# Patient Record
Sex: FEMALE | Race: BLACK OR AFRICAN AMERICAN | NOT HISPANIC OR LATINO
[De-identification: names, ages, dates, MRNs, and addresses within clinical notes are randomized per-mention and may not be internally consistent; named-entity substitution may affect disease eponyms.]

---

## 2018-04-27 ENCOUNTER — APPOINTMENT (OUTPATIENT)
Dept: OBGYN | Facility: CLINIC | Age: 48
End: 2018-04-27

## 2018-06-15 ENCOUNTER — APPOINTMENT (OUTPATIENT)
Dept: OBGYN | Facility: CLINIC | Age: 48
End: 2018-06-15

## 2018-06-28 ENCOUNTER — APPOINTMENT (OUTPATIENT)
Dept: OBGYN | Facility: CLINIC | Age: 48
End: 2018-06-28
Payer: COMMERCIAL

## 2018-06-28 DIAGNOSIS — E11.29 TYPE 2 DIABETES MELLITUS WITH OTHER DIABETIC KIDNEY COMPLICATION: ICD-10-CM

## 2018-06-28 DIAGNOSIS — Z80.9 FAMILY HISTORY OF MALIGNANT NEOPLASM, UNSPECIFIED: ICD-10-CM

## 2018-06-28 DIAGNOSIS — Z87.19 PERSONAL HISTORY OF OTHER DISEASES OF THE DIGESTIVE SYSTEM: ICD-10-CM

## 2018-06-28 DIAGNOSIS — Z78.9 OTHER SPECIFIED HEALTH STATUS: ICD-10-CM

## 2018-06-28 DIAGNOSIS — I10 ESSENTIAL (PRIMARY) HYPERTENSION: ICD-10-CM

## 2018-06-28 DIAGNOSIS — Z82.5 FAMILY HISTORY OF ASTHMA AND OTHER CHRONIC LOWER RESPIRATORY DISEASES: ICD-10-CM

## 2018-06-28 DIAGNOSIS — Z87.09 PERSONAL HISTORY OF OTHER DISEASES OF THE RESPIRATORY SYSTEM: ICD-10-CM

## 2018-06-28 DIAGNOSIS — Z83.3 FAMILY HISTORY OF DIABETES MELLITUS: ICD-10-CM

## 2018-06-28 DIAGNOSIS — Z86.39 PERSONAL HISTORY OF OTHER ENDOCRINE, NUTRITIONAL AND METABOLIC DISEASE: ICD-10-CM

## 2018-06-28 PROCEDURE — 99214 OFFICE O/P EST MOD 30 MIN: CPT

## 2018-07-02 LAB
C TRACH RRNA SPEC QL NAA+PROBE: NOT DETECTED
HPV HIGH+LOW RISK DNA PNL CVX: NOT DETECTED
N GONORRHOEA RRNA SPEC QL NAA+PROBE: NOT DETECTED
SOURCE AMPLIFICATION: NORMAL

## 2018-07-05 LAB — CYTOLOGY CVX/VAG DOC THIN PREP: NORMAL

## 2018-07-12 ENCOUNTER — APPOINTMENT (OUTPATIENT)
Dept: OBGYN | Facility: CLINIC | Age: 48
End: 2018-07-12
Payer: COMMERCIAL

## 2018-07-12 PROCEDURE — 76830 TRANSVAGINAL US NON-OB: CPT

## 2018-07-12 PROCEDURE — 76857 US EXAM PELVIC LIMITED: CPT

## 2018-07-23 ENCOUNTER — APPOINTMENT (OUTPATIENT)
Dept: OBGYN | Facility: CLINIC | Age: 48
End: 2018-07-23
Payer: COMMERCIAL

## 2018-07-23 VITALS
BODY MASS INDEX: 51.91 KG/M2 | SYSTOLIC BLOOD PRESSURE: 194 MMHG | WEIGHT: 293 LBS | HEART RATE: 89 BPM | DIASTOLIC BLOOD PRESSURE: 95 MMHG | HEIGHT: 63 IN

## 2018-07-23 PROCEDURE — 99213 OFFICE O/P EST LOW 20 MIN: CPT

## 2018-08-22 ENCOUNTER — APPOINTMENT (OUTPATIENT)
Dept: OBGYN | Facility: CLINIC | Age: 48
End: 2018-08-22
Payer: COMMERCIAL

## 2018-08-22 VITALS
DIASTOLIC BLOOD PRESSURE: 85 MMHG | BODY MASS INDEX: 52.5 KG/M2 | SYSTOLIC BLOOD PRESSURE: 130 MMHG | WEIGHT: 293 LBS | HEART RATE: 77 BPM

## 2018-08-22 PROCEDURE — 99213 OFFICE O/P EST LOW 20 MIN: CPT

## 2018-10-05 ENCOUNTER — OUTPATIENT (OUTPATIENT)
Dept: OUTPATIENT SERVICES | Facility: HOSPITAL | Age: 48
LOS: 1 days | End: 2018-10-05
Payer: COMMERCIAL

## 2018-10-05 VITALS
SYSTOLIC BLOOD PRESSURE: 136 MMHG | RESPIRATION RATE: 20 BRPM | HEIGHT: 64.5 IN | HEART RATE: 74 BPM | TEMPERATURE: 98 F | DIASTOLIC BLOOD PRESSURE: 87 MMHG | WEIGHT: 293 LBS

## 2018-10-05 DIAGNOSIS — Z98.890 OTHER SPECIFIED POSTPROCEDURAL STATES: Chronic | ICD-10-CM

## 2018-10-05 DIAGNOSIS — Z01.818 ENCOUNTER FOR OTHER PREPROCEDURAL EXAMINATION: ICD-10-CM

## 2018-10-05 DIAGNOSIS — D25.9 LEIOMYOMA OF UTERUS, UNSPECIFIED: ICD-10-CM

## 2018-10-05 DIAGNOSIS — I10 ESSENTIAL (PRIMARY) HYPERTENSION: ICD-10-CM

## 2018-10-05 DIAGNOSIS — E11.9 TYPE 2 DIABETES MELLITUS WITHOUT COMPLICATIONS: ICD-10-CM

## 2018-10-05 DIAGNOSIS — Z29.9 ENCOUNTER FOR PROPHYLACTIC MEASURES, UNSPECIFIED: ICD-10-CM

## 2018-10-05 LAB
ANION GAP SERPL CALC-SCNC: 13 MMOL/L — SIGNIFICANT CHANGE UP (ref 5–17)
BUN SERPL-MCNC: 10 MG/DL — SIGNIFICANT CHANGE UP (ref 8–20)
CALCIUM SERPL-MCNC: 9.1 MG/DL — SIGNIFICANT CHANGE UP (ref 8.6–10.2)
CHLORIDE SERPL-SCNC: 100 MMOL/L — SIGNIFICANT CHANGE UP (ref 98–107)
CO2 SERPL-SCNC: 27 MMOL/L — SIGNIFICANT CHANGE UP (ref 22–29)
CREAT SERPL-MCNC: 0.7 MG/DL — SIGNIFICANT CHANGE UP (ref 0.5–1.3)
GLUCOSE SERPL-MCNC: 103 MG/DL — SIGNIFICANT CHANGE UP (ref 70–115)
HBA1C BLD-MCNC: 5.9 % — HIGH (ref 4–5.6)
HCT VFR BLD CALC: 39.5 % — SIGNIFICANT CHANGE UP (ref 37–47)
HGB BLD-MCNC: 12.2 G/DL — SIGNIFICANT CHANGE UP (ref 12–16)
MCHC RBC-ENTMCNC: 27.2 PG — SIGNIFICANT CHANGE UP (ref 27–31)
MCHC RBC-ENTMCNC: 30.9 G/DL — LOW (ref 32–36)
MCV RBC AUTO: 88.2 FL — SIGNIFICANT CHANGE UP (ref 81–99)
PLATELET # BLD AUTO: 387 K/UL — SIGNIFICANT CHANGE UP (ref 150–400)
POTASSIUM SERPL-MCNC: 3.5 MMOL/L — SIGNIFICANT CHANGE UP (ref 3.5–5.3)
POTASSIUM SERPL-SCNC: 3.5 MMOL/L — SIGNIFICANT CHANGE UP (ref 3.5–5.3)
RBC # BLD: 4.48 M/UL — SIGNIFICANT CHANGE UP (ref 4.4–5.2)
RBC # FLD: 13.9 % — SIGNIFICANT CHANGE UP (ref 11–15.6)
SODIUM SERPL-SCNC: 140 MMOL/L — SIGNIFICANT CHANGE UP (ref 135–145)
WBC # BLD: 4.5 K/UL — LOW (ref 4.8–10.8)
WBC # FLD AUTO: 4.5 K/UL — LOW (ref 4.8–10.8)

## 2018-10-05 PROCEDURE — G0463: CPT

## 2018-10-05 PROCEDURE — 93005 ELECTROCARDIOGRAM TRACING: CPT

## 2018-10-05 PROCEDURE — 83036 HEMOGLOBIN GLYCOSYLATED A1C: CPT

## 2018-10-05 PROCEDURE — 80048 BASIC METABOLIC PNL TOTAL CA: CPT

## 2018-10-05 PROCEDURE — 36415 COLL VENOUS BLD VENIPUNCTURE: CPT

## 2018-10-05 PROCEDURE — 93010 ELECTROCARDIOGRAM REPORT: CPT

## 2018-10-05 PROCEDURE — 85027 COMPLETE CBC AUTOMATED: CPT

## 2018-10-05 NOTE — H&P PST ADULT - ASSESSMENT
CAPRINI SCORE [CLOT]    AGE RELATED RISK FACTORS                                                       MOBILITY RELATED FACTORS  [x ] Age 41-60 years                                            (1 Point)                  [ ] Bed rest                                                        (1 Point)  [] Age: 61-74 years                                           (2 Points)                 [ ] Plaster cast                                                   (2 Points)  [ ] Age= 75 years                                              (3 Points)                 [ ] Bed bound for more than 72 hours                 (2 Points)    DISEASE RELATED RISK FACTORS                                               GENDER SPECIFIC FACTORS  [ x] Edema in the lower extremities                       (1 Point)                  [ ] Pregnancy                                                     (1 Point)  [ ] Varicose veins                                               (1 Point)                  [ ] Post-partum < 6 weeks                                   (1 Point)             [ x] BMI > 25 Kg/m2                                            (1 Point)                  [ ] Hormonal therapy  or oral contraception          (1 Point)                 [ ] Sepsis (in the previous month)                        (1 Point)                  [ ] History of pregnancy complications                 (1 point)  [ ] Pneumonia or serious lung disease                                               [ ] Unexplained or recurrent                     (1 Point)           (in the previous month)                               (1 Point)  [ ] Abnormal pulmonary function test                     (1 Point)                 SURGERY RELATED RISK FACTORS  [ ] Acute myocardial infarction                              (1 Point)                 [ ]  Section                                             (1 Point)  [ ] Congestive heart failure (in the previous month)  (1 Point)               [ ] Minor surgery                                                  (1 Point)   [ ] Inflammatory bowel disease                             (1 Point)                 [ ] Arthroscopic surgery                                        (2 Points)  [ ] Central venous access                                      (2 Points)                [ x] General surgery lasting more than 45 minutes   (2 Points)       [ ] Stroke (in the previous month)                          (5 Points)               [ ] Elective arthroplasty                                         (5 Points)                                                                                                                                               HEMATOLOGY RELATED FACTORS                                                 TRAUMA RELATED RISK FACTORS  [ ] Prior episodes of VTE                                     (3 Points)                 [ ] Fracture of the hip, pelvis, or leg                       (5 Points)  [ ] Positive family history for VTE                         (3 Points)                 [ ] Acute spinal cord injury (in the previous month)  (5 Points)  [ ] Prothrombin 64967 A                                     (3 Points)                 [ ] Paralysis  (less than 1 month)                             (5 Points)  [ ] Factor V Leiden                                             (3 Points)                  [ ] Multiple Trauma within 1 month                        (5 Points)  [ ] Lupus anticoagulants                                     (3 Points)                                                           [ ] Anticardiolipin antibodies                               (3 Points)                                                       [ ] High homocysteine in the blood                      (3 Points)                                             [ ] Other congenital or acquired thrombophilia      (3 Points)                                                [ ] Heparin induced thrombocytopenia                  (3 Points)                                          Total Score [    5  ]

## 2018-10-05 NOTE — H&P PST ADULT - HISTORY OF PRESENT ILLNESS
This is a 48  y.o female who presents to PST today.  The pt reports she has a history of fibroids and has had abnormal menstrual cycles. She also has an enlarged uterus and is now scheduled to have a D & C in the near future.

## 2018-10-05 NOTE — ASU PATIENT PROFILE, ADULT - LEARNING ASSESSMENT (PATIENT) ADDITIONAL COMMENTS
Instructed pt on pre-op instructions, tips for safer surgery, pain management scale, pre-surgical infection prevention instructions, take Valsartan-HCTZ with a sip of water and Symbicort the morning of surgery, medical clearance - Dr Rodriges, verbalized understanding of all instructions given.

## 2018-10-23 PROBLEM — G47.30 SLEEP APNEA, UNSPECIFIED: Chronic | Status: ACTIVE | Noted: 2018-10-05

## 2018-10-24 ENCOUNTER — APPOINTMENT (OUTPATIENT)
Dept: OBGYN | Facility: CLINIC | Age: 48
End: 2018-10-24

## 2018-11-05 ENCOUNTER — OUTPATIENT (OUTPATIENT)
Dept: OUTPATIENT SERVICES | Facility: HOSPITAL | Age: 48
LOS: 1 days | End: 2018-11-05
Payer: COMMERCIAL

## 2018-11-05 VITALS
TEMPERATURE: 98 F | HEART RATE: 68 BPM | WEIGHT: 293 LBS | HEIGHT: 63.5 IN | SYSTOLIC BLOOD PRESSURE: 146 MMHG | RESPIRATION RATE: 16 BRPM | DIASTOLIC BLOOD PRESSURE: 88 MMHG

## 2018-11-05 DIAGNOSIS — Z29.9 ENCOUNTER FOR PROPHYLACTIC MEASURES, UNSPECIFIED: ICD-10-CM

## 2018-11-05 DIAGNOSIS — D25.9 LEIOMYOMA OF UTERUS, UNSPECIFIED: ICD-10-CM

## 2018-11-05 DIAGNOSIS — Z98.890 OTHER SPECIFIED POSTPROCEDURAL STATES: Chronic | ICD-10-CM

## 2018-11-05 DIAGNOSIS — I10 ESSENTIAL (PRIMARY) HYPERTENSION: ICD-10-CM

## 2018-11-05 DIAGNOSIS — Z01.818 ENCOUNTER FOR OTHER PREPROCEDURAL EXAMINATION: ICD-10-CM

## 2018-11-05 DIAGNOSIS — E11.9 TYPE 2 DIABETES MELLITUS WITHOUT COMPLICATIONS: ICD-10-CM

## 2018-11-05 LAB
ANION GAP SERPL CALC-SCNC: 13 MMOL/L — SIGNIFICANT CHANGE UP (ref 5–17)
BUN SERPL-MCNC: 12 MG/DL — SIGNIFICANT CHANGE UP (ref 8–20)
CALCIUM SERPL-MCNC: 9.3 MG/DL — SIGNIFICANT CHANGE UP (ref 8.6–10.2)
CHLORIDE SERPL-SCNC: 101 MMOL/L — SIGNIFICANT CHANGE UP (ref 98–107)
CO2 SERPL-SCNC: 27 MMOL/L — SIGNIFICANT CHANGE UP (ref 22–29)
CREAT SERPL-MCNC: 0.73 MG/DL — SIGNIFICANT CHANGE UP (ref 0.5–1.3)
GLUCOSE SERPL-MCNC: 90 MG/DL — SIGNIFICANT CHANGE UP (ref 70–115)
HBA1C BLD-MCNC: 6.2 % — HIGH (ref 4–5.6)
HCT VFR BLD CALC: 32.7 % — LOW (ref 37–47)
HGB BLD-MCNC: 10.4 G/DL — LOW (ref 12–16)
MCHC RBC-ENTMCNC: 26.7 PG — LOW (ref 27–31)
MCHC RBC-ENTMCNC: 31.8 G/DL — LOW (ref 32–36)
MCV RBC AUTO: 84.1 FL — SIGNIFICANT CHANGE UP (ref 81–99)
PLATELET # BLD AUTO: 351 K/UL — SIGNIFICANT CHANGE UP (ref 150–400)
POTASSIUM SERPL-MCNC: 3.4 MMOL/L — LOW (ref 3.5–5.3)
POTASSIUM SERPL-SCNC: 3.4 MMOL/L — LOW (ref 3.5–5.3)
RBC # BLD: 3.89 M/UL — LOW (ref 4.4–5.2)
RBC # FLD: 14.3 % — SIGNIFICANT CHANGE UP (ref 11–15.6)
SODIUM SERPL-SCNC: 141 MMOL/L — SIGNIFICANT CHANGE UP (ref 135–145)
WBC # BLD: 5.8 K/UL — SIGNIFICANT CHANGE UP (ref 4.8–10.8)
WBC # FLD AUTO: 5.8 K/UL — SIGNIFICANT CHANGE UP (ref 4.8–10.8)

## 2018-11-05 PROCEDURE — G0463: CPT

## 2018-11-05 PROCEDURE — 85027 COMPLETE CBC AUTOMATED: CPT

## 2018-11-05 PROCEDURE — 36415 COLL VENOUS BLD VENIPUNCTURE: CPT

## 2018-11-05 PROCEDURE — 83036 HEMOGLOBIN GLYCOSYLATED A1C: CPT

## 2018-11-05 PROCEDURE — 80048 BASIC METABOLIC PNL TOTAL CA: CPT

## 2018-11-05 NOTE — H&P PST ADULT - ASSESSMENT
49 yo female with menometrorrhagia, fibroid uterus.    CAPRINI SCORE [CLOT]    AGE RELATED RISK FACTORS                                                       MOBILITY RELATED FACTORS  [x ] Age 41-60 years                                            (1 Point)                  [ ] Bed rest                                                        (1 Point)  [ ] Age: 61-74 years                                           (2 Points)                 [ ] Plaster cast                                                   (2 Points)  [ ] Age= 75 years                                              (3 Points)                 [ ] Bed bound for more than 72 hours                 (2 Points)    DISEASE RELATED RISK FACTORS                                               GENDER SPECIFIC FACTORS  [ ] Edema in the lower extremities                       (1 Point)                  [ ] Pregnancy                                                     (1 Point)  [ ] Varicose veins                                               (1 Point)                  [ ] Post-partum < 6 weeks                                   (1 Point)             [ x] BMI > 25 Kg/m2                                            (1 Point)                  [ ] Hormonal therapy  or oral contraception          (1 Point)                 [ ] Sepsis (in the previous month)                        (1 Point)                  [ ] History of pregnancy complications                 (1 point)  [ ] Pneumonia or serious lung disease                                               [ ] Unexplained or recurrent                     (1 Point)           (in the previous month)                               (1 Point)  [ ] Abnormal pulmonary function test                     (1 Point)                 SURGERY RELATED RISK FACTORS  [ ] Acute myocardial infarction                              (1 Point)                 [ ]  Section                                             (1 Point)  [ ] Congestive heart failure (in the previous month)  (1 Point)               [x ] Minor surgery                                                  (1 Point)   [ ] Inflammatory bowel disease                             (1 Point)                 [ ] Arthroscopic surgery                                        (2 Points)  [ ] Central venous access                                      (2 Points)                [ ] General surgery lasting more than 45 minutes   (2 Points)       [ ] Stroke (in the previous month)                          (5 Points)               [ ] Elective arthroplasty                                         (5 Points)                                                                                                                                               HEMATOLOGY RELATED FACTORS                                                 TRAUMA RELATED RISK FACTORS  [ ] Prior episodes of VTE                                     (3 Points)                 [ ] Fracture of the hip, pelvis, or leg                       (5 Points)  [ ] Positive family history for VTE                         (3 Points)                 [ ] Acute spinal cord injury (in the previous month)  (5 Points)  [ ] Prothrombin 74314 A                                     (3 Points)                 [ ] Paralysis  (less than 1 month)                             (5 Points)  [ ] Factor V Leiden                                             (3 Points)                  [ ] Multiple Trauma within 1 month                        (5 Points)  [ ] Lupus anticoagulants                                     (3 Points)                                                           [ ] Anticardiolipin antibodies                               (3 Points)                                                       [ ] High homocysteine in the blood                      (3 Points)                                             [ ] Other congenital or acquired thrombophilia      (3 Points)                                                [ ] Heparin induced thrombocytopenia                  (3 Points)                                          Total Score [      3    ]

## 2018-11-12 ENCOUNTER — TRANSCRIPTION ENCOUNTER (OUTPATIENT)
Age: 48
End: 2018-11-12

## 2018-11-13 ENCOUNTER — RESULT REVIEW (OUTPATIENT)
Age: 48
End: 2018-11-13

## 2018-11-13 ENCOUNTER — OUTPATIENT (OUTPATIENT)
Dept: OUTPATIENT SERVICES | Facility: HOSPITAL | Age: 48
LOS: 1 days | End: 2018-11-13
Payer: COMMERCIAL

## 2018-11-13 ENCOUNTER — APPOINTMENT (OUTPATIENT)
Dept: OBGYN | Facility: HOSPITAL | Age: 48
End: 2018-11-13

## 2018-11-13 VITALS
TEMPERATURE: 98 F | SYSTOLIC BLOOD PRESSURE: 120 MMHG | RESPIRATION RATE: 17 BRPM | DIASTOLIC BLOOD PRESSURE: 72 MMHG | HEART RATE: 97 BPM | OXYGEN SATURATION: 99 %

## 2018-11-13 VITALS
HEART RATE: 97 BPM | HEIGHT: 63 IN | RESPIRATION RATE: 16 BRPM | WEIGHT: 293 LBS | DIASTOLIC BLOOD PRESSURE: 70 MMHG | TEMPERATURE: 98 F | SYSTOLIC BLOOD PRESSURE: 138 MMHG | OXYGEN SATURATION: 100 %

## 2018-11-13 DIAGNOSIS — R10.2 PELVIC AND PERINEAL PAIN: ICD-10-CM

## 2018-11-13 DIAGNOSIS — Z98.890 OTHER SPECIFIED POSTPROCEDURAL STATES: Chronic | ICD-10-CM

## 2018-11-13 DIAGNOSIS — D25.9 LEIOMYOMA OF UTERUS, UNSPECIFIED: ICD-10-CM

## 2018-11-13 DIAGNOSIS — N92.1 EXCESSIVE AND FREQUENT MENSTRUATION WITH IRREGULAR CYCLE: ICD-10-CM

## 2018-11-13 LAB — GLUCOSE BLDC GLUCOMTR-MCNC: 111 MG/DL — HIGH (ref 70–99)

## 2018-11-13 PROCEDURE — 58558 HYSTEROSCOPY BIOPSY: CPT

## 2018-11-13 PROCEDURE — 88305 TISSUE EXAM BY PATHOLOGIST: CPT

## 2018-11-13 PROCEDURE — 88305 TISSUE EXAM BY PATHOLOGIST: CPT | Mod: 26

## 2018-11-13 PROCEDURE — 82962 GLUCOSE BLOOD TEST: CPT

## 2018-11-13 RX ORDER — SODIUM CHLORIDE 9 MG/ML
3 INJECTION INTRAMUSCULAR; INTRAVENOUS; SUBCUTANEOUS ONCE
Qty: 0 | Refills: 0 | Status: DISCONTINUED | OUTPATIENT
Start: 2018-11-13 | End: 2018-11-13

## 2018-11-13 RX ORDER — IBUPROFEN 200 MG
1 TABLET ORAL
Qty: 28 | Refills: 0
Start: 2018-11-13 | End: 2018-11-19

## 2018-11-13 RX ORDER — ONDANSETRON 8 MG/1
4 TABLET, FILM COATED ORAL ONCE
Qty: 0 | Refills: 0 | Status: DISCONTINUED | OUTPATIENT
Start: 2018-11-13 | End: 2018-11-13

## 2018-11-13 RX ORDER — SODIUM CHLORIDE 9 MG/ML
1000 INJECTION, SOLUTION INTRAVENOUS
Qty: 0 | Refills: 0 | Status: DISCONTINUED | OUTPATIENT
Start: 2018-11-13 | End: 2018-11-13

## 2018-11-13 RX ORDER — METFORMIN HYDROCHLORIDE 850 MG/1
1 TABLET ORAL
Qty: 0 | Refills: 0 | COMMUNITY

## 2018-11-13 RX ORDER — CHOLECALCIFEROL (VITAMIN D3) 125 MCG
1 CAPSULE ORAL
Qty: 0 | Refills: 0 | COMMUNITY

## 2018-11-13 RX ORDER — NEBIVOLOL HYDROCHLORIDE 5 MG/1
1 TABLET ORAL
Qty: 0 | Refills: 0 | COMMUNITY

## 2018-11-13 RX ORDER — MESALAMINE 400 MG
0 TABLET, DELAYED RELEASE (ENTERIC COATED) ORAL
Qty: 0 | Refills: 0 | COMMUNITY

## 2018-11-13 RX ORDER — POTASSIUM CHLORIDE 20 MEQ
0 PACKET (EA) ORAL
Qty: 0 | Refills: 0 | COMMUNITY

## 2018-11-13 RX ORDER — FENTANYL CITRATE 50 UG/ML
25 INJECTION INTRAVENOUS
Qty: 0 | Refills: 0 | Status: DISCONTINUED | OUTPATIENT
Start: 2018-11-13 | End: 2018-11-13

## 2018-11-13 RX ORDER — TIOTROPIUM BROMIDE 18 UG/1
1 CAPSULE ORAL; RESPIRATORY (INHALATION)
Qty: 0 | Refills: 0 | COMMUNITY

## 2018-11-13 RX ORDER — NAPROXEN AND ESOMEPRAZOLE MAGNESIUM 375; 20 MG/1; MG/1
1 TABLET, DELAYED RELEASE ORAL
Qty: 0 | Refills: 0 | COMMUNITY

## 2018-11-13 NOTE — ASU DISCHARGE PLAN (ADULT/PEDIATRIC). - NOTIFY
Persistent Nausea and Vomiting/Inability to Tolerate Liquids or Foods/Bleeding that does not stop/GYN Fever>100.4/Unable to Urinate/Pain not relieved by Medications

## 2018-11-13 NOTE — ASU DISCHARGE PLAN (ADULT/PEDIATRIC). - MEDICATION SUMMARY - MEDICATIONS TO TAKE
I will START or STAY ON the medications listed below when I get home from the hospital:    ibuprofen 600 mg oral tablet  -- 1 tab(s) by mouth every 6 hours, As Needed -for mild pain   -- Do not take this drug if you are pregnant.  It is very important that you take or use this exactly as directed.  Do not skip doses or discontinue unless directed by your doctor.  May cause drowsiness or dizziness.  Obtain medical advice before taking any non-prescription drugs as some may affect the action of this medication.  Take with food or milk.    -- Indication: For Pain

## 2018-11-13 NOTE — BRIEF OPERATIVE NOTE - POST-OP DX
Intramural, submucous, and subserous leiomyoma of uterus  11/13/2018    Active  Yosi Tillman  Menorrhagia with irregular cycle  11/13/2018    Active  Yosi Tillman  Pelvic pain in female  11/13/2018    Active  Yosi Tillman

## 2018-11-13 NOTE — BRIEF OPERATIVE NOTE - PROCEDURE
<<-----Click on this checkbox to enter Procedure Diagnostic hysteroscopy with dilation and curettage of uterus  11/13/2018    Active  AMBROSIO

## 2018-11-13 NOTE — ASU DISCHARGE PLAN (ADULT/PEDIATRIC). - ACTIVITY LEVEL
weight bearing as tolerated/no douching/no intercourse/nothing per rectum/no tampons/no tub baths/nothing per vagina/no heavy lifting

## 2018-11-16 LAB — SURGICAL PATHOLOGY FINAL REPORT - CH: SIGNIFICANT CHANGE UP

## 2018-11-23 ENCOUNTER — APPOINTMENT (OUTPATIENT)
Dept: OBGYN | Facility: CLINIC | Age: 48
End: 2018-11-23
Payer: COMMERCIAL

## 2018-11-23 VITALS
WEIGHT: 293 LBS | BODY MASS INDEX: 51.91 KG/M2 | SYSTOLIC BLOOD PRESSURE: 165 MMHG | DIASTOLIC BLOOD PRESSURE: 85 MMHG | HEART RATE: 83 BPM | HEIGHT: 63 IN

## 2018-11-23 DIAGNOSIS — Z12.31 ENCOUNTER FOR SCREENING MAMMOGRAM FOR MALIGNANT NEOPLASM OF BREAST: ICD-10-CM

## 2018-11-23 PROCEDURE — 99213 OFFICE O/P EST LOW 20 MIN: CPT

## 2018-12-18 NOTE — ASU PATIENT PROFILE, ADULT - TEACHING/LEARNING LEARNING PREFERENCES
New to discuss, uncontrolled.   Lab Results   Component Value Date/Time    CHOLSTRLTOT 222 (H) 08/10/2018 10:03 AM    CHOLSTRLTOT 169 09/01/2017 07:22 AM     (H) 08/10/2018 10:03 AM     (H) 09/01/2017 07:22 AM    HDL 45 08/10/2018 10:03 AM    HDL 40 09/01/2017 07:22 AM    TRIGLYCERIDE 77 08/10/2018 10:03 AM    TRIGLYCERIDE 92 09/01/2017 07:22 AM     She has not been doing well with her lifestyle and general because of her recent psychosocial stressors.  She has been going through divorce and moving recently.  She is not eating healthy choices and she has not been exercising on a regular basis.     verbal instruction/written material/individual instruction

## 2019-04-22 PROBLEM — D64.9 ANEMIA, UNSPECIFIED: Chronic | Status: ACTIVE | Noted: 2018-11-05

## 2019-04-30 ENCOUNTER — APPOINTMENT (OUTPATIENT)
Dept: OBGYN | Facility: CLINIC | Age: 49
End: 2019-04-30
Payer: COMMERCIAL

## 2019-04-30 VITALS
HEIGHT: 63 IN | DIASTOLIC BLOOD PRESSURE: 83 MMHG | WEIGHT: 293 LBS | HEART RATE: 84 BPM | SYSTOLIC BLOOD PRESSURE: 133 MMHG | BODY MASS INDEX: 51.91 KG/M2

## 2019-04-30 PROCEDURE — 99214 OFFICE O/P EST MOD 30 MIN: CPT

## 2019-04-30 NOTE — HISTORY OF PRESENT ILLNESS
[Lower-Rt-Q] : lower right quadrant [Suprapubic] : suprapubic area [Lower-Lt-Q] : left lower quadrant [Continuous] : continuous [Dull] : dull [5/10] : is 5/10 in severity [Fever] : no fever [Nausea] : no nausea [Vomiting] : no vomiting [Diarrhea] : no diarrhea [Vaginal Discharge] : no vaginal discharge [Vaginal Bleeding] : no vaginal bleeding [Pelvic Pressure] : pelvic pressure [Dysuria] : no dysuria [Pain with BMs] : no pain with bowel movements [Dysmenorrhea] : dysmenorrhea [Heat] : improved by heat [Non-opiod Analgesic] : improved by non-opiod analgesic [Rest] : improved by rest [Emotional Stress] : worsened by emotional stress [Lifting] : worsened by lifting [Menses] : worsened by menses [Movement] : worsened by movement [Early Pregnancy] : not pregnant [Excessive Physical Activity] : no excessive physical activity [New Sexual Partner] : no new sexual partners [Pelvic Trauma] : no pelvic trauma [STDs] : no sexually transmitted disease [Current IUD] : not currently using an IUD [Previous IUD] : no history of IUD use [Appendicitis] : no history of appendicitis [Cholelithiasis] : no history of cholelithiasis [Crohn's Disease] : no history of Crohn's disease [Diverticular Disease] : no history of Diverticular disease [Intrauterine Pregnancy] : no history of intrauterine pregnancy [Nephrolithiasis] : no history of nephrolithiasis [Ovarian Mass] : no history of ovarian mass [Ovarian Torsion] : no history of ovarian torsion

## 2021-01-17 ENCOUNTER — EMERGENCY (EMERGENCY)
Facility: HOSPITAL | Age: 51
LOS: 1 days | Discharge: HOME | End: 2021-01-17
Attending: STUDENT IN AN ORGANIZED HEALTH CARE EDUCATION/TRAINING PROGRAM
Payer: COMMERCIAL

## 2021-01-17 VITALS
HEIGHT: 63 IN | HEART RATE: 77 BPM | DIASTOLIC BLOOD PRESSURE: 70 MMHG | RESPIRATION RATE: 18 BRPM | OXYGEN SATURATION: 100 % | TEMPERATURE: 98 F | SYSTOLIC BLOOD PRESSURE: 129 MMHG

## 2021-01-17 DIAGNOSIS — Z98.890 OTHER SPECIFIED POSTPROCEDURAL STATES: Chronic | ICD-10-CM

## 2021-01-17 PROCEDURE — 99285 EMERGENCY DEPT VISIT HI MDM: CPT

## 2021-01-17 NOTE — ED ADULT TRIAGE NOTE - CHIEF COMPLAINT QUOTE
biba c/o difficulty breathing s/p eating an "edible cupcake." pt has no s/s of acute distress, denies any symptoms at present, pmhx asthma.

## 2021-01-18 VITALS
DIASTOLIC BLOOD PRESSURE: 74 MMHG | HEART RATE: 99 BPM | OXYGEN SATURATION: 96 % | SYSTOLIC BLOOD PRESSURE: 111 MMHG | RESPIRATION RATE: 18 BRPM | TEMPERATURE: 99 F

## 2021-01-18 LAB
ALBUMIN SERPL ELPH-MCNC: 3.9 G/DL — SIGNIFICANT CHANGE UP (ref 3.3–5.2)
ALP SERPL-CCNC: 74 U/L — SIGNIFICANT CHANGE UP (ref 40–120)
ALT FLD-CCNC: 11 U/L — SIGNIFICANT CHANGE UP
ANION GAP SERPL CALC-SCNC: 11 MMOL/L — SIGNIFICANT CHANGE UP (ref 5–17)
AST SERPL-CCNC: 21 U/L — SIGNIFICANT CHANGE UP
BASOPHILS # BLD AUTO: 0.03 K/UL — SIGNIFICANT CHANGE UP (ref 0–0.2)
BASOPHILS NFR BLD AUTO: 0.3 % — SIGNIFICANT CHANGE UP (ref 0–2)
BILIRUB SERPL-MCNC: 0.2 MG/DL — LOW (ref 0.4–2)
BUN SERPL-MCNC: 14 MG/DL — SIGNIFICANT CHANGE UP (ref 8–20)
CALCIUM SERPL-MCNC: 9.2 MG/DL — SIGNIFICANT CHANGE UP (ref 8.6–10.2)
CHLORIDE SERPL-SCNC: 102 MMOL/L — SIGNIFICANT CHANGE UP (ref 98–107)
CO2 SERPL-SCNC: 25 MMOL/L — SIGNIFICANT CHANGE UP (ref 22–29)
CREAT SERPL-MCNC: 0.75 MG/DL — SIGNIFICANT CHANGE UP (ref 0.5–1.3)
EOSINOPHIL # BLD AUTO: 0.03 K/UL — SIGNIFICANT CHANGE UP (ref 0–0.5)
EOSINOPHIL NFR BLD AUTO: 0.3 % — SIGNIFICANT CHANGE UP (ref 0–6)
GLUCOSE SERPL-MCNC: 158 MG/DL — HIGH (ref 70–99)
HCT VFR BLD CALC: 37.1 % — SIGNIFICANT CHANGE UP (ref 34.5–45)
HGB BLD-MCNC: 11.5 G/DL — SIGNIFICANT CHANGE UP (ref 11.5–15.5)
IMM GRANULOCYTES NFR BLD AUTO: 0.3 % — SIGNIFICANT CHANGE UP (ref 0–1.5)
LIDOCAIN IGE QN: 37 U/L — SIGNIFICANT CHANGE UP (ref 22–51)
LYMPHOCYTES # BLD AUTO: 0.94 K/UL — LOW (ref 1–3.3)
LYMPHOCYTES # BLD AUTO: 10.6 % — LOW (ref 13–44)
MCHC RBC-ENTMCNC: 25.9 PG — LOW (ref 27–34)
MCHC RBC-ENTMCNC: 31 GM/DL — LOW (ref 32–36)
MCV RBC AUTO: 83.6 FL — SIGNIFICANT CHANGE UP (ref 80–100)
MONOCYTES # BLD AUTO: 0.37 K/UL — SIGNIFICANT CHANGE UP (ref 0–0.9)
MONOCYTES NFR BLD AUTO: 4.2 % — SIGNIFICANT CHANGE UP (ref 2–14)
NEUTROPHILS # BLD AUTO: 7.48 K/UL — HIGH (ref 1.8–7.4)
NEUTROPHILS NFR BLD AUTO: 84.3 % — HIGH (ref 43–77)
PLATELET # BLD AUTO: 354 K/UL — SIGNIFICANT CHANGE UP (ref 150–400)
POTASSIUM SERPL-MCNC: 4.3 MMOL/L — SIGNIFICANT CHANGE UP (ref 3.5–5.3)
POTASSIUM SERPL-SCNC: 4.3 MMOL/L — SIGNIFICANT CHANGE UP (ref 3.5–5.3)
PROT SERPL-MCNC: 7.5 G/DL — SIGNIFICANT CHANGE UP (ref 6.6–8.7)
RBC # BLD: 4.44 M/UL — SIGNIFICANT CHANGE UP (ref 3.8–5.2)
RBC # FLD: 16 % — HIGH (ref 10.3–14.5)
SODIUM SERPL-SCNC: 138 MMOL/L — SIGNIFICANT CHANGE UP (ref 135–145)
WBC # BLD: 8.88 K/UL — SIGNIFICANT CHANGE UP (ref 3.8–10.5)
WBC # FLD AUTO: 8.88 K/UL — SIGNIFICANT CHANGE UP (ref 3.8–10.5)

## 2021-01-18 PROCEDURE — 36415 COLL VENOUS BLD VENIPUNCTURE: CPT

## 2021-01-18 PROCEDURE — 96374 THER/PROPH/DIAG INJ IV PUSH: CPT

## 2021-01-18 PROCEDURE — 82962 GLUCOSE BLOOD TEST: CPT

## 2021-01-18 PROCEDURE — 99284 EMERGENCY DEPT VISIT MOD MDM: CPT | Mod: 25

## 2021-01-18 PROCEDURE — 80053 COMPREHEN METABOLIC PANEL: CPT

## 2021-01-18 PROCEDURE — 85025 COMPLETE CBC W/AUTO DIFF WBC: CPT

## 2021-01-18 PROCEDURE — 83690 ASSAY OF LIPASE: CPT

## 2021-01-18 RX ORDER — SODIUM CHLORIDE 9 MG/ML
1000 INJECTION INTRAMUSCULAR; INTRAVENOUS; SUBCUTANEOUS ONCE
Refills: 0 | Status: COMPLETED | OUTPATIENT
Start: 2021-01-18 | End: 2021-01-18

## 2021-01-18 RX ORDER — ONDANSETRON 8 MG/1
4 TABLET, FILM COATED ORAL ONCE
Refills: 0 | Status: COMPLETED | OUTPATIENT
Start: 2021-01-18 | End: 2021-01-18

## 2021-01-18 RX ADMIN — ONDANSETRON 4 MILLIGRAM(S): 8 TABLET, FILM COATED ORAL at 01:51

## 2021-01-18 RX ADMIN — SODIUM CHLORIDE 1000 MILLILITER(S): 9 INJECTION INTRAMUSCULAR; INTRAVENOUS; SUBCUTANEOUS at 01:51

## 2021-01-18 NOTE — ED PROVIDER NOTE - NSFOLLOWUPINSTRUCTIONS_ED_ALL_ED_FT
Please follow-up with your primary care physician in a week. If your symptoms continue or worsen please come back to the emergency room.    What You Need to Know About Marijuana Use      Marijuana is a mixture of the dried leaves and flowers of the hemp plant Cannabis sativa. The plant's active ingredients (cannabinoids) change the chemistry of the brain. If you smoke or eat marijuana, you will experience changes in the way you think, feel, and behave.  Many people use marijuana because it helps them relax and puts them in a pleasurable mood (marijuana high). Some people use marijuana for medical effects, such as:  •Reduced nausea.      •Increased appetite.      •Reduced muscle spasm.      •Pain relief.      Researchers are studying other possible medical uses for marijuana.      How can marijuana use affect me?    Many people find a marijuana high to be pleasurable and relaxing. Other people find a marijuana high to be uncomfortable or anxiety-causing. This drug can cause short-term and long-term physical and mental effects. Taking high doses of marijuana or trying to quit marijuana can also affect you.  Short-term effects of marijuana use include:  •Temporary relief of symptoms from a medical condition.      •Changes in mood and perception (feeling high).      •Increased hunger.      •Increased heart rate.      •Slowed movement and reaction time.      •Poor memory, judgment, and problem solving ability.      •Altered sense of time.      •Changes to vision.      •Bloodshot eyes.      •Coughing.    Long-term effects of marijuana use include:  •Higher risk of lung and breathing problems.      •Higher risk of heart attack.      •Higher risk of testicular cancer.      •Mental and physical dependence (addiction).      •Slowed brain development in young people. Babies whose mothers used marijuana during pregnancy have an increased risk of problems with brain development and behavior.      •Temporary periods of false perceptions or beliefs (hallucinations or paranoia).      •Worsening of mental illness.      •Onset of new mental illness such as anxiety, depression, or suicidal thoughts.      •Withdrawal symptoms when stopping marijuana, such as sleeplessness, anxiety, cravings, and anger.      •Difficulty maintaining healthy relationships.      •Poor memory, and difficulty concentrating and learning. This can result in decreased intelligence and poor performance at school or work, and an increased risk of dropping out of school.      •Higher risk of using other substances like alcohol and nicotine.    High doses of marijuana can cause:  •Panic.      •Anxiety.      •Mental confusion.      •Hallucinations.    Quitting marijuana after using it for a long time can cause withdrawal symptoms, such as:  •Headache.      •Shakiness.      •Sweating.      •Stomach pain.      •Nausea.      •Restlessness.      •Irritability.      •Trouble sleeping.      •Decreased appetite.        What are the benefits of not using marijuana?    Not using marijuana can keep you from becoming dependent on it. You can avoid the negative effects of the drug that can reduce your quality of life. You can avoid accidents caused by the slowed reaction time that is common with marijuana use.      If I already use marijuana, what steps can I take to stop using it?    If you are not physically or mentally dependent on marijuana, you should be able to stop using it on your own. If you cannot stop on your own, ask your health care provider for help. Treatment for marijuana addiction is similar to treatment for other addictions. It may include:  •Cognitive-behavioral therapy (psychotherapy). This may include individual or group therapy.      •Joining a support group.      •Treating medical, behavioral, or mental health conditions that exist along with marijuana dependency.        Where can I get more information?  Learn more about:  •Marijuana from the [place]: https://www.drugabuse.gov/publications/drugfacts/marijuana      •Medical marijuana from the National Institutes of Health: https://nccih.nih.gov/health/marijuana      •Treatment options from the Substance Abuse and Mental Health Services Administration: https://findtreatment.samhsa.gov/      •Recovery from marijuana dependency from Recovery.org: http://www.recovery.org/topics/marijuana-recovery        When should I seek medical care?  Talk with your health care provider if:  •You want to stop using marijuana but you cannot.      •You have withdrawal symptoms when you try to stop using marijuana.      •You are using marijuana every day.      •You are using marijuana along with other drugs like cocaine or alcohol.      •You have anxiety or depression.      •You have hallucinations or paranoia.      •Marijuana use is interfering with your relationships or your ability to function normally at school or at work.        Summary    •You may become physically or mentally dependent on marijuana.      •Long-term use may interfere with your ability to function normally at home, school, or work.      •Marijuana addiction is treatable.      This information is not intended to replace advice given to you by your health care provider. Make sure you discuss any questions you have with your health care provider.

## 2021-01-18 NOTE — ED PROVIDER NOTE - CLINICAL SUMMARY MEDICAL DECISION MAKING FREE TEXT BOX
49 yo female presenting for SOB and vomiting. Patient ate a marijuana edible. Vitals stable. patient is tire but responsive. Will give her time to sober, will control nausea with zofran. In the mean time will check labs given medical hx. 49 yo female presenting for SOB and vomiting. Patient ate a marijuana edible. Vitals stable. Patient is tired but responsive. Will give her time to sober, will control nausea with Zofran. In the mean time will check labs given medical hx.

## 2021-01-18 NOTE — ED PROVIDER NOTE - OBJECTIVE STATEMENT
51 yo female with hx of htn and dm presents to the ED complaining of SOB. Patient states that she took a marijuana edible in the form of a cupcake. Patient ate the whole cupcake but is unsure what time. As per pt's sister she began saying she wasn't getting enough air. patietn also began having nausea and vomiting, Including an ep 49 yo female with hx of htn and dm presents to the ED complaining of SOB. Patient states that she took a marijuana edible in the form of a cupcake. Patient ate the whole cupcake but is unsure what time. As per pt's sister she began saying she wasn't getting enough air. Patient also began having nausea and vomiting, Including an episode in the ED, nonbloody nonbilious. Patient also had one pina colada that she made at home. Patient received edibles from a friend. Patient denies taking any other drugs.

## 2021-01-18 NOTE — ED ADULT NURSE NOTE - OBJECTIVE STATEMENT
Pt BIBA with reports of SOB after consuming an entire edible cupcake. Pt denies using recreational drugs regularly. Pt received lethargic, arouseable, becoming oriented x4. Pt reporting lightheadedness/dizziness. Pt with respirations appearing even, unlabored at this time, breath sounds clear bilaterally. Pt denies pain. Skin warm, dry, appropriate for race, peripheral pulses palpable with capillary refill <3 seconds. Abdomen soft, nontender. Pt with one episode of vomiting in the ED, denies N/V at this time. Pt medicated per orders. Safety maintained, will continue to monitor.

## 2021-01-18 NOTE — ED ADULT NURSE NOTE - CAS ELECT INFOMATION PROVIDED
DC instructions provided and reviewed by Dr. Damon Stephens. Pt with VS doucmented per flow prior to DC. Pt escorted to exit via wheelchair, no apparent acute distress observed at time of discharge, safety maintained in ED./DC instructions

## 2021-01-18 NOTE — ED PROVIDER NOTE - NS ED ROS FT
Constitutional: +tired  Eyes: No visual changes  Cardiac: No chest pain with exertion.  Respiratory: +SOB. No cough or respiratory distress.   GI: + nausea, vomiting, No abdominal pain.  MS: No myalgia, muscle weakness  Neuro: No headache or focal weakness  Except as documented in the HPI, all other systems are negative.

## 2021-01-18 NOTE — ED PROVIDER NOTE - ATTENDING CONTRIBUTION TO CARE
50 YOF pmh htn and dm here with vomiting after ingesting marijuana edible and having 1 drink. First time taking the edible. Sister corroborating story. Denies other drugs. Vomited once at home and then again here in ED.   AP - abd soft nontender. symptoms likely 2/2 marijuana ingestion. will monitor for clinical sobriety, supportive care. reassess 50 YOF pmh htn and dm here with vomiting after ingesting marijuana edible and having 1 drink. First time taking the edible. Sister corroborating story. Denies other drugs. Vomited once at home and then again here in ED.   AP - abd soft nontender. symptoms likely 2/2 marijuana ingestion. will monitor for clinical sobriety, supportive care. reassess.

## 2021-01-18 NOTE — ED PROVIDER NOTE - PATIENT PORTAL LINK FT
You can access the FollowMyHealth Patient Portal offered by [facility] by registering at the following website: [email or web address]. By joining [facility]’s FollowMyHealth portal, you will also be able to view your health information using other applications (apps) compatible with our system.

## 2021-01-18 NOTE — ED PROVIDER NOTE - PHYSICAL EXAMINATION
CONSTITUTIONAL: In no acute distress. Patient sleeping, but easily arousable and responding well  SKIN: skin exam is warm and dry, no acute rash.  HEAD: Normocephalic; atraumatic.  EYES: Pupils 3mm, reactive. EOM intact; conjunctiva and sclera clear.  ENT: No nasal discharge; airway clear.  CARD: S1, S2 normal; no murmurs, gallops, or rubs. Regular rate and rhythm. + Symmetric Strong Pulses  RESP: No wheezes, rales or rhonchi. Good air movement bilaterally  ABD: soft; non-distended; non-tender.   EXT: Normal ROM. No edema.   NEURO: Orientedx3, grossly unremarkable.

## 2021-01-18 NOTE — ED ADULT NURSE NOTE - NSIMPLEMENTINTERV_GEN_ALL_ED
Implemented All Fall Risk Interventions:  [place] to call system. Call bell, personal items and telephone within reach. Instruct patient to call for assistance. Room bathroom lighting operational. Non-slip footwear when patient is off stretcher. Physically safe environment: no spills, clutter or unnecessary equipment. Stretcher in lowest position, wheels locked, appropriate side rails in place. Provide visual cue, wrist band, yellow gown, etc. Monitor gait and stability. Monitor for mental status changes and reorient to person, place, and time. Review medications for side effects contributing to fall risk. Reinforce activity limits and safety measures with patient and family.

## 2021-05-18 ENCOUNTER — APPOINTMENT (OUTPATIENT)
Dept: OBGYN | Facility: CLINIC | Age: 51
End: 2021-05-18
Payer: COMMERCIAL

## 2021-05-18 VITALS
DIASTOLIC BLOOD PRESSURE: 79 MMHG | BODY MASS INDEX: 51.91 KG/M2 | SYSTOLIC BLOOD PRESSURE: 129 MMHG | HEART RATE: 104 BPM | WEIGHT: 293 LBS | HEIGHT: 63 IN

## 2021-05-18 PROCEDURE — 99213 OFFICE O/P EST LOW 20 MIN: CPT | Mod: 25

## 2021-05-18 PROCEDURE — 99072 ADDL SUPL MATRL&STAF TM PHE: CPT

## 2021-05-18 PROCEDURE — 99396 PREV VISIT EST AGE 40-64: CPT

## 2021-05-19 LAB
C TRACH RRNA SPEC QL NAA+PROBE: NOT DETECTED
HPV HIGH+LOW RISK DNA PNL CVX: NOT DETECTED
N GONORRHOEA RRNA SPEC QL NAA+PROBE: NOT DETECTED
SOURCE TP AMPLIFICATION: NORMAL

## 2021-05-22 LAB — CYTOLOGY CVX/VAG DOC THIN PREP: NORMAL

## 2021-05-25 ENCOUNTER — APPOINTMENT (OUTPATIENT)
Dept: ANTEPARTUM | Facility: CLINIC | Age: 51
End: 2021-05-25
Payer: COMMERCIAL

## 2021-05-25 ENCOUNTER — ASOB RESULT (OUTPATIENT)
Age: 51
End: 2021-05-25

## 2021-05-25 PROCEDURE — 76830 TRANSVAGINAL US NON-OB: CPT

## 2021-05-25 PROCEDURE — 99072 ADDL SUPL MATRL&STAF TM PHE: CPT

## 2021-05-25 PROCEDURE — 76856 US EXAM PELVIC COMPLETE: CPT | Mod: 59

## 2021-06-21 ENCOUNTER — APPOINTMENT (OUTPATIENT)
Dept: OBGYN | Facility: CLINIC | Age: 51
End: 2021-06-21
Payer: COMMERCIAL

## 2021-06-21 VITALS — DIASTOLIC BLOOD PRESSURE: 80 MMHG | BODY MASS INDEX: 56.69 KG/M2 | SYSTOLIC BLOOD PRESSURE: 124 MMHG | WEIGHT: 293 LBS

## 2021-06-21 PROCEDURE — 58558Z: CUSTOM

## 2021-06-21 PROCEDURE — 99072 ADDL SUPL MATRL&STAF TM PHE: CPT

## 2021-06-21 NOTE — PROCEDURE
[Hysteroscopy] : Hysteroscopy [Time out performed] : Pre-procedure time out performed.  Patient's name, date of birth and procedure confirmed. [Consent Obtained] : Consent obtained [Abnormal uterine bleeding] : abnormal uterine bleeding [Risks] : risks [Benefits] : benefits [Alternatives] : alternatives [Patient] : patient [Infection] : infection [Bleeding] : bleeding [Allergic Reaction] : allergic reaction [Lidocaine___ mL] : [unfilled] ~UmL of lidocaine [flexible] : Using aseptic technique a hysteroscopy was performed using a flexible hysteroscope [Sent to Pathology] : specimen was placed in buffered formalin and sent for pathology [Antibiotics given] : antibiotics not given [Hemostasis obtained] : hemostasis obtained [Tolerated Well] : Patient tolerated the procedure well [Aftercare instructions/regstrictions given and follow-up scheduled] : Aftercare instructions/restrictions given and follow-up scheduled [de-identified] : time out performed.\par Under sterile conditions and with paracervical block the cervix was dilated and endometrial sampling obtained and sent to pathology.\par hysteroscopic evaluation reveals large submucosal myoma which looks somewhat calcified. Irregular cavity noted. [de-identified] : fibroid uterus, large.

## 2021-06-23 LAB — CORE LAB BIOPSY: NORMAL

## 2021-07-19 ENCOUNTER — APPOINTMENT (OUTPATIENT)
Dept: OBGYN | Facility: CLINIC | Age: 51
End: 2021-07-19
Payer: COMMERCIAL

## 2021-07-19 VITALS
WEIGHT: 293 LBS | HEIGHT: 63 IN | DIASTOLIC BLOOD PRESSURE: 78 MMHG | SYSTOLIC BLOOD PRESSURE: 138 MMHG | BODY MASS INDEX: 51.91 KG/M2

## 2021-07-19 PROCEDURE — 99072 ADDL SUPL MATRL&STAF TM PHE: CPT

## 2021-07-19 PROCEDURE — 99213 OFFICE O/P EST LOW 20 MIN: CPT

## 2021-07-22 ENCOUNTER — EMERGENCY (EMERGENCY)
Facility: HOSPITAL | Age: 51
LOS: 1 days | Discharge: HOME | End: 2021-07-22
Attending: EMERGENCY MEDICINE
Payer: COMMERCIAL

## 2021-07-22 VITALS
SYSTOLIC BLOOD PRESSURE: 135 MMHG | HEIGHT: 63 IN | TEMPERATURE: 98 F | WEIGHT: 293 LBS | RESPIRATION RATE: 20 BRPM | OXYGEN SATURATION: 98 % | DIASTOLIC BLOOD PRESSURE: 79 MMHG | HEART RATE: 110 BPM

## 2021-07-22 DIAGNOSIS — Z98.890 OTHER SPECIFIED POSTPROCEDURAL STATES: Chronic | ICD-10-CM

## 2021-07-22 PROCEDURE — 12001 RPR S/N/AX/GEN/TRNK 2.5CM/<: CPT

## 2021-07-22 PROCEDURE — 90715 TDAP VACCINE 7 YRS/> IM: CPT

## 2021-07-22 PROCEDURE — 90471 IMMUNIZATION ADMIN: CPT

## 2021-07-22 PROCEDURE — 99283 EMERGENCY DEPT VISIT LOW MDM: CPT

## 2021-07-22 PROCEDURE — 99283 EMERGENCY DEPT VISIT LOW MDM: CPT | Mod: 25

## 2021-07-22 RX ORDER — TETANUS TOXOID, REDUCED DIPHTHERIA TOXOID AND ACELLULAR PERTUSSIS VACCINE, ADSORBED 5; 2.5; 8; 8; 2.5 [IU]/.5ML; [IU]/.5ML; UG/.5ML; UG/.5ML; UG/.5ML
0.5 SUSPENSION INTRAMUSCULAR ONCE
Refills: 0 | Status: COMPLETED | OUTPATIENT
Start: 2021-07-22 | End: 2021-07-22

## 2021-07-22 RX ADMIN — TETANUS TOXOID, REDUCED DIPHTHERIA TOXOID AND ACELLULAR PERTUSSIS VACCINE, ADSORBED 0.5 MILLILITER(S): 5; 2.5; 8; 8; 2.5 SUSPENSION INTRAMUSCULAR at 20:40
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
## 2021-07-22 NOTE — ED PROVIDER NOTE - PATIENT PORTAL LINK FT
You can access the FollowMyHealth Patient Portal offered by [facility] by registering at the following website: [email or web address]. By joining [employer]’s FollowMyHealth portal, you will also be able to view your health information using other applications (apps) compatible with our system.

## 2022-05-23 ENCOUNTER — APPOINTMENT (OUTPATIENT)
Dept: OBGYN | Facility: CLINIC | Age: 52
End: 2022-05-23
Payer: COMMERCIAL

## 2022-05-23 VITALS
HEIGHT: 63 IN | DIASTOLIC BLOOD PRESSURE: 70 MMHG | SYSTOLIC BLOOD PRESSURE: 130 MMHG | BODY MASS INDEX: 51.91 KG/M2 | WEIGHT: 293 LBS

## 2022-05-23 DIAGNOSIS — R10.2 PELVIC AND PERINEAL PAIN: ICD-10-CM

## 2022-05-23 DIAGNOSIS — N92.1 EXCESSIVE AND FREQUENT MENSTRUATION WITH IRREGULAR CYCLE: ICD-10-CM

## 2022-05-23 PROCEDURE — 99396 PREV VISIT EST AGE 40-64: CPT

## 2022-05-23 NOTE — COUNSELING
[Nutrition/ Exercise/ Weight Management] : nutrition, exercise, weight management [Body Image] : body image [Vitamins/Supplements] : vitamins/supplements [Sunscreen] : sunscreen [Smoking Cessation] : smoking cessation [Drugs/Alcohol] : drugs, alcohol [Breast Self Exam] : breast self exam

## 2022-05-25 LAB — CYTOLOGY CVX/VAG DOC THIN PREP: NORMAL

## 2022-11-29 ENCOUNTER — INPATIENT (INPATIENT)
Facility: HOSPITAL | Age: 52
LOS: 7 days | Discharge: HOME | DRG: 853 | End: 2022-12-07
Attending: OBSTETRICS & GYNECOLOGY | Admitting: OBSTETRICS & GYNECOLOGY
Payer: COMMERCIAL

## 2022-11-29 ENCOUNTER — FORM ENCOUNTER (OUTPATIENT)
Age: 52
End: 2022-11-29

## 2022-11-29 VITALS
HEIGHT: 67 IN | TEMPERATURE: 97 F | SYSTOLIC BLOOD PRESSURE: 107 MMHG | OXYGEN SATURATION: 98 % | DIASTOLIC BLOOD PRESSURE: 71 MMHG | HEART RATE: 88 BPM | RESPIRATION RATE: 18 BRPM | WEIGHT: 293 LBS

## 2022-11-29 DIAGNOSIS — Z98.890 OTHER SPECIFIED POSTPROCEDURAL STATES: Chronic | ICD-10-CM

## 2022-11-29 DIAGNOSIS — N85.8 OTHER SPECIFIED NONINFLAMMATORY DISORDERS OF UTERUS: ICD-10-CM

## 2022-11-29 LAB
ALBUMIN SERPL ELPH-MCNC: 3.2 G/DL — LOW (ref 3.3–5.2)
ALP SERPL-CCNC: 95 U/L — SIGNIFICANT CHANGE UP (ref 40–120)
ALT FLD-CCNC: 21 U/L — SIGNIFICANT CHANGE UP
ANION GAP SERPL CALC-SCNC: 12 MMOL/L — SIGNIFICANT CHANGE UP (ref 5–17)
ANISOCYTOSIS BLD QL: SLIGHT — SIGNIFICANT CHANGE UP
APPEARANCE UR: CLEAR — SIGNIFICANT CHANGE UP
AST SERPL-CCNC: 25 U/L — SIGNIFICANT CHANGE UP
BACTERIA # UR AUTO: ABNORMAL
BASE EXCESS BLDV CALC-SCNC: -1.1 MMOL/L — SIGNIFICANT CHANGE UP (ref -2–3)
BASOPHILS # BLD AUTO: 0.06 K/UL — SIGNIFICANT CHANGE UP (ref 0–0.2)
BASOPHILS NFR BLD AUTO: 0.9 % — SIGNIFICANT CHANGE UP (ref 0–2)
BILIRUB SERPL-MCNC: 0.7 MG/DL — SIGNIFICANT CHANGE UP (ref 0.4–2)
BILIRUB UR-MCNC: ABNORMAL
BUN SERPL-MCNC: 15.9 MG/DL — SIGNIFICANT CHANGE UP (ref 8–20)
BURR CELLS BLD QL SMEAR: PRESENT — SIGNIFICANT CHANGE UP
CA-I SERPL-SCNC: 1.19 MMOL/L — SIGNIFICANT CHANGE UP (ref 1.15–1.33)
CALCIUM SERPL-MCNC: 9.3 MG/DL — SIGNIFICANT CHANGE UP (ref 8.4–10.5)
CHLORIDE BLDV-SCNC: 104 MMOL/L — SIGNIFICANT CHANGE UP (ref 96–108)
CHLORIDE SERPL-SCNC: 103 MMOL/L — SIGNIFICANT CHANGE UP (ref 96–108)
CO2 SERPL-SCNC: 25 MMOL/L — SIGNIFICANT CHANGE UP (ref 22–29)
COLOR SPEC: YELLOW — SIGNIFICANT CHANGE UP
CREAT SERPL-MCNC: 0.92 MG/DL — SIGNIFICANT CHANGE UP (ref 0.5–1.3)
DIFF PNL FLD: ABNORMAL
EGFR: 74 ML/MIN/1.73M2 — SIGNIFICANT CHANGE UP
EOSINOPHIL # BLD AUTO: 0 K/UL — SIGNIFICANT CHANGE UP (ref 0–0.5)
EOSINOPHIL NFR BLD AUTO: 0 % — SIGNIFICANT CHANGE UP (ref 0–6)
EPI CELLS # UR: SIGNIFICANT CHANGE UP
GAS PNL BLDV: 136 MMOL/L — SIGNIFICANT CHANGE UP (ref 136–145)
GAS PNL BLDV: SIGNIFICANT CHANGE UP
GIANT PLATELETS BLD QL SMEAR: PRESENT — SIGNIFICANT CHANGE UP
GLUCOSE BLDV-MCNC: 185 MG/DL — HIGH (ref 70–99)
GLUCOSE SERPL-MCNC: 183 MG/DL — HIGH (ref 70–99)
GLUCOSE UR QL: NEGATIVE — SIGNIFICANT CHANGE UP
HCG SERPL-ACNC: <4 MIU/ML — SIGNIFICANT CHANGE UP
HCO3 BLDV-SCNC: 26 MMOL/L — SIGNIFICANT CHANGE UP (ref 22–29)
HCT VFR BLD CALC: 35.6 % — SIGNIFICANT CHANGE UP (ref 34.5–45)
HCT VFR BLDA CALC: 35 % — SIGNIFICANT CHANGE UP
HGB BLD CALC-MCNC: 11.7 G/DL — SIGNIFICANT CHANGE UP (ref 11.7–16.1)
HGB BLD-MCNC: 11.1 G/DL — LOW (ref 11.5–15.5)
HYPOCHROMIA BLD QL: SLIGHT — SIGNIFICANT CHANGE UP
KETONES UR-MCNC: ABNORMAL
LACTATE BLDV-MCNC: 2.6 MMOL/L — HIGH (ref 0.5–2)
LEUKOCYTE ESTERASE UR-ACNC: ABNORMAL
LIDOCAIN IGE QN: 18 U/L — LOW (ref 22–51)
LYMPHOCYTES # BLD AUTO: 0.4 K/UL — LOW (ref 1–3.3)
LYMPHOCYTES # BLD AUTO: 5.6 % — LOW (ref 13–44)
MANUAL SMEAR VERIFICATION: SIGNIFICANT CHANGE UP
MCHC RBC-ENTMCNC: 27 PG — SIGNIFICANT CHANGE UP (ref 27–34)
MCHC RBC-ENTMCNC: 31.2 GM/DL — LOW (ref 32–36)
MCV RBC AUTO: 86.6 FL — SIGNIFICANT CHANGE UP (ref 80–100)
MONOCYTES # BLD AUTO: 0.46 K/UL — SIGNIFICANT CHANGE UP (ref 0–0.9)
MONOCYTES NFR BLD AUTO: 6.5 % — SIGNIFICANT CHANGE UP (ref 2–14)
NEUTROPHILS # BLD AUTO: 5.82 K/UL — SIGNIFICANT CHANGE UP (ref 1.8–7.4)
NEUTROPHILS NFR BLD AUTO: 59.3 % — SIGNIFICANT CHANGE UP (ref 43–77)
NEUTS BAND # BLD: 23.1 % — HIGH (ref 0–8)
NITRITE UR-MCNC: POSITIVE
OVALOCYTES BLD QL SMEAR: SLIGHT — SIGNIFICANT CHANGE UP
PCO2 BLDV: 53 MMHG — HIGH (ref 39–42)
PH BLDV: 7.29 — LOW (ref 7.32–7.43)
PH UR: 5 — SIGNIFICANT CHANGE UP (ref 5–8)
PLAT MORPH BLD: NORMAL — SIGNIFICANT CHANGE UP
PLATELET # BLD AUTO: 557 K/UL — HIGH (ref 150–400)
PO2 BLDV: <42 MMHG — SIGNIFICANT CHANGE UP (ref 25–45)
POIKILOCYTOSIS BLD QL AUTO: SLIGHT — SIGNIFICANT CHANGE UP
POLYCHROMASIA BLD QL SMEAR: SLIGHT — SIGNIFICANT CHANGE UP
POTASSIUM BLDV-SCNC: 4.3 MMOL/L — SIGNIFICANT CHANGE UP (ref 3.5–5.1)
POTASSIUM SERPL-MCNC: 4.4 MMOL/L — SIGNIFICANT CHANGE UP (ref 3.5–5.3)
POTASSIUM SERPL-SCNC: 4.4 MMOL/L — SIGNIFICANT CHANGE UP (ref 3.5–5.3)
PROT SERPL-MCNC: 7.9 G/DL — SIGNIFICANT CHANGE UP (ref 6.6–8.7)
PROT UR-MCNC: 30 MG/DL
RBC # BLD: 4.11 M/UL — SIGNIFICANT CHANGE UP (ref 3.8–5.2)
RBC # FLD: 15.9 % — HIGH (ref 10.3–14.5)
RBC BLD AUTO: SIGNIFICANT CHANGE UP
RBC CASTS # UR COMP ASSIST: SIGNIFICANT CHANGE UP /HPF (ref 0–4)
SAO2 % BLDV: 57.2 % — SIGNIFICANT CHANGE UP
SCHISTOCYTES BLD QL AUTO: SLIGHT — SIGNIFICANT CHANGE UP
SMUDGE CELLS # BLD: PRESENT — SIGNIFICANT CHANGE UP
SODIUM SERPL-SCNC: 139 MMOL/L — SIGNIFICANT CHANGE UP (ref 135–145)
SP GR SPEC: 1.01 — SIGNIFICANT CHANGE UP (ref 1.01–1.02)
UROBILINOGEN FLD QL: 1
VARIANT LYMPHS # BLD: 4.6 % — SIGNIFICANT CHANGE UP (ref 0–6)
WBC # BLD: 7.06 K/UL — SIGNIFICANT CHANGE UP (ref 3.8–10.5)
WBC # FLD AUTO: 7.06 K/UL — SIGNIFICANT CHANGE UP (ref 3.8–10.5)
WBC UR QL: SIGNIFICANT CHANGE UP /HPF (ref 0–5)

## 2022-11-29 PROCEDURE — 99285 EMERGENCY DEPT VISIT HI MDM: CPT

## 2022-11-29 PROCEDURE — G1004: CPT

## 2022-11-29 PROCEDURE — 74177 CT ABD & PELVIS W/CONTRAST: CPT | Mod: 26,ME

## 2022-11-29 RX ORDER — MORPHINE SULFATE 50 MG/1
4 CAPSULE, EXTENDED RELEASE ORAL ONCE
Refills: 0 | Status: DISCONTINUED | OUTPATIENT
Start: 2022-11-29 | End: 2022-11-29

## 2022-11-29 RX ORDER — SODIUM CHLORIDE 9 MG/ML
1000 INJECTION, SOLUTION INTRAVENOUS ONCE
Refills: 0 | Status: COMPLETED | OUTPATIENT
Start: 2022-11-29 | End: 2022-11-29

## 2022-11-29 RX ORDER — PIPERACILLIN AND TAZOBACTAM 4; .5 G/20ML; G/20ML
3.38 INJECTION, POWDER, LYOPHILIZED, FOR SOLUTION INTRAVENOUS ONCE
Refills: 0 | Status: COMPLETED | OUTPATIENT
Start: 2022-11-29 | End: 2022-11-29

## 2022-11-29 RX ORDER — VANCOMYCIN HCL 1 G
1000 VIAL (EA) INTRAVENOUS ONCE
Refills: 0 | Status: COMPLETED | OUTPATIENT
Start: 2022-11-29 | End: 2022-11-29

## 2022-11-29 RX ORDER — ACETAMINOPHEN 500 MG
1000 TABLET ORAL ONCE
Refills: 0 | Status: COMPLETED | OUTPATIENT
Start: 2022-11-29 | End: 2022-11-29

## 2022-11-29 RX ORDER — DIPHENHYDRAMINE HCL 50 MG
50 CAPSULE ORAL ONCE
Refills: 0 | Status: COMPLETED | OUTPATIENT
Start: 2022-11-29 | End: 2022-11-29

## 2022-11-29 RX ORDER — ONDANSETRON 8 MG/1
4 TABLET, FILM COATED ORAL ONCE
Refills: 0 | Status: COMPLETED | OUTPATIENT
Start: 2022-11-29 | End: 2022-11-29

## 2022-11-29 RX ADMIN — SODIUM CHLORIDE 1000 MILLILITER(S): 9 INJECTION, SOLUTION INTRAVENOUS at 12:16

## 2022-11-29 RX ADMIN — ONDANSETRON 4 MILLIGRAM(S): 8 TABLET, FILM COATED ORAL at 13:56

## 2022-11-29 RX ADMIN — MORPHINE SULFATE 4 MILLIGRAM(S): 50 CAPSULE, EXTENDED RELEASE ORAL at 21:19

## 2022-11-29 RX ADMIN — Medication 250 MILLIGRAM(S): at 16:36

## 2022-11-29 RX ADMIN — PIPERACILLIN AND TAZOBACTAM 200 GRAM(S): 4; .5 INJECTION, POWDER, LYOPHILIZED, FOR SOLUTION INTRAVENOUS at 15:22

## 2022-11-29 RX ADMIN — MORPHINE SULFATE 4 MILLIGRAM(S): 50 CAPSULE, EXTENDED RELEASE ORAL at 22:50

## 2022-11-29 RX ADMIN — ONDANSETRON 4 MILLIGRAM(S): 8 TABLET, FILM COATED ORAL at 10:59

## 2022-11-29 RX ADMIN — MORPHINE SULFATE 4 MILLIGRAM(S): 50 CAPSULE, EXTENDED RELEASE ORAL at 23:32

## 2022-11-29 RX ADMIN — MORPHINE SULFATE 4 MILLIGRAM(S): 50 CAPSULE, EXTENDED RELEASE ORAL at 21:49

## 2022-11-29 RX ADMIN — Medication 125 MILLIGRAM(S): at 10:55

## 2022-11-29 RX ADMIN — SODIUM CHLORIDE 1000 MILLILITER(S): 9 INJECTION, SOLUTION INTRAVENOUS at 12:34

## 2022-11-29 RX ADMIN — MORPHINE SULFATE 4 MILLIGRAM(S): 50 CAPSULE, EXTENDED RELEASE ORAL at 13:56

## 2022-11-29 RX ADMIN — MORPHINE SULFATE 4 MILLIGRAM(S): 50 CAPSULE, EXTENDED RELEASE ORAL at 14:19

## 2022-11-29 RX ADMIN — Medication 400 MILLIGRAM(S): at 12:34

## 2022-11-29 RX ADMIN — Medication 50 MILLIGRAM(S): at 15:31

## 2022-11-29 NOTE — ED ADULT TRIAGE NOTE - CHIEF COMPLAINT QUOTE
Pt BIBA, c/o abdominal pain x 1 week, vomited in ambulance, states "Janet been trying to control the pain with tylenol and advil"

## 2022-11-29 NOTE — H&P ADULT - ATTENDING COMMENTS
51 yo with abdominal pain and temp on presentation.  T2DM and HTN.  Suspected degenerating fibroids    I agree with resident assessment and plan. Admit for pain management and IV antibiotics.  Pelvic MRI for further imaging of uterine mass  repeat blood work  follow up cultures  re evaluate patient in a.m.  notify primary Gyn   Patient previously seen by Dr Layne Olson

## 2022-11-29 NOTE — CONSULT NOTE ADULT - ATTENDING COMMENTS
51 yo female presents to ER secondary to abdominal pain x 1 week and emesis and gross hematuria.  PMHX - T2DM, HTN, UC and history of uterine fibroids.  Temp in .6.   Patient reports pain is stabbing in nature and initially right sided and now diffuse.      CT Abd/Pelvis   enlarged uterus with moderate necrosis of 16.3 cm right fundal uterine mass and foci of gas.                         small to moderate amount of abd/pelvic ascites noted.  Consider malignancy      I agree with resident assessment   Admit patient for continued IV antibiotics and further evaluation of uterine mass. Pelvic MRI.  Notify primary Gyn of patient status.  Monitor glucose levels and repeat lab work this a.m.    Dr Olson

## 2022-11-29 NOTE — ED ADULT NURSE NOTE - OBJECTIVE STATEMENT
Pt is a 52YOF who is here for abdominal pain, pt states that she has a hx of colitis and has had abdominal pain with nausea and vomiting for about a week, pt states she has been taking advil and tylenol with no success, pt states she has a GI MD but did not reach out to them, came to the Emergency Department for pain control. Pt states she has no fevers, no chills, denies any changes to her bowel or elimination patterns, on palpation pt states abdominal pain is near the umbilicus, no bloody vomitus, pt A&O4

## 2022-11-29 NOTE — ED PROVIDER NOTE - PHYSICAL EXAMINATION
Gen: Actively vomiting, standing over bed. Reporting she cannot get into the bed due to abd pain and vomiting and must remain standing.  Head: NC/AT  Neck: trachea midline  Card: regular rate and rhythm  Resp:  CTAB  Abd: soft, obese, diffusely tender  Ext: no deformities above reported baseline  Neuro:  A&O, no motor or sensory deficits above reported baseline  Skin:  Warm and dry as visualized  Psych:  Normal affect and mood

## 2022-11-29 NOTE — H&P ADULT - NSHPLABSRESULTS_GEN_ALL_CORE
Urinalysis Basic - ( 2022 19:02 )    Color: Yellow / Appearance: Clear / S.010 / pH: x  Gluc: x / Ketone: Trace  / Bili: Small / Urobili: 1   Blood: x / Protein: 30 mg/dL / Nitrite: Positive   Leuk Esterase: Trace / RBC: 0-2 /HPF / WBC 3-5 /HPF   Sq Epi: x / Non Sq Epi: Few / Bacteria: Few    Imaging:   Ct Abd/Pelvis  <start of copied text>  IMPRESSION:  Markedly abnormal and enlarged uterus with suspected necrosis of a 16.3 cm right fundal uterine mass. The mass contains small foci of gas, raising concern for superimposed infection.    Abnormal small to moderate amount of abdominal and pelvic ascites with anterior peritoneal soft tissue stranding. In the presence of the large uterine mass, differential considerations does include possible uterine malignancy such as leiomyosarcoma with peritoneal carcinomatosis.    Recommend GYN consultation. Further evaluation with MRI of the pelvis should also be considered.

## 2022-11-29 NOTE — ED PROVIDER NOTE - OBJECTIVE STATEMENT
52-year-old female patient with past medical history of hypertension, diabetes, hyperlipidemia, UC, D&C presents to ED with multiple episodes of vomiting this morning.  She also reports 1 week of worsening generalized abdominal pain.  She has never had similar symptoms before.  She denies fevers, chills, dysuria, hematuria, back pain, chest pain, shortness of breath, or any other complaints. Last used marijuana 2 months ago.

## 2022-11-29 NOTE — ED PROVIDER NOTE - NSICDXPASTMEDICALHX_GEN_ALL_CORE_FT
PAST MEDICAL HISTORY:  Anemia     Asthma     Diabetes     Hypertension     Sleep apnea     Ulcerative colitis

## 2022-11-29 NOTE — ED ADULT NURSE REASSESSMENT NOTE - NS ED NURSE REASSESS COMMENT FT1
assumed pt care at 1930 - awaiting for OB Gyne for assessment and pelvic exam. pt awake, alert and oriented. speaking in full sentences. pt unable to lay flat on bed due to pain.

## 2022-11-29 NOTE — H&P ADULT - ASSESSMENT
A: 52F presented with 1 week of abdominal pain accompanied by vomiting and fever in ED admitted for work up of a suspected degenerative fibroid with need for imaging.     Plan:  - Admit for IV antibiotics  - Admission labs  - MRI to further evaluate uterine mass   - pain control - morphine 4mg IV PRN with standing tylenol and motrin  - regular diet   - f/u urine, blood cx  - AM CBC and CMP    A: 52F presented with 1 week of abdominal pain accompanied by vomiting and fever in ED admitted for work up of a suspected degenerative fibroid with need for imaging.     Plan:  - Admit for IV antibiotics  - Admission labs  - MRI to further evaluate uterine mass   - pain control - morphine 4mg IV PRN with standing tylenol and motrin  - regular diet   - f/u urine, blood cx  - AM CBC and CMP       Discussed with Dr. Olson

## 2022-11-29 NOTE — H&P ADULT - NSHPPHYSICALEXAM_GEN_ALL_CORE
Vital Signs Last 24 Hrs  T(C): 36.8 (29 Nov 2022 15:32), Max: 38.1 (29 Nov 2022 11:11)  T(F): 98.3 (29 Nov 2022 15:32), Max: 100.5 (29 Nov 2022 11:11)  HR: 110 (29 Nov 2022 15:32) (88 - 139)  BP: 100/69 (29 Nov 2022 15:32) (100/69 - 124/80)  RR: 19 (29 Nov 2022 15:32) (18 - 20)  SpO2: 98% (29 Nov 2022 15:32) (95% - 98%)    Exam:  Gen: awake and alert, NAD   Abd: soft, non-tender to percussion. diffusely ttp.  Ext: no edema  SSE: cervix appeared closed on exam, no masses noted. No blood in vault, no abnormal discharge. Bilateral adnexal tenderness rated at 10/10.

## 2022-11-29 NOTE — ED PROVIDER NOTE - ATTENDING CONTRIBUTION TO CARE
52yoF; with PMH signif for DM, HTN, UC, BMI=50; now p/w abd pain--began RLQ, cramping/pressure, now radiating diffusely, x1 day, associated with nausea and vomiting--x5-6 episodes, nbnb.  denies diarrhea. denies f/c/s. denies hematuria, frequency, urgency.  denies cough. denies cp/sob.  General:     NAD  Head:     NC/AT, EOMI, oral mucosa moist  Neck:     trachea midline  Lungs:     CTA b/l, no w/r/r  CVS:     S1S2, RRR, no m/g/r  Abd:     +BS, s/TTP @ RLQ = RUQ, no rebound, +voluntary guarding, nd, no organomegaly  Ext:    2+ radial and pedal pulses, no c/c/e  Neuro: grossly intact  A/P:  52yoF p/w abd pain and vomiting.   -labs, ivf, zofran, ct a/p

## 2022-11-29 NOTE — H&P ADULT - HISTORY OF PRESENT ILLNESS
ET is a 52F with PMH of uterine fibroids presents to ED with 1 week of abdominal pain accompanied by vomiting and blood in the urine. Pain started 1 week prior and was initially located in the RUQ/RLQ, but has worsened today and is now diffuse. Pain is described as stabbing and episodic; episodes last approx. 1 hour and are partially relieved by Tylenol/ibuprofen. Pt also reports first-time episode of blood in the urine today, does not think it is vaginal bleeding. She reports no dysuria but reports increased urge. Pt unsure of LMP, states she has had irregular vaginal bleeding since initial fibroid diagnosis in 2015, sees Dr. Tillman outpatient and was referred to GYN ONC for a hysterectomy.  Pt endorses multiple episodes of nonblood, nonbilious emesis today and 1 episode on Sunday along with decreased PO intake. Denies fevers at home but 100.6F recorded in ED. Denies chills, headaches, constipation/diarrhea, hematochezia or melena.     ED Course: received 1L fluid bolus x2. S/p Zosyn, Vancomycin, Zofran, Tylenol, solumedrol. Received 4mg IV morphine with relief. Urine, blood cx collected.    OB Hx: G0  Gyn Hx: uterine fibroids, dx 2015. Denies other gyn hx.  PMH: uterine fibroids, T2DM, HTN, asthma, UC, anemia  PSH: D&Cx2   Medications: 500mg metformin, Valsartan, Amlodipine, Metoprolol, Flonase, Atorvastatin, iron, potassium  Allergies: codeine, iodine, environmental   Fam Hx: HTN, diabetes

## 2022-11-29 NOTE — CONSULT NOTE ADULT - ASSESSMENT
CONSULT NOTE INCOMPLETE    52F with PMH of uterine fibroids, T2DM, HTN, asthma, UC, anemia, hypokalemia, D&Cx2 p/w 1 week of abdominal pain accompanied by vomiting gross hematuria. Diffusely tender on exam with appreciable midline mass. Labs significant for 23.1% bands. CT abd/pelvis reveals abnormal enlarged uterus with 16.3 right fundal uterine mass with suspected necrosis and small foci of gas c/f superimposed infection, along with pelvic/abdominal ascites and peritoneal soft tissue stranding.     Plan:   CONSULT NOTE INCOMPLETE    52F with PMH of uterine fibroids, T2DM, HTN, asthma, UC, anemia, hypokalemia, D&Cx2 p/w 1 week of abdominal pain accompanied by vomiting and fever in ED. Diffusely tender on abdominal exam with bilateral adnexal tenderness. Labs significant for 23.1% bands. CT abd/pelvis reveals abnormal enlarged uterus with 16.3 right fundal uterine mass with suspected necrosis and small foci of gas, in addition to pelvic/abdominal ascites and peritoneal soft tissue stranding. Ddx includes degenerative fibroid with c/f superimposed infection vs. malignancy.    Plan:  - Admit for IV antibiotics  - mIVF  - MRI to further evaluate uterine mass   - c/w home metformin 500mg  - pain control - morphine 4mg IV PRN   - regular diet   - f/u urine, blood cx   CONSULT NOTE INCOMPLETE    52F with PMH of uterine fibroids, T2DM, HTN, asthma, UC, anemia, hypokalemia, D&Cx2 p/w 1 week of abdominal pain accompanied by vomiting and fever in ED. Diffusely tender on abdominal exam with bilateral adnexal tenderness. Labs significant for 23.1% bands. CT abd/pelvis reveals abnormal enlarged uterus with 16.3 right fundal uterine mass with suspected necrosis and small foci of gas, in addition to pelvic/abdominal ascites and peritoneal soft tissue stranding.   Plan:  - Admit to GYN

## 2022-11-29 NOTE — CONSULT NOTE ADULT - SUBJECTIVE AND OBJECTIVE BOX
HPI:  ET is a 52F with PMH of uterine fibroids, T2DM, HTN, asthma, UC, anemia, hypokalemia, D&Cx2 presenting to ED with 1 week of abdominal pain accompanied by vomiting and gross hematuria. Pain started 1 week prior and was initially located in the RUQ/RLQ, but has worsened today and is now diffuse. Pain is described as stabbing and episodic; episodes last approx. 1 hour and are partially relieved by Tylenol/ibuprofen. Pt also reports first-time episode of painless hematuria today. Pt unsure of LMP, states she has had irregular vaginal bleeding since initial fibroid diagnosis in 2015. No dysuria but reports increased urge. Pt endorses multiple episodes of NBNB emesis today and 1 episode on Sunday along with decreased PO intake. Denies fevers at home, chills, headaches, constipation/diarrhea, hematochezia or melena.     ED Course: received 1L fluid bolus x2. S/p Zosyn, Vancomycin, Zofran, Tylenol, solumedrol. Received 4mg IV morphine with relief.    OB Hx: denies  Gyn Hx: uterine fibroids, dx 2015. Denies other gyn hx.  PMH: uterine fibroids, T2DM, HTN, asthma, UC, anemia, hypokalemia  PSH: D&Cx2   Medications: 500mg metformin, Valsartan, Amlodipine, Metoprolol, Flonase, Atorvastatin, iron, potassium  Allergies: codeine, iodine, environmental   Social Hx: last marijuana use several months prior     Vital Signs Last 24 Hrs  T(C): 36.8 (29 Nov 2022 15:32), Max: 38.1 (29 Nov 2022 11:11)  T(F): 98.3 (29 Nov 2022 15:32), Max: 100.5 (29 Nov 2022 11:11)  HR: 110 (29 Nov 2022 15:32) (88 - 139)  BP: 100/69 (29 Nov 2022 15:32) (100/69 - 124/80)  RR: 19 (29 Nov 2022 15:32) (18 - 20)  SpO2: 98% (29 Nov 2022 15:32) (95% - 98%)    Exam:  Gen: awake and alert, NAD   Abd: soft, non-tender to percussion. diffusely ttp, point tenderness in periumbilical region with appreciable midline mass  Ext:   SVE:                                    11.1   7.06  )-----------( 557      ( 29 Nov 2022 10:25 )             35.6     11-29    139  |  103  |  15.9  ----------------------------<  183<H>  4.4   |  25.0  |  0.92    Ca    9.3      29 Nov 2022 10:25    TPro  7.9  /  Alb  3.2<L>  /  TBili  0.7  /  DBili  x   /  AST  25  /  ALT  21  /  AlkPhos  95  11-29    Imaging:   Ct Abd/Pelvis  <start of copied text>  IMPRESSION:  Markedly abnormal and enlarged uterus with suspected necrosis of a 16.3 cm right fundal uterine mass. The mass contains small foci of gas, raising concern for superimposed infection.    Abnormal small to moderate amount of abdominal and pelvic ascites with anterior peritoneal soft tissue stranding. In the presence of the large uterine mass, differential considerations does include possible uterine malignancy such as leiomyosarcoma with peritoneal carcinomatosis.    Recommend GYN consultation. Further evaluation with MRI of the pelvis should also be considered.  <end copied text>     HPI:  ET is a 52F with PMH of uterine fibroids, T2DM, HTN, asthma, UC, anemia, hypokalemia, D&Cx2 presenting to ED with 1 week of abdominal pain accompanied by vomiting and blood in the urine. Pain started 1 week prior and was initially located in the RUQ/RLQ, but has worsened today and is now diffuse. Pain is described as stabbing and episodic; episodes last approx. 1 hour and are partially relieved by Tylenol/ibuprofen. Pt also reports first-time episode of blood in the urine today, does not think it is vaginal bleeding. Pt unsure of LMP, states she has had irregular vaginal bleeding since initial fibroid diagnosis in 2015, sees Dr. Tillman outpatient. No dysuria but reports increased urge. Pt endorses multiple episodes of NBNB emesis today and 1 episode on Sunday along with decreased PO intake. Denies fevers at home but 100.6F recorded in ED. Denies chills, headaches, constipation/diarrhea, hematochezia or melena.     ED Course: received 1L fluid bolus x2. S/p Zosyn, Vancomycin, Zofran, Tylenol, solumedrol. Received 4mg IV morphine with relief. Urine, blood cx collected.    OB Hx: G0  Gyn Hx: uterine fibroids, dx 2015. Denies other gyn hx.  PMH: uterine fibroids, T2DM, HTN, asthma, UC, anemia, hypokalemia  PSH: D&Cx2   Medications: 500mg metformin, Valsartan, Amlodipine, Metoprolol, Flonase, Atorvastatin, iron, potassium  Allergies: codeine, iodine, environmental   Fam Hx: HTN, diabetes      Vital Signs Last 24 Hrs  T(C): 36.8 (29 Nov 2022 15:32), Max: 38.1 (29 Nov 2022 11:11)  T(F): 98.3 (29 Nov 2022 15:32), Max: 100.5 (29 Nov 2022 11:11)  HR: 110 (29 Nov 2022 15:32) (88 - 139)  BP: 100/69 (29 Nov 2022 15:32) (100/69 - 124/80)  RR: 19 (29 Nov 2022 15:32) (18 - 20)  SpO2: 98% (29 Nov 2022 15:32) (95% - 98%)    Exam:  Gen: awake and alert, NAD   Abd: soft, non-tender to percussion. diffusely ttp.  Ext: no edema  SSE: cervix appeared closed on exam, no masses noted. No blood in vault, no abnormal discharge. Bilateral adnexal tenderness rated at 10/10.                                    11.1   7.06  )-----------( 557      ( 29 Nov 2022 10:25 )             35.6     11-29    139  |  103  |  15.9  ----------------------------<  183<H>  4.4   |  25.0  |  0.92    Ca    9.3      29 Nov 2022 10:25    TPro  7.9  /  Alb  3.2<L>  /  TBili  0.7  /  DBili  x   /  AST  25  /  ALT  21  /  AlkPhos  95  11-29    Imaging:   Ct Abd/Pelvis  <start of copied text>  IMPRESSION:  Markedly abnormal and enlarged uterus with suspected necrosis of a 16.3 cm right fundal uterine mass. The mass contains small foci of gas, raising concern for superimposed infection.    Abnormal small to moderate amount of abdominal and pelvic ascites with anterior peritoneal soft tissue stranding. In the presence of the large uterine mass, differential considerations does include possible uterine malignancy such as leiomyosarcoma with peritoneal carcinomatosis.    Recommend GYN consultation. Further evaluation with MRI of the pelvis should also be considered.  <end copied text>

## 2022-11-29 NOTE — ED PROVIDER NOTE - CLINICAL SUMMARY MEDICAL DECISION MAKING FREE TEXT BOX
51 y/o F pt w/ abd pain and nausea/vomiting. Will get labs, zofran, toradol. Pt has allergy to contrast, will give steroids and benadryl prior to scanning.

## 2022-11-30 ENCOUNTER — TRANSCRIPTION ENCOUNTER (OUTPATIENT)
Age: 52
End: 2022-11-30

## 2022-11-30 ENCOUNTER — RESULT REVIEW (OUTPATIENT)
Age: 52
End: 2022-11-30

## 2022-11-30 LAB
A1C WITH ESTIMATED AVERAGE GLUCOSE RESULT: 6.6 % — HIGH (ref 4–5.6)
ABO RH CONFIRMATION: SIGNIFICANT CHANGE UP
ALBUMIN SERPL ELPH-MCNC: 2.6 G/DL — LOW (ref 3.3–5.2)
ALBUMIN SERPL ELPH-MCNC: 2.7 G/DL — LOW (ref 3.3–5.2)
ALP SERPL-CCNC: 60 U/L — SIGNIFICANT CHANGE UP (ref 40–120)
ALP SERPL-CCNC: 64 U/L — SIGNIFICANT CHANGE UP (ref 40–120)
ALT FLD-CCNC: 14 U/L — SIGNIFICANT CHANGE UP
ALT FLD-CCNC: 14 U/L — SIGNIFICANT CHANGE UP
ANION GAP SERPL CALC-SCNC: 13 MMOL/L — SIGNIFICANT CHANGE UP (ref 5–17)
ANION GAP SERPL CALC-SCNC: 16 MMOL/L — SIGNIFICANT CHANGE UP (ref 5–17)
APTT BLD: 25.8 SEC — LOW (ref 27.5–35.5)
AST SERPL-CCNC: 12 U/L — SIGNIFICANT CHANGE UP
AST SERPL-CCNC: 15 U/L — SIGNIFICANT CHANGE UP
BILIRUB SERPL-MCNC: 1.2 MG/DL — SIGNIFICANT CHANGE UP (ref 0.4–2)
BILIRUB SERPL-MCNC: 1.4 MG/DL — SIGNIFICANT CHANGE UP (ref 0.4–2)
BLD GP AB SCN SERPL QL: SIGNIFICANT CHANGE UP
BUN SERPL-MCNC: 26.9 MG/DL — HIGH (ref 8–20)
BUN SERPL-MCNC: 29.6 MG/DL — HIGH (ref 8–20)
CALCIUM SERPL-MCNC: 8.8 MG/DL — SIGNIFICANT CHANGE UP (ref 8.4–10.5)
CALCIUM SERPL-MCNC: 9 MG/DL — SIGNIFICANT CHANGE UP (ref 8.4–10.5)
CHLORIDE SERPL-SCNC: 102 MMOL/L — SIGNIFICANT CHANGE UP (ref 96–108)
CHLORIDE SERPL-SCNC: 97 MMOL/L — SIGNIFICANT CHANGE UP (ref 96–108)
CO2 SERPL-SCNC: 21 MMOL/L — LOW (ref 22–29)
CO2 SERPL-SCNC: 22 MMOL/L — SIGNIFICANT CHANGE UP (ref 22–29)
CREAT SERPL-MCNC: 1.88 MG/DL — HIGH (ref 0.5–1.3)
CREAT SERPL-MCNC: 1.95 MG/DL — HIGH (ref 0.5–1.3)
EGFR: 30 ML/MIN/1.73M2 — LOW
EGFR: 32 ML/MIN/1.73M2 — LOW
ESTIMATED AVERAGE GLUCOSE: 143 MG/DL — HIGH (ref 68–114)
FIBRINOGEN PPP-MCNC: >1400 MG/DL — CRITICAL HIGH (ref 330–520)
GLUCOSE BLDC GLUCOMTR-MCNC: 110 MG/DL — HIGH (ref 70–99)
GLUCOSE BLDC GLUCOMTR-MCNC: 144 MG/DL — HIGH (ref 70–99)
GLUCOSE SERPL-MCNC: 132 MG/DL — HIGH (ref 70–99)
GLUCOSE SERPL-MCNC: 167 MG/DL — HIGH (ref 70–99)
HCT VFR BLD CALC: 30 % — LOW (ref 34.5–45)
HCT VFR BLD CALC: 30.1 % — LOW (ref 34.5–45)
HGB BLD-MCNC: 9.3 G/DL — LOW (ref 11.5–15.5)
HGB BLD-MCNC: 9.4 G/DL — LOW (ref 11.5–15.5)
INR BLD: 1.5 RATIO — HIGH (ref 0.88–1.16)
LACTATE SERPL-SCNC: 3.1 MMOL/L — HIGH (ref 0.5–2)
MCHC RBC-ENTMCNC: 26.6 PG — LOW (ref 27–34)
MCHC RBC-ENTMCNC: 26.9 PG — LOW (ref 27–34)
MCHC RBC-ENTMCNC: 31 GM/DL — LOW (ref 32–36)
MCHC RBC-ENTMCNC: 31.2 GM/DL — LOW (ref 32–36)
MCV RBC AUTO: 85 FL — SIGNIFICANT CHANGE UP (ref 80–100)
MCV RBC AUTO: 86.7 FL — SIGNIFICANT CHANGE UP (ref 80–100)
PLATELET # BLD AUTO: 481 K/UL — HIGH (ref 150–400)
PLATELET # BLD AUTO: 502 K/UL — HIGH (ref 150–400)
POTASSIUM SERPL-MCNC: 4.4 MMOL/L — SIGNIFICANT CHANGE UP (ref 3.5–5.3)
POTASSIUM SERPL-MCNC: 4.6 MMOL/L — SIGNIFICANT CHANGE UP (ref 3.5–5.3)
POTASSIUM SERPL-SCNC: 4.4 MMOL/L — SIGNIFICANT CHANGE UP (ref 3.5–5.3)
POTASSIUM SERPL-SCNC: 4.6 MMOL/L — SIGNIFICANT CHANGE UP (ref 3.5–5.3)
PROT SERPL-MCNC: 7.1 G/DL — SIGNIFICANT CHANGE UP (ref 6.6–8.7)
PROT SERPL-MCNC: 7.6 G/DL — SIGNIFICANT CHANGE UP (ref 6.6–8.7)
PROTHROM AB SERPL-ACNC: 17.5 SEC — HIGH (ref 10.5–13.4)
RAPID RVP RESULT: SIGNIFICANT CHANGE UP
RBC # BLD: 3.46 M/UL — LOW (ref 3.8–5.2)
RBC # BLD: 3.54 M/UL — LOW (ref 3.8–5.2)
RBC # FLD: 16 % — HIGH (ref 10.3–14.5)
RBC # FLD: 16.3 % — HIGH (ref 10.3–14.5)
SARS-COV-2 RNA SPEC QL NAA+PROBE: SIGNIFICANT CHANGE UP
SODIUM SERPL-SCNC: 134 MMOL/L — LOW (ref 135–145)
SODIUM SERPL-SCNC: 137 MMOL/L — SIGNIFICANT CHANGE UP (ref 135–145)
VANCOMYCIN FLD-MCNC: <4 UG/ML — SIGNIFICANT CHANGE UP
WBC # BLD: 16.86 K/UL — HIGH (ref 3.8–10.5)
WBC # BLD: 18.12 K/UL — HIGH (ref 3.8–10.5)
WBC # FLD AUTO: 16.86 K/UL — HIGH (ref 3.8–10.5)
WBC # FLD AUTO: 18.12 K/UL — HIGH (ref 3.8–10.5)

## 2022-11-30 PROCEDURE — 88307 TISSUE EXAM BY PATHOLOGIST: CPT | Mod: 26

## 2022-11-30 PROCEDURE — 49203: CPT

## 2022-11-30 PROCEDURE — 88311 DECALCIFY TISSUE: CPT | Mod: 26

## 2022-11-30 PROCEDURE — 88305 TISSUE EXAM BY PATHOLOGIST: CPT | Mod: 26

## 2022-11-30 PROCEDURE — 93010 ELECTROCARDIOGRAM REPORT: CPT

## 2022-11-30 PROCEDURE — 99255 IP/OBS CONSLTJ NEW/EST HI 80: CPT

## 2022-11-30 PROCEDURE — 49203: CPT | Mod: 80

## 2022-11-30 PROCEDURE — 99223 1ST HOSP IP/OBS HIGH 75: CPT

## 2022-11-30 PROCEDURE — 58150 TOTAL HYSTERECTOMY: CPT

## 2022-11-30 PROCEDURE — 58150 TOTAL HYSTERECTOMY: CPT | Mod: 80

## 2022-11-30 PROCEDURE — 99283 EMERGENCY DEPT VISIT LOW MDM: CPT

## 2022-11-30 PROCEDURE — 88342 IMHCHEM/IMCYTCHM 1ST ANTB: CPT | Mod: 26

## 2022-11-30 RX ORDER — GLUCAGON INJECTION, SOLUTION 0.5 MG/.1ML
1 INJECTION, SOLUTION SUBCUTANEOUS ONCE
Refills: 0 | Status: DISCONTINUED | OUTPATIENT
Start: 2022-11-30 | End: 2022-12-07

## 2022-11-30 RX ORDER — DEXTROSE 50 % IN WATER 50 %
25 SYRINGE (ML) INTRAVENOUS ONCE
Refills: 0 | Status: DISCONTINUED | OUTPATIENT
Start: 2022-11-30 | End: 2022-11-30

## 2022-11-30 RX ORDER — METOPROLOL TARTRATE 50 MG
25 TABLET ORAL DAILY
Refills: 0 | Status: DISCONTINUED | OUTPATIENT
Start: 2022-11-30 | End: 2022-11-30

## 2022-11-30 RX ORDER — BUDESONIDE AND FORMOTEROL FUMARATE DIHYDRATE 160; 4.5 UG/1; UG/1
2 AEROSOL RESPIRATORY (INHALATION)
Refills: 0 | Status: DISCONTINUED | OUTPATIENT
Start: 2022-11-30 | End: 2022-12-03

## 2022-11-30 RX ORDER — ONDANSETRON 8 MG/1
4 TABLET, FILM COATED ORAL ONCE
Refills: 0 | Status: COMPLETED | OUTPATIENT
Start: 2022-11-30 | End: 2022-11-30

## 2022-11-30 RX ORDER — SODIUM CHLORIDE 9 MG/ML
1000 INJECTION, SOLUTION INTRAVENOUS ONCE
Refills: 0 | Status: DISCONTINUED | OUTPATIENT
Start: 2022-11-30 | End: 2022-12-07

## 2022-11-30 RX ORDER — DEXTROSE 50 % IN WATER 50 %
15 SYRINGE (ML) INTRAVENOUS ONCE
Refills: 0 | Status: DISCONTINUED | OUTPATIENT
Start: 2022-11-30 | End: 2022-12-07

## 2022-11-30 RX ORDER — ATORVASTATIN CALCIUM 80 MG/1
10 TABLET, FILM COATED ORAL AT BEDTIME
Refills: 0 | Status: DISCONTINUED | OUTPATIENT
Start: 2022-11-30 | End: 2022-12-07

## 2022-11-30 RX ORDER — HYDROMORPHONE HYDROCHLORIDE 2 MG/ML
0.5 INJECTION INTRAMUSCULAR; INTRAVENOUS; SUBCUTANEOUS
Refills: 0 | Status: DISCONTINUED | OUTPATIENT
Start: 2022-11-30 | End: 2022-12-01

## 2022-11-30 RX ORDER — FERROUS SULFATE 325(65) MG
325 TABLET ORAL DAILY
Refills: 0 | Status: DISCONTINUED | OUTPATIENT
Start: 2022-11-30 | End: 2022-11-30

## 2022-11-30 RX ORDER — VANCOMYCIN HCL 1 G
1250 VIAL (EA) INTRAVENOUS ONCE
Refills: 0 | Status: DISCONTINUED | OUTPATIENT
Start: 2022-11-30 | End: 2022-11-30

## 2022-11-30 RX ORDER — SODIUM CHLORIDE 9 MG/ML
1000 INJECTION, SOLUTION INTRAVENOUS
Refills: 0 | Status: DISCONTINUED | OUTPATIENT
Start: 2022-11-30 | End: 2022-11-30

## 2022-11-30 RX ORDER — POTASSIUM CHLORIDE 20 MEQ
10 PACKET (EA) ORAL DAILY
Refills: 0 | Status: DISCONTINUED | OUTPATIENT
Start: 2022-11-30 | End: 2022-12-07

## 2022-11-30 RX ORDER — DEXTROSE 50 % IN WATER 50 %
12.5 SYRINGE (ML) INTRAVENOUS ONCE
Refills: 0 | Status: DISCONTINUED | OUTPATIENT
Start: 2022-11-30 | End: 2022-12-07

## 2022-11-30 RX ORDER — INSULIN LISPRO 100/ML
VIAL (ML) SUBCUTANEOUS
Refills: 0 | Status: DISCONTINUED | OUTPATIENT
Start: 2022-11-30 | End: 2022-12-07

## 2022-11-30 RX ORDER — PIPERACILLIN AND TAZOBACTAM 4; .5 G/20ML; G/20ML
3.38 INJECTION, POWDER, LYOPHILIZED, FOR SOLUTION INTRAVENOUS EVERY 8 HOURS
Refills: 0 | Status: DISCONTINUED | OUTPATIENT
Start: 2022-11-30 | End: 2022-12-07

## 2022-11-30 RX ORDER — ACETAMINOPHEN 500 MG
975 TABLET ORAL EVERY 6 HOURS
Refills: 0 | Status: DISCONTINUED | OUTPATIENT
Start: 2022-11-30 | End: 2022-11-30

## 2022-11-30 RX ORDER — PANTOPRAZOLE SODIUM 20 MG/1
20 TABLET, DELAYED RELEASE ORAL DAILY
Refills: 0 | Status: DISCONTINUED | OUTPATIENT
Start: 2022-11-30 | End: 2022-12-07

## 2022-11-30 RX ORDER — MORPHINE SULFATE 50 MG/1
4 CAPSULE, EXTENDED RELEASE ORAL EVERY 6 HOURS
Refills: 0 | Status: DISCONTINUED | OUTPATIENT
Start: 2022-11-30 | End: 2022-11-30

## 2022-11-30 RX ORDER — BUDESONIDE AND FORMOTEROL FUMARATE DIHYDRATE 160; 4.5 UG/1; UG/1
2 AEROSOL RESPIRATORY (INHALATION)
Refills: 0 | Status: DISCONTINUED | OUTPATIENT
Start: 2022-11-30 | End: 2022-11-30

## 2022-11-30 RX ORDER — AMLODIPINE BESYLATE 2.5 MG/1
10 TABLET ORAL DAILY
Refills: 0 | Status: DISCONTINUED | OUTPATIENT
Start: 2022-11-30 | End: 2022-12-07

## 2022-11-30 RX ORDER — SIMETHICONE 80 MG/1
80 TABLET, CHEWABLE ORAL EVERY 6 HOURS
Refills: 0 | Status: DISCONTINUED | OUTPATIENT
Start: 2022-11-30 | End: 2022-12-07

## 2022-11-30 RX ORDER — MESALAMINE 400 MG
1000 TABLET, DELAYED RELEASE (ENTERIC COATED) ORAL AT BEDTIME
Refills: 0 | Status: DISCONTINUED | OUTPATIENT
Start: 2022-11-30 | End: 2022-12-07

## 2022-11-30 RX ORDER — HEPARIN SODIUM 5000 [USP'U]/ML
5000 INJECTION INTRAVENOUS; SUBCUTANEOUS ONCE
Refills: 0 | Status: COMPLETED | OUTPATIENT
Start: 2022-12-01 | End: 2022-12-01

## 2022-11-30 RX ORDER — ALBUTEROL 90 UG/1
2 AEROSOL, METERED ORAL EVERY 6 HOURS
Refills: 0 | Status: DISCONTINUED | OUTPATIENT
Start: 2022-11-30 | End: 2022-11-30

## 2022-11-30 RX ORDER — TIOTROPIUM BROMIDE 18 UG/1
2 CAPSULE ORAL; RESPIRATORY (INHALATION) DAILY
Refills: 0 | Status: DISCONTINUED | OUTPATIENT
Start: 2022-11-30 | End: 2022-12-03

## 2022-11-30 RX ORDER — INSULIN LISPRO 100/ML
VIAL (ML) SUBCUTANEOUS EVERY 6 HOURS
Refills: 0 | Status: DISCONTINUED | OUTPATIENT
Start: 2022-11-30 | End: 2022-11-30

## 2022-11-30 RX ORDER — ONDANSETRON 8 MG/1
8 TABLET, FILM COATED ORAL EVERY 8 HOURS
Refills: 0 | Status: DISCONTINUED | OUTPATIENT
Start: 2022-11-30 | End: 2022-12-07

## 2022-11-30 RX ORDER — PIPERACILLIN AND TAZOBACTAM 4; .5 G/20ML; G/20ML
3.38 INJECTION, POWDER, LYOPHILIZED, FOR SOLUTION INTRAVENOUS EVERY 8 HOURS
Refills: 0 | Status: DISCONTINUED | OUTPATIENT
Start: 2022-11-30 | End: 2022-11-30

## 2022-11-30 RX ORDER — SODIUM CHLORIDE 9 MG/ML
1000 INJECTION, SOLUTION INTRAVENOUS
Refills: 0 | Status: DISCONTINUED | OUTPATIENT
Start: 2022-11-30 | End: 2022-12-07

## 2022-11-30 RX ORDER — HYDROMORPHONE HYDROCHLORIDE 2 MG/ML
30 INJECTION INTRAMUSCULAR; INTRAVENOUS; SUBCUTANEOUS
Refills: 0 | Status: DISCONTINUED | OUTPATIENT
Start: 2022-11-30 | End: 2022-12-01

## 2022-11-30 RX ORDER — METFORMIN HYDROCHLORIDE 850 MG/1
500 TABLET ORAL
Refills: 0 | Status: DISCONTINUED | OUTPATIENT
Start: 2022-11-30 | End: 2022-11-30

## 2022-11-30 RX ORDER — AMLODIPINE BESYLATE 2.5 MG/1
10 TABLET ORAL DAILY
Refills: 0 | Status: DISCONTINUED | OUTPATIENT
Start: 2022-11-30 | End: 2022-11-30

## 2022-11-30 RX ORDER — ONDANSETRON 8 MG/1
4 TABLET, FILM COATED ORAL EVERY 6 HOURS
Refills: 0 | Status: DISCONTINUED | OUTPATIENT
Start: 2022-11-30 | End: 2022-12-07

## 2022-11-30 RX ORDER — GABAPENTIN 400 MG/1
300 CAPSULE ORAL EVERY 8 HOURS
Refills: 0 | Status: COMPLETED | OUTPATIENT
Start: 2022-11-30 | End: 2022-12-03

## 2022-11-30 RX ORDER — SODIUM CHLORIDE 9 MG/ML
1000 INJECTION, SOLUTION INTRAVENOUS
Refills: 0 | Status: DISCONTINUED | OUTPATIENT
Start: 2022-11-30 | End: 2022-12-01

## 2022-11-30 RX ORDER — GLUCAGON INJECTION, SOLUTION 0.5 MG/.1ML
1 INJECTION, SOLUTION SUBCUTANEOUS ONCE
Refills: 0 | Status: DISCONTINUED | OUTPATIENT
Start: 2022-11-30 | End: 2022-11-30

## 2022-11-30 RX ORDER — OXYCODONE HYDROCHLORIDE 5 MG/1
10 TABLET ORAL
Refills: 0 | Status: DISCONTINUED | OUTPATIENT
Start: 2022-11-30 | End: 2022-12-06

## 2022-11-30 RX ORDER — ENOXAPARIN SODIUM 100 MG/ML
40 INJECTION SUBCUTANEOUS EVERY 12 HOURS
Refills: 0 | Status: DISCONTINUED | OUTPATIENT
Start: 2022-12-01 | End: 2022-12-07

## 2022-11-30 RX ORDER — PANTOPRAZOLE SODIUM 20 MG/1
20 TABLET, DELAYED RELEASE ORAL DAILY
Refills: 0 | Status: DISCONTINUED | OUTPATIENT
Start: 2022-11-30 | End: 2022-11-30

## 2022-11-30 RX ORDER — ACETAMINOPHEN 500 MG
975 TABLET ORAL EVERY 6 HOURS
Refills: 0 | Status: DISCONTINUED | OUTPATIENT
Start: 2022-11-30 | End: 2022-12-07

## 2022-11-30 RX ORDER — METFORMIN HYDROCHLORIDE 850 MG/1
1 TABLET ORAL
Qty: 0 | Refills: 0 | DISCHARGE

## 2022-11-30 RX ORDER — NALOXONE HYDROCHLORIDE 4 MG/.1ML
0.1 SPRAY NASAL
Refills: 0 | Status: DISCONTINUED | OUTPATIENT
Start: 2022-11-30 | End: 2022-12-07

## 2022-11-30 RX ORDER — POTASSIUM CHLORIDE 20 MEQ
10 PACKET (EA) ORAL DAILY
Refills: 0 | Status: DISCONTINUED | OUTPATIENT
Start: 2022-11-30 | End: 2022-11-30

## 2022-11-30 RX ORDER — DEXTROSE 50 % IN WATER 50 %
25 SYRINGE (ML) INTRAVENOUS ONCE
Refills: 0 | Status: DISCONTINUED | OUTPATIENT
Start: 2022-11-30 | End: 2022-12-07

## 2022-11-30 RX ORDER — TIOTROPIUM BROMIDE 18 UG/1
2 CAPSULE ORAL; RESPIRATORY (INHALATION) DAILY
Refills: 0 | Status: DISCONTINUED | OUTPATIENT
Start: 2022-11-30 | End: 2022-11-30

## 2022-11-30 RX ORDER — KETOROLAC TROMETHAMINE 30 MG/ML
15 SYRINGE (ML) INJECTION EVERY 6 HOURS
Refills: 0 | Status: DISCONTINUED | OUTPATIENT
Start: 2022-11-30 | End: 2022-11-30

## 2022-11-30 RX ORDER — MESALAMINE 400 MG
1000 TABLET, DELAYED RELEASE (ENTERIC COATED) ORAL AT BEDTIME
Refills: 0 | Status: DISCONTINUED | OUTPATIENT
Start: 2022-11-30 | End: 2022-11-30

## 2022-11-30 RX ORDER — SODIUM CHLORIDE 9 MG/ML
1000 INJECTION, SOLUTION INTRAVENOUS ONCE
Refills: 0 | Status: COMPLETED | OUTPATIENT
Start: 2022-11-30 | End: 2022-11-30

## 2022-11-30 RX ORDER — METHOCARBAMOL 500 MG/1
500 TABLET, FILM COATED ORAL
Refills: 0 | Status: DISCONTINUED | OUTPATIENT
Start: 2022-11-30 | End: 2022-11-30

## 2022-11-30 RX ORDER — ATORVASTATIN CALCIUM 80 MG/1
10 TABLET, FILM COATED ORAL AT BEDTIME
Refills: 0 | Status: DISCONTINUED | OUTPATIENT
Start: 2022-11-30 | End: 2022-11-30

## 2022-11-30 RX ORDER — OXYCODONE HYDROCHLORIDE 5 MG/1
5 TABLET ORAL
Refills: 0 | Status: DISCONTINUED | OUTPATIENT
Start: 2022-11-30 | End: 2022-11-30

## 2022-11-30 RX ORDER — DEXTROSE 50 % IN WATER 50 %
12.5 SYRINGE (ML) INTRAVENOUS ONCE
Refills: 0 | Status: DISCONTINUED | OUTPATIENT
Start: 2022-11-30 | End: 2022-11-30

## 2022-11-30 RX ORDER — ALBUTEROL 90 UG/1
2 AEROSOL, METERED ORAL EVERY 6 HOURS
Refills: 0 | Status: DISCONTINUED | OUTPATIENT
Start: 2022-11-30 | End: 2022-12-07

## 2022-11-30 RX ORDER — METOPROLOL TARTRATE 50 MG
25 TABLET ORAL DAILY
Refills: 0 | Status: DISCONTINUED | OUTPATIENT
Start: 2022-11-30 | End: 2022-12-07

## 2022-11-30 RX ORDER — FENTANYL CITRATE 50 UG/ML
25 INJECTION INTRAVENOUS
Refills: 0 | Status: DISCONTINUED | OUTPATIENT
Start: 2022-11-30 | End: 2022-12-01

## 2022-11-30 RX ORDER — INSULIN LISPRO 100/ML
VIAL (ML) SUBCUTANEOUS AT BEDTIME
Refills: 0 | Status: DISCONTINUED | OUTPATIENT
Start: 2022-11-30 | End: 2022-12-07

## 2022-11-30 RX ORDER — HYDROMORPHONE HYDROCHLORIDE 2 MG/ML
1 INJECTION INTRAMUSCULAR; INTRAVENOUS; SUBCUTANEOUS
Refills: 0 | Status: DISCONTINUED | OUTPATIENT
Start: 2022-11-30 | End: 2022-12-01

## 2022-11-30 RX ORDER — VALSARTAN 80 MG/1
320 TABLET ORAL DAILY
Refills: 0 | Status: DISCONTINUED | OUTPATIENT
Start: 2022-11-30 | End: 2022-11-30

## 2022-11-30 RX ORDER — DEXTROSE 50 % IN WATER 50 %
15 SYRINGE (ML) INTRAVENOUS ONCE
Refills: 0 | Status: DISCONTINUED | OUTPATIENT
Start: 2022-11-30 | End: 2022-11-30

## 2022-11-30 RX ADMIN — Medication 975 MILLIGRAM(S): at 03:19

## 2022-11-30 RX ADMIN — SODIUM CHLORIDE 185 MILLILITER(S): 9 INJECTION, SOLUTION INTRAVENOUS at 05:53

## 2022-11-30 RX ADMIN — HYDROMORPHONE HYDROCHLORIDE 30 MILLILITER(S): 2 INJECTION INTRAMUSCULAR; INTRAVENOUS; SUBCUTANEOUS at 21:25

## 2022-11-30 RX ADMIN — Medication 325 MILLIGRAM(S): at 12:30

## 2022-11-30 RX ADMIN — PIPERACILLIN AND TAZOBACTAM 25 GRAM(S): 4; .5 INJECTION, POWDER, LYOPHILIZED, FOR SOLUTION INTRAVENOUS at 02:33

## 2022-11-30 RX ADMIN — METFORMIN HYDROCHLORIDE 500 MILLIGRAM(S): 850 TABLET ORAL at 05:53

## 2022-11-30 RX ADMIN — Medication 975 MILLIGRAM(S): at 04:40

## 2022-11-30 RX ADMIN — SODIUM CHLORIDE 185 MILLILITER(S): 9 INJECTION, SOLUTION INTRAVENOUS at 14:59

## 2022-11-30 RX ADMIN — TIOTROPIUM BROMIDE 2 PUFF(S): 18 CAPSULE ORAL; RESPIRATORY (INHALATION) at 14:59

## 2022-11-30 RX ADMIN — Medication 10 MILLIEQUIVALENT(S): at 12:29

## 2022-11-30 RX ADMIN — PIPERACILLIN AND TAZOBACTAM 25 GRAM(S): 4; .5 INJECTION, POWDER, LYOPHILIZED, FOR SOLUTION INTRAVENOUS at 08:54

## 2022-11-30 RX ADMIN — Medication 975 MILLIGRAM(S): at 12:46

## 2022-11-30 RX ADMIN — ONDANSETRON 4 MILLIGRAM(S): 8 TABLET, FILM COATED ORAL at 21:33

## 2022-11-30 RX ADMIN — HYDROMORPHONE HYDROCHLORIDE 30 MILLILITER(S): 2 INJECTION INTRAMUSCULAR; INTRAVENOUS; SUBCUTANEOUS at 23:20

## 2022-11-30 RX ADMIN — MORPHINE SULFATE 4 MILLIGRAM(S): 50 CAPSULE, EXTENDED RELEASE ORAL at 02:33

## 2022-11-30 RX ADMIN — SODIUM CHLORIDE 1000 MILLILITER(S): 9 INJECTION, SOLUTION INTRAVENOUS at 21:00

## 2022-11-30 RX ADMIN — Medication 15 MILLIGRAM(S): at 05:54

## 2022-11-30 RX ADMIN — PANTOPRAZOLE SODIUM 20 MILLIGRAM(S): 20 TABLET, DELAYED RELEASE ORAL at 12:41

## 2022-11-30 RX ADMIN — Medication 15 MILLIGRAM(S): at 12:29

## 2022-11-30 NOTE — BRIEF OPERATIVE NOTE - OPERATION/FINDINGS
Two enlarged camila-umbilical lymph nodes. 2 liters of purulent ascites. 25cm multifibroid uterus with ruptured fibroid. Extensive fibrinous exudate. Grossly normal bilateral fallopian tubes and ovaries. Two enlarged camila-umbilical lymph nodes. 2 liters of purulent ascites. 25cm multi-fibroid uterus with ruptured fibroid. Extensive fibrinous exudate. Grossly normal bilateral fallopian tubes and ovaries.

## 2022-11-30 NOTE — CONSULT NOTE ADULT - ASSESSMENT
51yo G0  LMP 11/15/22 with PMH of uterine fibroids and AUB admitted for sepsis due to fever, increased RR, and increased HR now with worsening leukocytosis.     A/P:  PENDING  53yo G0  LMP 11/15/22 with PMH of uterine fibroids and AUB admitted for sepsis due to fever, increased RR, and increased HR now with worsening leukocytosis.     A/P:  - No improvement in clinical status with persistent tachycardia and hypotension.   - Lactate increasing from 2.6 -> 3.1 despite abx with Zosyn and IV fluids.  - Plan to take proceed to OR for resection of pelvic mass with JOANNE, BS, possible frozen section, possible staging, possible oopherectomy  - 4U PRBC on hold for OR  - NPO  - Type and scree ordered, keep active  - Coag panel ordered   - Anesthesia team to be made aware    d/w Dr. Whaley and Dr. Navarro 51 yo G0  LMP 11/15/22 with PMH of uterine fibroids and AUB admitted for sepsis due to fever, increased RR, and increased HR now with worsening leukocytosis.     A/P:  - No improvement in clinical status with persistent tachycardia, leukocytosis, fever, and lactic acidosis.   - Lactate increasing from 2.6 -> 3.1 despite abx with Zosyn and IV fluids.  - Plan to take proceed to OR for resection of pelvic mass with JOANNE, BS, possible frozen section, possible staging, possible oopherectomy  - 4U PRBC on hold for OR  - NPO  - Type and scree ordered, keep active  - Coag panel ordered   - Anesthesia team to be made aware    d/w Dr. Whaley and Dr. Navarro

## 2022-11-30 NOTE — PROVIDER CONTACT NOTE (OTHER) - ASSESSMENT
pt HR is 137, temp 100.2, C/o lightheadedness and shaking
pt c/o worsening SOB, decreased pain in abdominal region, and inability to sit still

## 2022-11-30 NOTE — CONSULT NOTE ADULT - ASSESSMENT
52y  Female with h/o uterine fibroids presented to ED 11/29 with 1 week of diffuse abdominal pain. Patient reports pain was intermittent for 1 week prior to presentation with flare ups on 11/17, 11/20 and then 11/29. Pain initially located in the RUQ/RLQ, and worsened and is now diffuse. Pain is described as stabbing and episodic; episodes last approx. 1 hour and are partially relieved by Tylenol/ibuprofen. Associated with episode of blood in the urine on 11/30. She reports no dysuria but reports increased urge. Patient with hx of irregular vaginal bleeding since 2015. States menstrual cycle may last 7-10 day and not consistently every month. This has been since initial fibroid diagnosis in 2015. Pt sees Dr. Tillman outpatient and was referred to GYN ONC for a hysterectomy in May 2022 for finding of 18 wk size large fibroid uterus, but was unable to followup due to insurance issues.  In the ED, patient was found to be febrile 100.6 and worked up for Sepsis due to tachycardia and hypotension. She received 1L fluid bolus x2. S/p Zosyn q8, Vancomycin x1, Zofran; Urine, blood cx collected. CT abd/pelvis obtained showing: Markedly abnormal and enlarged uterus with suspected necrosis of a 16.3 cm right fundal uterine mass, uterus 24 x18 x18 on imaging. The mass contains small foci of gas, raising concern for superimposed infection. Abnormal small to moderate amount of abdominal and pelvic ascites with anterior peritoneal soft tissue stranding. Continued on Zosyn.       Infection of Necrotic uterine mass with gas  Fever  Leukocytosis   STEPHANIE      - Blood cultures pending  - RVP/COVID 19 PCR 11/30 negative   - CT abd/pelvis obtained showing: Markedly abnormal and enlarged uterus with suspected necrosis of a 16.3 cm right fundal uterine mass, uterus 24 x18 x18 on imaging. The mass contains small foci of gas, raising concern for superimposed infection. Abnormal small to moderate amount of abdominal and pelvic ascites with anterior peritoneal soft tissue stranding.  - UA negative for UTI   - Continue Zosyn  - Start Clindamycin 900mg IV [id number]  - Start Vancomycin  - Monitor trough  - Monitor for Vancomycin toxicity   - Vancomycin required at this time pending culture results  - Patient needs Hysterectomy and fibroid removal ASAP  - GYN following   - Follow up cultures  - Trend Fever  - Trend WBC      Will Follow    d/w GYN resident and Medical consulting team

## 2022-11-30 NOTE — BRIEF OPERATIVE NOTE - SPECIMENS
1. camila-umbilical lymph nodes 2. uterus, cervix, bilateral fallopian tubes and ovaries 3. uterine culture

## 2022-11-30 NOTE — CHART NOTE - NSCHARTNOTEFT_GEN_A_CORE
[place] [facility]ology was consulted for this patient.  Patients states she follows with Dr. Bailey at [facility].    Message for Dr. Tillman via teams and Office. [place] [facility] was consulted for this patient.  Patients states she follows with Dr. Bailey at [facility].    Message for Dr. Tillman via teams and Office received call back message was received.

## 2022-11-30 NOTE — PROVIDER CONTACT NOTE (OTHER) - SITUATION
pt upon transfer to [facility] was found to have HR of 137 with temp of 100.2, C/o lightheadedness and shaking

## 2022-11-30 NOTE — PRE-OP CHECKLIST - SURGICAL CONSENT
Called patient to schedule follow up appointment for 1-2 months from now. Left clinic call back number so they can get scheduled.      Sonya HOBBS   done

## 2022-11-30 NOTE — CONSULT NOTE ADULT - SUBJECTIVE AND OBJECTIVE BOX
[facility]  INFECTIOUS DISEASES at [place] and [place]  =======================================================                               Tien Moore MD#   Camille Bateman MD*                             Antonia Mixon MD*   Yola Redmond MD*            Diplomates American Board of Internal Medicine & Infectious Diseases                # [place] Office - Appt - Tel  [phone] Fax [phone]                * [place] Office - Appt - Tel [phone] Fax [phone]                                  Hospital Consult line:  [phone]  =======================================================      [id number]  LORENA DUNCAN    CC: Patient is a 52y old  Female who presents with a chief complaint of Diffuse abdominal pain (2022 13:20)      52y  Female with h/o uterine fibroids presented to ED  with 1 week of diffuse abdominal pain. Patient reports pain was intermittent for 1 week prior to presentation with flare ups on ,  and then . Pain initially located in the RUQ/RLQ, and worsened and is now diffuse. Pain is described as stabbing and episodic; episodes last approx. 1 hour and are partially relieved by Tylenol/ibuprofen. Associated with episode of blood in the urine on . She reports no dysuria but reports increased urge. Patient with hx of irregular vaginal bleeding since . States menstrual cycle may last 7-10 day and not consistently every month. This has been since initial fibroid diagnosis in . Pt sees Dr. Tillman outpatient and was referred to GYN ONC for a hysterectomy in May 2022 for finding of 18 wk size large fibroid uterus, but was unable to followup due to insurance issues.  In the ED, patient was found to be febrile 100.6 and worked up for Sepsis due to tachycardia and hypotension. She received 1L fluid bolus x2. S/p Zosyn q8, Vancomycin x1, Zofran; Urine, blood cx collected. CT abd/pelvis obtained showing: Markedly abnormal and enlarged uterus with suspected necrosis of a 16.3 cm right fundal uterine mass, uterus 24 x18 x18 on imaging. The mass contains small foci of gas, raising concern for superimposed infection. Abnormal small to moderate amount of abdominal and pelvic ascites with anterior peritoneal soft tissue stranding. Continued on Zosyn. ID input requested.       Past Medical & Surgical Hx:  Hypertension  Diabetes  Asthma  Ulcerative colitis  Sleep apnea  Anemia  S/P dilation and curettage      Social Hx:  3 alcohol drinks per week, occasional Marijuana, denies other drug or cigarette use      FAMILY HISTORY:  Family history of diabetes mellitus (Father, Mother, Siblings)  Maternal aunts - ovarian cancer and stomach cancer  Maternal Grandmother - Breast cancer      Allergies  codeine (Nausea)  IV Contrast (Other; Pruritus; Hives)       REVIEW OF SYSTEMS:  CONSTITUTIONAL:  + Fever + chills  HEENT:  No diplopia or blurred vision.  No earache, sore throat or runny nose.  CARDIOVASCULAR:  No chest pain  RESPIRATORY:  No cough, shortness of breath  GASTROINTESTINAL:  No nausea, vomiting or diarrhea. + Abd pain   GENITOURINARY:  No dysuria, frequency or urgency. + Blood in urine  MUSCULOSKELETAL:  no joint aches, no muscle pain  SKIN:  No change in skin, hair or nails.  NEUROLOGIC:  No Headaches, seizures  PSYCHIATRIC:  No disorder of thought or mood.  ENDOCRINE:  No heat or cold intolerance  HEMATOLOGICAL:  No easy bruising or bleeding.       Physical Exam:  GEN: NAD  HEENT: normocephalic and atraumatic. EOMI. PERRL.    NECK: Supple.   LUNGS: CTA B/L.  HEART: RRR  ABDOMEN: Soft, Obese.  +BS.  Diffuse tenderness, No rebound.   : No CVA tenderness  EXTREMITIES: Without  edema.  MSK: No joint swelling  NEUROLOGIC: No Focal Deficits   PSYCHIATRIC: Appropriate affect .  SKIN: No rash      Vitals:  T(F): 98.8 (2022 04:48), Max: 100.2 (2022 03:25)  HR: 104 (2022 12:49)  BP: 92/52 (2022 12:49)  RR: 20 (2022 12:49)  SpO2: 96% (2022 07:00) (93% - 98%)  temp max in last 48H T(F): , Max: 100.5 (22 @ 11:11)      Current Antibiotics:  clindamycin IVPB      piperacillin/tazobactam IVPB.. 3.375 Gram(s) IV Intermittent every 8 hours    Other medications:  amLODIPine   Tablet 10 milliGRAM(s) Oral daily  atorvastatin 10 milliGRAM(s) Oral at bedtime  budesonide  80 MICROgram(s)/formoterol 4.5 MICROgram(s) Inhaler 2 Puff(s) Inhalation two times a day  dextrose 5%. 1000 milliLiter(s) IV Continuous <Continuous>  dextrose 5%. 1000 milliLiter(s) IV Continuous <Continuous>  dextrose 50% Injectable 25 Gram(s) IV Push once  dextrose 50% Injectable 12.5 Gram(s) IV Push once  dextrose 50% Injectable 25 Gram(s) IV Push once  ferrous    sulfate 325 milliGRAM(s) Oral daily  glucagon  Injectable 1 milliGRAM(s) IntraMuscular once  insulin lispro (ADMELOG) corrective regimen sliding scale   SubCutaneous every 6 hours  ketorolac   Injectable 15 milliGRAM(s) IV Push every 6 hours  lactated ringers. 1000 milliLiter(s) IV Continuous <Continuous>  metoprolol succinate ER 25 milliGRAM(s) Oral daily  pantoprazole    Tablet 20 milliGRAM(s) Oral daily  potassium chloride    Tablet ER 10 milliEquivalent(s) Oral daily  tiotropium 2.5 MICROgram(s) Inhaler 2 Puff(s) Inhalation daily                            9.3    16.86 )-----------( 481      ( 2022 07:50 )             30.0         137  |  102  |  26.9<H>  ----------------------------<  167<H>  4.4   |  22.0  |  1.88<H>    Ca    8.8      2022 07:50    TPro  7.1  /  Alb  2.6<L>  /  TBili  1.2  /  DBili  x   /  AST  12  /  ALT  14  /  AlkPhos  60      RECENT CULTURES:   @ 02:59    RVP  NotDete      WBC Count: 16.86 K/uL (22 @ 07:50)  WBC Count: 7.06 K/uL (22 @ 10:25)    Creatinine, Serum: 1.88 mg/dL (22 @ 07:50)  Creatinine, Serum: 0.92 mg/dL (22 @ 10:25)    SARS-CoV-2: NotDetec (22 @ 02:59)    Urinalysis Basic - ( 2022 19:02 )    Color: Yellow / Appearance: Clear / S.010 / pH: x  Gluc: x / Ketone: Trace  / Bili: Small / Urobili: 1   Blood: x / Protein: 30 mg/dL / Nitrite: Positive   Leuk Esterase: Trace / RBC: 0-2 /HPF / WBC 3-5 /HPF   Sq Epi: x / Non Sq Epi: Few / Bacteria: Few            < from: CT Abdomen and Pelvis w/ IV Cont (22 @ 17:20) >  ACC: [id number] EXAM:  CT ABDOMEN AND PELVIS IC                          PROCEDURE DATE:  2022      INTERPRETATION:  CLINICAL INFORMATION: Nonlocalized acute abdominal pain.    COMPARISON: None.    CONTRAST/COMPLICATIONS:  IV Contrast: Omnipaque 350  94 cc administered   0 cc discarded  Oral Contrast: NONE  Complications: None reported at time of study completion    PROCEDURE:  CT of the Abdomen and Pelvis was performed.  Sagittal and coronal reformats were performed.    FINDINGS:  LOWER CHEST: Within normal limits.    LIVER: Within normal limits.  BILE DUCTS: Normal caliber.  GALLBLADDER: Within normal limits.  SPLEEN: Within normal limits.  PANCREAS: Within normal limits.  ADRENALS: Within normal limits.  KIDNEYS/URETERS: Within normal limits.    BLADDER: Within normal limits.  REPRODUCTIVE ORGANS: Markedly enlarged and abnormal appearance of the   uterus with multiple myometrial masses many of which are calcified. In   the right uterine fundus, there is a partially calcified fluid density   mass measuring 10.9 x 15.7 x 16.3 cm (series 3, image 95), with   intralesional foci of gas.    BOWEL: No bowel obstruction. Appendix is not visualized. No evidence of   inflammation in the pericecal region.  PERITONEUM: Small to moderate amount of upper abdominal and pelvic   ascites. Anterior peritoneal soft tissue stranding. There is mild   thickening of the peritoneal lining.  VESSELS: Within normal limits.  RETROPERITONEUM/LYMPH NODES: Mildly prominent para-aortic retroperitoneal   lymph nodes, which are not enlarged by size criteria.  ABDOMINAL WALL: Small umbilical hernia containing fat and fluid.  BONES: Degenerative changes.      IMPRESSION:  Markedly abnormal and enlarged uterus with suspected necrosis of a 16.3   cm right fundal uterine mass. The mass contains small foci of gas,   raising concern for superimposed infection.    Abnormal small to moderate amount of abdominal and pelvic ascites with   anterior peritoneal soft tissue stranding. In the presence of thelarge   uterine mass, differential considerations does include possible uterine   malignancy such as leiomyosarcoma with peritoneal carcinomatosis.    Recommend GYN consultation. Further evaluation with MRI of the pelvis   should also be considered.    VERTEBRAL BODY ANALYSIS: No Vertebral fracture or low bone density   identified.    < end of copied text >

## 2022-11-30 NOTE — CONSULT NOTE ADULT - ASSESSMENT
52F with PMH of HTN , DM - Type 2 , STEPHANIE , obesity uterine  fibroids presents to ED with 1 week of abdominal pain accompanied by vomiting and blood in the urine. Pain started 1 week prior and was initially located in the RUQ/RLQ, but has worsened today and is now diffuse. Pain is described as stabbing and episodic; episodes last approx. 1 hour and are partially relieved by Tylenol/ibuprofen. Pt also reports first-time episode of blood in the urine today, does not think it is vaginal bleeding. She reports no dysuria but reports increased urge. Pt unsure of LMP, states she has had irregular vaginal bleeding since initial fibroid diagnosis in 2015, sees Dr. Tillman outpatient and was referred to GYN ONC for a hysterectomy.  Pt endorses multiple episodes of nonblood, nonbilious emesis today and 1 episode on Sunday along with decreased PO intake.     1. Large necrotic uterine fibroid with small foci of gas   with concern of infection ,   patient is tachycardic / hypotensive / t- max - 100.5 -   septic , cultures sent , started on IV ABX ,   ID consulted - appreciated ,   surgery recommended ASAP   CONTINUE IVF   FOLLOW UP CULTURES   TREND WBC/ FEVER    2. STEPHANIE - due to sepsis - will d/c Metformin / Valsartan,   continue ivf , avoid nephrotoxic medications , strict I and O ,   follow BMP in am     3. NIDDM - a1c- 6.6 - well controlled -   hold Metformin for now , ISSC , accu check , ADA when PO resumed     4. Hx of HTN - now hypotensive - hold on Valsartan, may continue BB postop Metoprolol 5 mg ivp Q 6hrs with parameters     5. Hx of STEPHANIE / obesity - presently on 2 L NC - consider CPAP POST OP     Patient needs emergent procedure , so absolute contraindications to proceed with planned procedure   as benefits overweight risks .   Patient planned to be transferred to Gyn Onc .   Thank you for the courtesy of this consult .   Will follow along with you .

## 2022-11-30 NOTE — CONSULT NOTE ADULT - SUBJECTIVE AND OBJECTIVE BOX
[facility], [facility]                [address] [place] [address]                                     PHONE: [phone]                                       FAX: [phone]  [email or web address]      Reason for Consult: tachycardia     HPI: Patient is an obese 53 y/o woman with intermittent asthma, hypertension, T2DM, STEPHANIE, family history of premature CAD, chronic LBBB, sinus tachycardia, CCTA with low calcium score who presents with intractable abdominal pain noted to be febrile and tachycardic.  Additionally CT abdomen and pelvis with uterine mass with necrotic core concerning for infection as well as incidental finding of small to moderate ascites.       PAST MEDICAL & SURGICAL HISTORY:  Hypertension  Diabetes  Asthma  Ulcerative colitis  Sleep apnea  Anemia  S/P dilation and curettage    Telemetry: sinus tachy    PREVIOUS DIAGNOSTIC TESTING:      EKG: sinus tachycardia, LBBB    ECHO FINDINGS: structurally normal heart     MEDICATIONS  (STANDING):  ketorolac   Injectable 15 milliGRAM(s) IV Push every 6 hours  lactated ringers. 1000 milliLiter(s) (185 mL/Hr) IV Continuous <Continuous>  metFORMIN 500 milliGRAM(s) Oral two times a day  piperacillin/tazobactam IVPB.. 3.375 Gram(s) IV Intermittent every 8 hours    MEDICATIONS  (PRN):  acetaminophen     Tablet .. 975 milliGRAM(s) Oral every 6 hours PRN Moderate Pain (4 - 6)  morphine  - Injectable 4 milliGRAM(s) IV Push every 6 hours PRN Severe Pain (7 - 10)      FAMILY HISTORY:  Family history of diabetes mellitus (Father)    SOCIAL HISTORY:  CIGARETTES: denies   ALCOHOL: denies     ROS: Negative other than as mentioned in HPI.    Vital Signs Last 24 Hrs  T(C): 37.1 (2022 04:48), Max: 38.1 (2022 11:11)  T(F): 98.8 (2022 04:48), Max: 100.5 (2022 11:11)  HR: 102 (2022 07:00) (102 - 139)  BP: 90/58 (2022 07:00) (90/58 - 124/80)  BP(mean): --  RR: 18 (2022 07:00) (18 - 20)  SpO2: 96% (2022 07:00) (93% - 98%)    Parameters below as of 2022 07:00  Patient On (Oxygen Delivery Method): nasal cannula  O2 Flow (L/min): 2      PHYSICAL EXAM:  General: Appears well developed, well nourished alert and cooperative.  HEENT: Head; normocephalic, atraumatic. sclera anicteric, MMM, no JVD, neck is supple   CARDIOVASCULAR; 1/6 DOMINIC, no rub, gallop or lift. Normal S1 and S2.  LUNGS; No rales, rhonchi or wheeze. Normal breath sounds bilaterally.  ABDOMEN ; Soft, nontender without mass or organomegaly. bowel sounds normoactive.  EXTREMITIES; No clubbing, cyanosis or edema. Distal pulses wnl. ROM normal.  SKIN; warm and dry with normal turgor.  NEURO; Alert/oriented x 3/normal motor exam.     PSYCH; normal affect.        I&O's Detail      LABS:                        9.3    16.86 )-----------( 481      ( 2022 07:50 )             30.0     11-30    137  |  102  |  26.9<H>  ----------------------------<  167<H>  4.4   |  22.0  |  1.88<H>    Ca    8.8      2022 07:50    TPro  7.1  /  Alb  2.6<L>  /  TBili  1.2  /  DBili  x   /  AST  12  /  ALT  14  /  AlkPhos  60  11-30          Urinalysis Basic - ( 2022 19:02 )    Color: Yellow / Appearance: Clear / S.010 / pH: x  Gluc: x / Ketone: Trace  / Bili: Small / Urobili: 1   Blood: x / Protein: 30 mg/dL / Nitrite: Positive   Leuk Esterase: Trace / RBC: 0-2 /HPF / WBC 3-5 /HPF   Sq Epi: x / Non Sq Epi: Few / Bacteria: Few      I&O's Summary      RADIOLOGY & ADDITIONAL STUDIES:    Ct Abd/Pelvis  <start of copied text>  IMPRESSION:  Markedly abnormal and enlarged uterus with suspected necrosis of a 16.3 cm right fundal uterine mass. The mass contains small foci of gas, raising concern for superimposed infection.    Abnormal small to moderate amount of abdominal and pelvic ascites with anterior peritoneal soft tissue stranding. In the presence of the large uterine mass, differential considerations does include possible uterine malignancy such as leiomyosarcoma with peritoneal carcinomatosis.

## 2022-11-30 NOTE — PROGRESS NOTE ADULT - ASSESSMENT
53 yo G0 LMP 11/15/22 admitted for degenerating 16.3cm right fundal fibroid with foci of gas and pelvic/abdominal ascites and concern for sepsis.  -Concern for sepsis likely secondary to degenerating fibroid: hypotensive 90s/50s, tachycardic 100-120s, afebrile on Zosyn, lactate 2.6  -Continue NPO, IVF  -Ordered home medications including maintenance inhalers. Follow-up desaturations s/p administration of Symbicort and Spiriva.  -Follow-up repeat AM labs  -Follow-up cardiology consult  -Follow-up MRI with IV contrast  -Follow-up urine culture, blood culture  -Will place gyn onc consult  -Dispo: Pending gyn onc consult. Plan for add-on hysterectomy    *Addendum @1100*  WBC uptrending to 16.86 (previously 7.06)  Cr uptrending to 1.88 (previously 0.92)  Will place medicine consult per Dr. Tillman and place ID consult per  G  Will likely transfer care to gyn onc team     Discussed with Dr. Tilmlan    *Addendum @1300*  Per medicine, metformin discontinued for concern for lactic acidosis and started on sliding scale  Per ID, no further recs for antibiotics. Continue Zosyn and plan for OR  Follow-up repeat PM labs (trending CBC, CMP, lactate)    *Addendum @1430*  Lactate uptrending to 3.1. Follow-up CBC, CMP.  Continue to be hypotensive, tachycardic  Plan for OR urgently. Gyn onc discussing with Dr. Navarro   51 yo G0 LMP 11/15/22 admitted for degenerating 16.3cm right fundal fibroid with foci of gas and pelvic/abdominal ascites and concern for sepsis.  -Concern for sepsis likely secondary to degenerating fibroid: hypotensive 90s/50s, tachycardic 100-120s, afebrile on Zosyn, lactate 2.6  -Continue NPO, IVF  -Ordered home medications including maintenance inhalers. Follow-up desaturations s/p administration of Symbicort and Spiriva.  -Follow-up repeat AM labs  -Follow-up cardiology consult  -Follow-up MRI with IV contrast  -Follow-up urine culture, blood culture  -Will place gyn onc consult  -Dispo: Pending gyn onc consult. Plan for add-on hysterectomy    *Addendum @1100*  WBC uptrending to 16.86 (previously 7.06)  Cr uptrending to 1.88 (previously 0.92)  Will place medicine consult per Dr. Tillman and place ID consult per  G  Will likely transfer care to gyn onc team     Discussed with Dr. Tillman    *Addendum @1300*  Per medicine, metformin discontinued for concern for lactic acidosis and started on sliding scale  Per ID, no further recs for antibiotics. Continue Zosyn, added clindamycin, and plan for OR  Follow-up repeat PM labs (trending CBC, CMP, lactate)    *Addendum @1430*  Lactate uptrending to 3.1. Follow-up CBC, CMP.  Continue to be hypotensive, tachycardic  Plan for OR urgently. Gyn onc discussing with Dr. Navarro

## 2022-11-30 NOTE — BRIEF OPERATIVE NOTE - NSICDXBRIEFPROCEDURE_GEN_ALL_CORE_FT
PROCEDURES:  Exploratory laparotomy 30-Nov-2022 20:21:38  Jie Marquis and BSO (total abdominal hysterectomy and bilateral salpingo-oophorectomy) 30-Nov-2022 20:22:10  Jie Marquis   PROCEDURES:  Exploratory laparotomy 30-Nov-2022 20:21:38  Jie Marquis and BSO (total abdominal hysterectomy and bilateral salpingo-oophorectomy) 30-Nov-2022 20:22:10  Jie Marquis  [place]ash[place] of abdominal cavity 30-Nov-2022 20:33:47  Jie Marquis

## 2022-11-30 NOTE — CONSULT NOTE ADULT - ATTENDING COMMENTS
Pt has a large necrotic uterine tumor extending to the liver edge with gas concerning for superimposed infection. She was started on IV antibiotics and was fluid resuscitated over the last 24 hours. Her clinical picture is stable overall as patient reports clinically feeling better in the last 24 hours, however, given large necrotic tumor her sepsis will likely not resolve and urgent hysterectomy is recommended. Worsening of lactic acidosis and renal function. Given these findings recommendation to proceed with urgent surgery. We discussed risk of uterine malignancy with patient and staging biopsies may be indicated. Intra-operative risks were discussed including risk of bowel resection, bladder or ureteral injury, need for transfusion. Anesthesia will start an Arterial line for intra-operative management. Patient at high risk of perioperative complications including DVT, post-operative infection, wound separation and dehiscence, and need for transfusion. ICU was consulted for post-operative management. The patient was likely significantly dehydrated and with 4 L of IV fluids finally shows improvement of BP. Pt has a large necrotic uterine tumor extending to the liver edge with gas concerning for superimposed infection. She was started on IV antibiotics and was fluid resuscitated over the last 24 hours. Her clinical picture is stable overall as patient reports clinically feeling better in the last 24 hours, however, given large necrotic tumor her sepsis will likely not resolve and urgent hysterectomy is recommended. Worsening of lactic acidosis and renal function. Given these findings recommendation to proceed with urgent surgery. We discussed risk of uterine malignancy with patient and staging biopsies may be indicated. Intra-operative risks were discussed including risk of bowel resection, bladder or ureteral injury, need for transfusion. Anesthesia will start an Arterial line for intra-operative management. Patient at high risk of perioperative complications including DVT, post-operative infection, wound separation and dehiscence, and need for transfusion. ICU was consulted for post-operative management. The patient was likely significantly dehydrated and with 4 L of IV fluids finally shows improvement of BP.     Bharti Navarro MD

## 2022-11-30 NOTE — PROGRESS NOTE ADULT - SUBJECTIVE AND OBJECTIVE BOX
51 yo G0 with history of multifibroid uterus with largest fibroid 14cm outpatient (5/2021) admitted for degenerating 16.3cm right fundal fibroid with concern for possible malignancy based on ascites seen on CT with IV contrast and sepsis based on hypotension, tachycardia, mild fever now resolved currently on Zosyn, s/p 1 dose vancomycin now discontinued. She also has a history of T2DM, HTN, chronic LBBB, ulcerative colitis, asthma, anemia. She was seen at bedside during morning rounds and states that she continues to have diffuse abdominal pain, worse in the lower quadrants. She last ate at 11/29 2200. 53 yo G0 with history of multifibroid uterus with largest fibroid 14cm outpatient (5/2021) admitted for degenerating 16.3cm right fundal fibroid with concern for possible malignancy based on ascites seen on CT with IV contrast and sepsis based on hypotension, tachycardia, mild fever now resolved currently on Zosyn, s/p 1 dose vancomycin now discontinued. She also has a history of T2DM, HTN, chronic LBBB, ulcerative colitis, asthma, anemia. She was seen at bedside during morning rounds and states that she continues to have diffuse abdominal pain, worse in the lower quadrants. She last ate at 11/29 2200. She has also had periods of desaturations to mid-high 80s% at rest and with lying flat. She denies chest pain, shortness of breath, wheezing.    ICU Vital Signs Last 24 Hrs  T(C): 37.1 (30 Nov 2022 04:48), Max: 37.9 (30 Nov 2022 03:25)  T(F): 98.8 (30 Nov 2022 04:48), Max: 100.2 (30 Nov 2022 03:25)  HR: 104 (30 Nov 2022 12:49) (102 - 136)  BP: 92/52 (30 Nov 2022 12:49) (90/58 - 117/76)  RR: 20 (30 Nov 2022 12:49) (18 - 20)  SpO2: 96% (30 Nov 2022 07:00) (93% - 98%)    O2 Parameters below as of 30 Nov 2022 07:00  Patient On (Oxygen Delivery Method): nasal cannula  O2 Flow (L/min): 2    Gen: AAOx3  Abd: soft, distended, mildly tender to palpation in lower quadrants, no masses palpable to light palpation, normal tympany to percussion, no abdominal fluid shift appreciated with manipulation  Ext: no tenderness to calf palpation                          9.3    16.86 )-----------( 481      ( 30 Nov 2022 07:50 )             30.0     11-30    137  |  102  |  26.9<H>  ----------------------------<  167<H>  4.4   |  22.0  |  1.88<H>    Ca    8.8      30 Nov 2022 07:50    TPro  7.1  /  Alb  2.6<L>  /  TBili  1.2  /  DBili  x   /  AST  12  /  ALT  14  /  AlkPhos  60  11-30

## 2022-11-30 NOTE — PROVIDER CONTACT NOTE (OTHER) - BACKGROUND
Pt presents to the hospital for worsening pain in the lower abdominal area found to have uterine mass on ct scan.
pt presents to the hospital for uterine mass and pain x1 week.

## 2022-11-30 NOTE — CONSULT NOTE ADULT - SUBJECTIVE AND OBJECTIVE BOX
GYNECOLOGIC ONCOLOGY CONSULT NOTE    53yo G0  LMP 11/15/22 with PMH of uterine fibroids presented to ED yesterday for with 1 week of diffuse abdominal pain. Patient stated pain was intermittent for last week with flare ups on ,  and then . Pain initially located in the RUQ/RLQ, but has worsened and is now diffuse. Pain is described as stabbing and episodic; episodes last approx. 1 hour and are partially relieved by Tylenol/ibuprofen. Pt also reports first-time episode of blood in the urine today, does not think it is vaginal bleeding. She reports no dysuria but reports increased urge. Patient with hx of irregular vaginal bleeding as of . States menstrual cycle may last 7-10 day and not consistently every month. This has been since initial fibroid diagnosis in . Pt sees Dr. Tillman outpatient and was referred to GYN ONC for a hysterectomy in May 2022 for finding of 18 wk size large fibroid uterus, but was unable to followup due to insurance issues.    Pt also endorses multiple episodes of nonblood, nonbilious emesis yesterday and 1 episode on  but attributes it to taking pain medication with decreased PO intake.     In the ED, patient was found to be febrile 100.6 and worked up for Sepsis due to tachycardia and low BP. She received 1L fluid bolus x2. S/p Zosyn q8, Vancomycin x1, Zofran; Urine, blood cx collected. Lactated noted to be elevated at that time.   CT abd/pelvis obtained  Denies fevers at home but 100.6F recorded in ED. Denies chills, headaches, constipation/diarrhea, hematochezia or melena.       OB Hx: G0  Gyn Hx: uterine fibroids, dx . Denies other gyn hx.  PMH: uterine fibroids, T2DM, HTN, asthma, UC, anemia  PSH: D&Cx2   Medications: 500mg metformin, Valsartan, Amlodipine, Metoprolol, Flonase, Atorvastatin, iron, potassium  Allergies: codeine, iodine, environmental   Fam Hx: HTN, diabetes   (2022 23:56)      OB/GYN HISTORY:     Last Mammogram:  Last Pap Smear:  Last Colonoscopy:    Past Medical History:   Hypertension    Diabetes    Asthma    Ulcerative colitis    Sleep apnea    Anemia        Surgical History:    No significant past surgical history    S/P dilation and curettage        Home Meds:       Allergies:  codeine (Nausea)  IV Contrast (Other; Pruritus; Hives)      Family History  FAMILY HISTORY:  Family history of diabetes mellitus (Father)        Social History:     REVIEW OF SYSTEMS:    CONSTITUTIONAL: No fever, weight loss, or fatigue  EYES: No eye pain, visual disturbances, or discharge  ENMT:  No difficulty hearing, tinnitus, vertigo; No sinus or throat pain  NECK: No pain or stiffness  BREASTS: No pain, masses, or nipple discharge  RESPIRATORY: No cough, wheezing, chills or hemoptysis; No shortness of breath  CARDIOVASCULAR: No chest pain, palpitations, dizziness, or leg swelling  GASTROINTESTINAL: No abdominal or epigastric pain. No nausea, vomiting, or hematemesis; No diarrhea or constipation. No melena or hematochezia.  GENITOURINARY: No dysuria, frequency, hematuria, or incontinence  NEUROLOGICAL: No headaches, memory loss, loss of strength, numbness, or tremors  SKIN: No itching, burning, rashes, or lesions   LYMPH NODES: No enlarged glands  ENDOCRINE: No heat or cold intolerance; No hair loss  MUSCULOSKELETAL: No joint pain or swelling; No muscle, back, or extremity pain  PSYCHIATRIC: No depression, anxiety, mood swings, or difficulty sleeping  HEME/LYMPH: No easy bruising, or bleeding gums  ALLERY AND IMMUNOLOGIC: No hives or eczema    OBJECTIVE FINDINGS:  Vital Signs Last 24 Hrs  T(C): 37.1 (2022 04:48), Max: 37.9 (2022 03:25)  T(F): 98.8 (2022 04:48), Max: 100.2 (2022 03:25)  HR: 104 (2022 12:49) (102 - 136)  BP: 92/52 (2022 12:49) (90/58 - 117/76)  BP(mean): --  RR: 20 (2022 12:49) (18 - 20)  SpO2: 96% (2022 07:00) (93% - 98%)    Parameters below as of 2022 07:00  Patient On (Oxygen Delivery Method): nasal cannula  O2 Flow (L/min): 2      PHYSICAL EXAM:  GENERAL: NAD, well-developed  HEAD:  Atraumatic, Normocephalic  EYES: EOMI, PERRLA, conjunctiva and sclera clear  ENMT: No tonsillar erythema, exudates, or enlargement; Moist mucous membranes, Good dentition, No lesions  NECK: Supple, No JVD, Normal thyroid  NERVOUS SYSTEM:  Alert & Oriented X3, Good concentration; Motor Strength 5/5 B/L upper and lower extremities; DTRs 2+ intact and symmetric  CHEST/LUNG: Clear to percussion bilaterally; No rales, rhonchi, wheezing, or rubs  HEART: Regular rate and rhythm; No murmurs, rubs, or gallops  ABDOMEN: Soft, Nontender, Nondistended; Bowel sounds present, No rebound, No guarding  EXTREMITIES:  2+ Peripheral Pulses, No clubbing, cyanosis, or edema, Guillermo's sign negative  LYMPH: No lymphadenopathy noted  SKIN: No rashes or lesions  PELVIC:   RECTAL:    LABS:                        9.3    16.86 )-----------( 481      ( 2022 07:50 )             30.0     11-30    137  |  102  |  26.9<H>  ----------------------------<  167<H>  4.4   |  22.0  |  1.88<H>    Ca    8.8      2022 07:50    TPro  7.1  /  Alb  2.6<L>  /  TBili  1.2  /  DBili  x   /  AST  12  /  ALT  14  /  AlkPhos  60  11-      Urinalysis Basic - ( 2022 19:02 )    < from: CT Abdomen and Pelvis w/ IV Cont (22 @ 17:20) >  ACC: [id number] EXAM:  CT ABDOMEN AND PELVIS IC                          PROCEDURE DATE:  2022          INTERPRETATION:  CLINICAL INFORMATION: Nonlocalized acute abdominal pain.    COMPARISON: None.    CONTRAST/COMPLICATIONS:  IV Contrast: Omnipaque 350  94 cc administered   0 cc discarded  Oral Contrast: NONE  Complications: None reported at time of study completion    PROCEDURE:  CT of the Abdomen and Pelvis was performed.  Sagittal and coronal reformats were performed.    FINDINGS:  LOWER CHEST: Within normal limits.    LIVER: Within normal limits.  BILE DUCTS: Normal caliber.  GALLBLADDER: Within normal limits.  SPLEEN: Within normal limits.  PANCREAS: Within normal limits.  ADRENALS: Within normal limits.  KIDNEYS/URETERS: Within normal limits.    BLADDER: Within normal limits.  REPRODUCTIVE ORGANS: Markedly enlarged and abnormal appearance of the   uterus with multiple myometrial masses many of which are calcified. In   the right uterine fundus, there is a partially calcified fluid density   mass measuring 10.9 x 15.7 x 16.3 cm (series 3, image 95), with   intralesional foci of gas.    BOWEL: No bowel obstruction. Appendix is not visualized. No evidence of   inflammation in the pericecal region.  PERITONEUM: Small to moderate amount of upper abdominal and pelvic   ascites. Anterior peritoneal soft tissue stranding. There is mild   thickening of the peritoneal lining.  VESSELS: Within normal limits.  RETROPERITONEUM/LYMPH NODES: Mildly prominent para-aortic retroperitoneal   lymph nodes, which are not enlarged by size criteria.  ABDOMINAL WALL: Small umbilical hernia containing fat and fluid.  BONES: Degenerative changes.      IMPRESSION:  Markedly abnormal and enlarged uterus with suspected necrosis of a 16.3   cm right fundal uterine mass. The mass contains small foci of gas,   raising concern for superimposed infection.    Abnormal small to moderate amount of abdominal and pelvic ascites with   anterior peritoneal soft tissue stranding. In the presence of thelarge   uterine mass, differential considerations does include possible uterine   malignancy such as leiomyosarcoma with peritoneal carcinomatosis.    Recommend GYN consultation. Further evaluation with MRI of the pelvis   should also be considered.    VERTEBRAL BODY ANALYSIS: No Vertebral fracture or low bone density   identified.        --- End of Report ---            TOÑO MELCHOR MD; Attending Radiologist  This document has been electronically signed. 2022  5:46PM    < end of copied text >  Color: Yellow / Appearance: Clear / S.010 / pH: x  Gluc: x / Ketone: Trace  / Bili: Small / Urobili: 1   Blood: x / Protein: 30 mg/dL / Nitrite: Positive   Leuk Esterase: Trace / RBC: 0-2 /HPF / WBC 3-5 /HPF   Sq Epi: x / Non Sq Epi: Few / Bacteria: Few        Hospital Medications:   MEDICATIONS  (STANDING):  amLODIPine   Tablet 10 milliGRAM(s) Oral daily  atorvastatin 10 milliGRAM(s) Oral at bedtime  budesonide  80 MICROgram(s)/formoterol 4.5 MICROgram(s) Inhaler 2 Puff(s) Inhalation two times a day  ferrous    sulfate 325 milliGRAM(s) Oral daily  ketorolac   Injectable 15 milliGRAM(s) IV Push every 6 hours  lactated ringers. 1000 milliLiter(s) (185 mL/Hr) IV Continuous <Continuous>  metFORMIN 500 milliGRAM(s) Oral two times a day  metoprolol succinate ER 25 milliGRAM(s) Oral daily  pantoprazole    Tablet 20 milliGRAM(s) Oral daily  piperacillin/tazobactam IVPB.. 3.375 Gram(s) IV Intermittent every 8 hours  potassium chloride    Tablet ER 10 milliEquivalent(s) Oral daily  tiotropium 2.5 MICROgram(s) Inhaler 2 Puff(s) Inhalation daily  valsartan 320 milliGRAM(s) Oral daily    MEDICATIONS  (PRN):  acetaminophen     Tablet .. 975 milliGRAM(s) Oral every 6 hours PRN Moderate Pain (4 - 6)  albuterol    90 MICROgram(s) HFA Inhaler 2 Puff(s) Inhalation every 6 hours PRN Shortness of Breath and/or Wheezing  mesalamine Suppository 1000 milliGRAM(s) Rectal at bedtime PRN ulcerative colitis  methocarbamol 500 milliGRAM(s) Oral two times a day PRN Muscle Spasm  morphine  - Injectable 4 milliGRAM(s) IV Push every 6 hours PRN Severe Pain (7 - 10)   GYNECOLOGIC ONCOLOGY CONSULT NOTE    51yo G0  LMP 11/15/22 with PMH of uterine fibroids presented to ED yesterday for with 1 week of diffuse abdominal pain. Patient stated pain was intermittent for last week with flare ups on ,  and then . Pain initially located in the RUQ/RLQ, but has worsened and is now diffuse. Pain is described as stabbing and episodic; episodes last approx. 1 hour and are partially relieved by Tylenol/ibuprofen. Pt also reports first-time episode of blood in the urine today, does not think it is vaginal bleeding. She reports no dysuria but reports increased urge. Patient with hx of irregular vaginal bleeding as of . States menstrual cycle may last 7-10 day and not consistently every month. This has been since initial fibroid diagnosis in . Pt sees Dr. Tillman outpatient and was referred to GYN ONC for a hysterectomy in May 2022 for finding of 18 wk size large fibroid uterus, but was unable to followup due to insurance issues.    Pt also endorses multiple episodes of nonblood, nonbilious emesis yesterday and 1 episode on  but attributes it to taking pain medication with decreased PO intake.     In the ED, patient was found to be febrile 100.6 and worked up for Sepsis due to tachycardia and low BP. She received 1L fluid bolus x2. S/p Zosyn q8, Vancomycin x1, Zofran; Urine, blood cx collected. Lactated noted to be elevated at that time.     CT abd/pelvis obtained showing: Markedly abnormal and enlarged uterus with suspected necrosis of a 16.3 cm right fundal uterine mass, uterus 24 x18 x18 on imaging. The mass contains small foci of gas, raising concern for superimposed infection. Abnormal small to moderate amount of abdominal and pelvic ascites with anterior peritoneal soft tissue stranding. GYN ONC consulted due to the presence of enlarging large uterine mass, differential considerations include possible uterine malignancy such as leiomyosarcoma with peritoneal carcinomatosis.    Patient denies fevers, chills, CP, N/V. Endorses orthopnea. Patient denies constipation, dysuria, hematochezia. Endorses unintentional 15 lb weight loss in 2022.      Last PAP:  negative as per patient  EMB in  - showing secretory endometrium  Last mammogram -  normal as per patient   Colonoscopy scheduled for 22, last one normal per patient few years ago   No DEXA scan.     OB Hx: G0  Gyn Hx: uterine fibroids, dx 2015. Denies cysts, abnormal PAP, STI. Patient has never been on birth control; Patient with history of menorrhagia and dysmenorrhea since 2015 with irregular menstrual cycles  PMH: uterine fibroids, T2DM, HTN, asthma, UC, anemia  PSH: D&Cx2 for AUB  Medications: 500mg metformin, Valsartan, Amlodipine, Metoprolol, Trilogy QD, Albuterol inhaler prn, Asacol prn for UC, Atorvastatin, iron, potassium, Magnesium, Vitamin  Allergies: codeine, iodine, environmental   Fam Hx: Maternal aunts - ovarian cancer and stomach cancer, Maternal Grandmother - Breast cancer   Social Hx: 3 alcohol drinks per week, occasional Marijuana, denies other drug or cigarette use, Patient uses walker at home for ambulation assistance         OBJECTIVE FINDINGS:  Vital Signs Last 24 Hrs  T(C): 37.1 (2022 04:48), Max: 37.9 (2022 03:25)  T(F): 98.8 (2022 04:48), Max: 100.2 (2022 03:25)  HR: 104 (2022 12:49) (102 - 136)  BP: 92/52 (2022 12:49) (90/58 - 117/76)  BP(mean): --  RR: 20 (2022 12:49) (18 - 20)  SpO2: 96% (2022 07:00) (93% - 98%)    Parameters below as of 2022 07:00  Patient On (Oxygen Delivery Method): nasal cannula  O2 Flow (L/min): 2      PHYSICAL EXAM:  GENERAL: NAD, well-developed  CHEST/LUNG: Clear to percussion bilaterally  HEART: Increased rate and rhythm; No murmurs, rubs, or gallops  ABDOMEN: Soft, Tender to palpation diffusely RUQ/Epigastric quadrant > B/L lower quadrants, Nondistended; No rebound, No guarding  EXTREMITIES:  2+ Peripheral Pulses, No clubbing, cyanosis, or edema, Guillermo's sign negative  SKIN: No rashes or lesions  PELVIC: Patient declined as unable to lie flat on bed. External genitalia exam showed no vaginal bleeding. Fundus palpated at umbilicus.       LABS:                        9.3    16.86 )-----------( 481      ( 2022 07:50 )             30.0         137  |  102  |  26.9<H>  ----------------------------<  167<H>  4.4   |  22.0  |  1.88<H>    Ca    8.8      2022 07:50    TPro  7.1  /  Alb  2.6<L>  /  TBili  1.2  /  DBili  x   /  AST  12  /  ALT  14  /  AlkPhos  60        Urinalysis Basic - ( 2022 19:02 )    < from: CT Abdomen and Pelvis w/ IV Cont (22 @ 17:20) >  ACC: [id number] EXAM:  CT ABDOMEN AND PELVIS IC                          PROCEDURE DATE:  2022          INTERPRETATION:  CLINICAL INFORMATION: Nonlocalized acute abdominal pain.    COMPARISON: None.    CONTRAST/COMPLICATIONS:  IV Contrast: Omnipaque 350  94 cc administered   0 cc discarded  Oral Contrast: NONE  Complications: None reported at time of study completion    PROCEDURE:  CT of the Abdomen and Pelvis was performed.  Sagittal and coronal reformats were performed.    FINDINGS:  LOWER CHEST: Within normal limits.    LIVER: Within normal limits.  BILE DUCTS: Normal caliber.  GALLBLADDER: Within normal limits.  SPLEEN: Within normal limits.  PANCREAS: Within normal limits.  ADRENALS: Within normal limits.  KIDNEYS/URETERS: Within normal limits.    BLADDER: Within normal limits.  REPRODUCTIVE ORGANS: Markedly enlarged and abnormal appearance of the   uterus with multiple myometrial masses many of which are calcified. In   the right uterine fundus, there is a partially calcified fluid density   mass measuring 10.9 x 15.7 x 16.3 cm (series 3, image 95), with   intralesional foci of gas.    BOWEL: No bowel obstruction. Appendix is not visualized. No evidence of   inflammation in the pericecal region.  PERITONEUM: Small to moderate amount of upper abdominal and pelvic   ascites. Anterior peritoneal soft tissue stranding. There is mild   thickening of the peritoneal lining.  VESSELS: Within normal limits.  RETROPERITONEUM/LYMPH NODES: Mildly prominent para-aortic retroperitoneal   lymph nodes, which are not enlarged by size criteria.  ABDOMINAL WALL: Small umbilical hernia containing fat and fluid.  BONES: Degenerative changes.      IMPRESSION:  Markedly abnormal and enlarged uterus with suspected necrosis of a 16.3   cm right fundal uterine mass. The mass contains small foci of gas,   raising concern for superimposed infection.    Abnormal small to moderate amount of abdominal and pelvic ascites with   anterior peritoneal soft tissue stranding. In the presence of thelarge   uterine mass, differential considerations does include possible uterine   malignancy such as leiomyosarcoma with peritoneal carcinomatosis.    Recommend GYN consultation. Further evaluation with MRI of the pelvis   should also be considered.    VERTEBRAL BODY ANALYSIS: No Vertebral fracture or low bone density   identified.        --- End of Report ---            TOÑO MELCHOR MD; Attending Radiologist  This document has been electronically signed. 2022  5:46PM    < end of copied text >  Color: Yellow / Appearance: Clear / S.010 / pH: x  Gluc: x / Ketone: Trace  / Bili: Small / Urobili: 1   Blood: x / Protein: 30 mg/dL / Nitrite: Positive   Leuk Esterase: Trace / RBC: 0-2 /HPF / WBC 3-5 /HPF   Sq Epi: x / Non Sq Epi: Few / Bacteria: Few        Hospital Medications:   MEDICATIONS  (STANDING):  amLODIPine   Tablet 10 milliGRAM(s) Oral daily  atorvastatin 10 milliGRAM(s) Oral at bedtime  budesonide  80 MICROgram(s)/formoterol 4.5 MICROgram(s) Inhaler 2 Puff(s) Inhalation two times a day  ferrous    sulfate 325 milliGRAM(s) Oral daily  ketorolac   Injectable 15 milliGRAM(s) IV Push every 6 hours  lactated ringers. 1000 milliLiter(s) (185 mL/Hr) IV Continuous <Continuous>  metFORMIN 500 milliGRAM(s) Oral two times a day  metoprolol succinate ER 25 milliGRAM(s) Oral daily  pantoprazole    Tablet 20 milliGRAM(s) Oral daily  piperacillin/tazobactam IVPB.. 3.375 Gram(s) IV Intermittent every 8 hours  potassium chloride    Tablet ER 10 milliEquivalent(s) Oral daily  tiotropium 2.5 MICROgram(s) Inhaler 2 Puff(s) Inhalation daily  valsartan 320 milliGRAM(s) Oral daily    MEDICATIONS  (PRN):  acetaminophen     Tablet .. 975 milliGRAM(s) Oral every 6 hours PRN Moderate Pain (4 - 6)  albuterol    90 MICROgram(s) HFA Inhaler 2 Puff(s) Inhalation every 6 hours PRN Shortness of Breath and/or Wheezing  mesalamine Suppository 1000 milliGRAM(s) Rectal at bedtime PRN ulcerative colitis  methocarbamol 500 milliGRAM(s) Oral two times a day PRN Muscle Spasm  morphine  - Injectable 4 milliGRAM(s) IV Push every 6 hours PRN Severe Pain (7 - 10)   GYNECOLOGIC ONCOLOGY CONSULT NOTE    53yo G0  LMP 11/15/22 with PMH of uterine fibroids presented to ED yesterday for with 1 week of diffuse abdominal pain. Patient stated pain was intermittent for last week with flare ups on ,  and then . Pain initially located in the RUQ/RLQ, but has worsened and is now diffuse. Pain is described as stabbing and episodic; episodes last approx. 1 hour and are partially relieved by Tylenol/ibuprofen. Pt also reports first-time episode of blood in the urine today, does not think it is vaginal bleeding. She reports no dysuria but reports increased urge. Patient with hx of irregular vaginal bleeding as of . States menstrual cycle may last 7-10 day and not consistently every month. This has been since initial fibroid diagnosis in . Pt sees Dr. Tillman outpatient and was referred to GYN ONC for a hysterectomy in May 2022 for finding of 18 wk size large fibroid uterus, but was unable to followup due to insurance issues.    Pt also endorses multiple episodes of nonblood, nonbilious emesis yesterday and 1 episode on  but attributes it to taking pain medication with decreased PO intake.     In the ED, patient was found to be febrile 100.6 and worked up for Sepsis due to tachycardia and hypotension. She received 1L fluid bolus x2. S/p Zosyn q8, Vancomycin x1, Zofran; Urine, blood cx collected. Lactated noted to be elevated at that time.     CT abd/pelvis obtained showing: Markedly abnormal and enlarged uterus with suspected necrosis of a 16.3 cm right fundal uterine mass, uterus 24 x18 x18 on imaging. The mass contains small foci of gas, raising concern for superimposed infection. Abnormal small to moderate amount of abdominal and pelvic ascites with anterior peritoneal soft tissue stranding. GYN ONC consulted due to the presence of enlarging large uterine mass, differential considerations include possible uterine malignancy such as leiomyosarcoma with peritoneal carcinomatosis.    Patient denies fevers, chills, CP, N/V. Endorses orthopnea. Patient denies constipation, dysuria, hematochezia. Endorses unintentional 15 lb weight loss in 2022.      Last PAP:  negative as per patient  EMB in  - showing secretory endometrium  Last mammogram -  normal as per patient   Colonoscopy scheduled for 22, last one normal per patient few years ago   No DEXA scan.     OB Hx: G0  Gyn Hx: uterine fibroids, dx . Denies cysts, abnormal PAP, STI. Patient has never been on birth control; Patient with history of menorrhagia and dysmenorrhea since 2015 with irregular menstrual cycles  PMH: uterine fibroids, T2DM, HTN, asthma, UC, anemia  PSH: D&Cx2 for AUB  Medications: 500mg metformin, Valsartan, Amlodipine, Metoprolol, Trilogy QD, Albuterol inhaler prn, Asacol prn for UC, Atorvastatin, iron, potassium, Magnesium, Vitamin  Allergies: codeine, iodine, environmental   Fam Hx: Maternal aunts - ovarian cancer and stomach cancer, Maternal Grandmother - Breast cancer   Social Hx: 3 alcohol drinks per week, occasional Marijuana, denies other drug or cigarette use, Patient uses walker at home for ambulation assistance         OBJECTIVE FINDINGS:  Vital Signs Last 24 Hrs  T(C): 37.1 (2022 04:48), Max: 37.9 (2022 03:25)  T(F): 98.8 (2022 04:48), Max: 100.2 (2022 03:25)  HR: 104 (2022 12:49) (102 - 136)  BP: 92/52 (2022 12:49) (90/58 - 117/76)  BP(mean): --  RR: 20 (2022 12:49) (18 - 20)  SpO2: 96% (2022 07:00) (93% - 98%)    Parameters below as of 2022 07:00  Patient On (Oxygen Delivery Method): nasal cannula  O2 Flow (L/min): 2      PHYSICAL EXAM:  GENERAL: NAD, well-developed  CHEST/LUNG: Clear to percussion bilaterally  HEART: Increased rate and rhythm; No murmurs, rubs, or gallops  ABDOMEN: Soft, Tender to palpation diffusely RUQ/Epigastric quadrant > B/L lower quadrants, Nondistended; No rebound, No guarding  EXTREMITIES:  2+ Peripheral Pulses, No clubbing, cyanosis, or edema, Guillermo's sign negative  SKIN: No rashes or lesions  PELVIC: Patient declined as unable to lie flat on bed. External genitalia exam showed no vaginal bleeding. Fundus palpated at umbilicus.       LABS:                        9.3    16.86 )-----------( 481      ( 2022 07:50 )             30.0     11    137  |  102  |  26.9<H>  ----------------------------<  167<H>  4.4   |  22.0  |  1.88<H>    Ca    8.8      2022 07:50    TPro  7.1  /  Alb  2.6<L>  /  TBili  1.2  /  DBili  x   /  AST  12  /  ALT  14  /  AlkPhos  60        Urinalysis Basic - ( 2022 19:02 )    < from: CT Abdomen and Pelvis w/ IV Cont (22 @ 17:20) >  ACC: [id number] EXAM:  CT ABDOMEN AND PELVIS IC                          PROCEDURE DATE:  2022          INTERPRETATION:  CLINICAL INFORMATION: Nonlocalized acute abdominal pain.    COMPARISON: None.    CONTRAST/COMPLICATIONS:  IV Contrast: Omnipaque 350  94 cc administered   0 cc discarded  Oral Contrast: NONE  Complications: None reported at time of study completion    PROCEDURE:  CT of the Abdomen and Pelvis was performed.  Sagittal and coronal reformats were performed.    FINDINGS:  LOWER CHEST: Within normal limits.    LIVER: Within normal limits.  BILE DUCTS: Normal caliber.  GALLBLADDER: Within normal limits.  SPLEEN: Within normal limits.  PANCREAS: Within normal limits.  ADRENALS: Within normal limits.  KIDNEYS/URETERS: Within normal limits.    BLADDER: Within normal limits.  REPRODUCTIVE ORGANS: Markedly enlarged and abnormal appearance of the   uterus with multiple myometrial masses many of which are calcified. In   the right uterine fundus, there is a partially calcified fluid density   mass measuring 10.9 x 15.7 x 16.3 cm (series 3, image 95), with   intralesional foci of gas.    BOWEL: No bowel obstruction. Appendix is not visualized. No evidence of   inflammation in the pericecal region.  PERITONEUM: Small to moderate amount of upper abdominal and pelvic   ascites. Anterior peritoneal soft tissue stranding. There is mild   thickening of the peritoneal lining.  VESSELS: Within normal limits.  RETROPERITONEUM/LYMPH NODES: Mildly prominent para-aortic retroperitoneal   lymph nodes, which are not enlarged by size criteria.  ABDOMINAL WALL: Small umbilical hernia containing fat and fluid.  BONES: Degenerative changes.      IMPRESSION:  Markedly abnormal and enlarged uterus with suspected necrosis of a 16.3   cm right fundal uterine mass. The mass contains small foci of gas,   raising concern for superimposed infection.    Abnormal small to moderate amount of abdominal and pelvic ascites with   anterior peritoneal soft tissue stranding. In the presence of thelarge   uterine mass, differential considerations does include possible uterine   malignancy such as leiomyosarcoma with peritoneal carcinomatosis.    Recommend GYN consultation. Further evaluation with MRI of the pelvis   should also be considered.    VERTEBRAL BODY ANALYSIS: No Vertebral fracture or low bone density   identified.        --- End of Report ---            TOÑO MELCHOR MD; Attending Radiologist  This document has been electronically signed. 2022  5:46PM    < end of copied text >  Color: Yellow / Appearance: Clear / S.010 / pH: x  Gluc: x / Ketone: Trace  / Bili: Small / Urobili: 1   Blood: x / Protein: 30 mg/dL / Nitrite: Positive   Leuk Esterase: Trace / RBC: 0-2 /HPF / WBC 3-5 /HPF   Sq Epi: x / Non Sq Epi: Few / Bacteria: Few        Hospital Medications:   MEDICATIONS  (STANDING):  amLODIPine   Tablet 10 milliGRAM(s) Oral daily  atorvastatin 10 milliGRAM(s) Oral at bedtime  budesonide  80 MICROgram(s)/formoterol 4.5 MICROgram(s) Inhaler 2 Puff(s) Inhalation two times a day  ferrous    sulfate 325 milliGRAM(s) Oral daily  ketorolac   Injectable 15 milliGRAM(s) IV Push every 6 hours  lactated ringers. 1000 milliLiter(s) (185 mL/Hr) IV Continuous <Continuous>  metFORMIN 500 milliGRAM(s) Oral two times a day  metoprolol succinate ER 25 milliGRAM(s) Oral daily  pantoprazole    Tablet 20 milliGRAM(s) Oral daily  piperacillin/tazobactam IVPB.. 3.375 Gram(s) IV Intermittent every 8 hours  potassium chloride    Tablet ER 10 milliEquivalent(s) Oral daily  tiotropium 2.5 MICROgram(s) Inhaler 2 Puff(s) Inhalation daily  valsartan 320 milliGRAM(s) Oral daily    MEDICATIONS  (PRN):  acetaminophen     Tablet .. 975 milliGRAM(s) Oral every 6 hours PRN Moderate Pain (4 - 6)  albuterol    90 MICROgram(s) HFA Inhaler 2 Puff(s) Inhalation every 6 hours PRN Shortness of Breath and/or Wheezing  mesalamine Suppository 1000 milliGRAM(s) Rectal at bedtime PRN ulcerative colitis  methocarbamol 500 milliGRAM(s) Oral two times a day PRN Muscle Spasm  morphine  - Injectable 4 milliGRAM(s) IV Push every 6 hours PRN Severe Pain (7 - 10)

## 2022-11-30 NOTE — CONSULT NOTE ADULT - ASSESSMENT
Patient is an obese 51 y/o woman with intermittent asthma, hypertension, T2DM, STEPHANIE, family history of premature CAD, chronic LBBB, sinus tachycardia, CCTA with low calcium score who presents with intractable abdominal pain noted to be febrile and tachycardic.        Sinus tachycardia/chronic LBBB  - physiologic in the setting of acute pelvic process   - GYN follow-up  - IVF  - abx per primary team  - prior TTE with structurally normal heart, will defer repeat at this time   - tele    Additional recommendations pending clinical course

## 2022-11-30 NOTE — PROVIDER CONTACT NOTE (OTHER) - SITUATION
pt found to have increased WOB, spo2 ~88% on RA, HR of 125, and a BP of 95/54. Pt c/o SOB and difficulty breathing.

## 2022-12-01 LAB
ALBUMIN SERPL ELPH-MCNC: 1.8 G/DL — LOW (ref 3.3–5.2)
ALP SERPL-CCNC: 58 U/L — SIGNIFICANT CHANGE UP (ref 40–120)
ALT FLD-CCNC: 16 U/L — SIGNIFICANT CHANGE UP
ANION GAP SERPL CALC-SCNC: 12 MMOL/L — SIGNIFICANT CHANGE UP (ref 5–17)
AST SERPL-CCNC: 27 U/L — SIGNIFICANT CHANGE UP
BILIRUB SERPL-MCNC: 0.5 MG/DL — SIGNIFICANT CHANGE UP (ref 0.4–2)
BUN SERPL-MCNC: 24.6 MG/DL — HIGH (ref 8–20)
CALCIUM SERPL-MCNC: 8.2 MG/DL — LOW (ref 8.4–10.5)
CHLORIDE SERPL-SCNC: 100 MMOL/L — SIGNIFICANT CHANGE UP (ref 96–108)
CO2 SERPL-SCNC: 22 MMOL/L — SIGNIFICANT CHANGE UP (ref 22–29)
CREAT SERPL-MCNC: 1.23 MG/DL — SIGNIFICANT CHANGE UP (ref 0.5–1.3)
CULTURE RESULTS: SIGNIFICANT CHANGE UP
EGFR: 53 ML/MIN/1.73M2 — LOW
GLUCOSE BLDC GLUCOMTR-MCNC: 108 MG/DL — HIGH (ref 70–99)
GLUCOSE BLDC GLUCOMTR-MCNC: 114 MG/DL — HIGH (ref 70–99)
GLUCOSE BLDC GLUCOMTR-MCNC: 121 MG/DL — HIGH (ref 70–99)
GLUCOSE BLDC GLUCOMTR-MCNC: 123 MG/DL — HIGH (ref 70–99)
GLUCOSE BLDC GLUCOMTR-MCNC: 127 MG/DL — HIGH (ref 70–99)
GLUCOSE BLDC GLUCOMTR-MCNC: 134 MG/DL — HIGH (ref 70–99)
GLUCOSE SERPL-MCNC: 138 MG/DL — HIGH (ref 70–99)
HCT VFR BLD CALC: 24.9 % — LOW (ref 34.5–45)
HGB BLD-MCNC: 8 G/DL — LOW (ref 11.5–15.5)
MAGNESIUM SERPL-MCNC: 1.9 MG/DL — SIGNIFICANT CHANGE UP (ref 1.6–2.6)
MCHC RBC-ENTMCNC: 27 PG — SIGNIFICANT CHANGE UP (ref 27–34)
MCHC RBC-ENTMCNC: 32.1 GM/DL — SIGNIFICANT CHANGE UP (ref 32–36)
MCV RBC AUTO: 84.1 FL — SIGNIFICANT CHANGE UP (ref 80–100)
PHOSPHATE SERPL-MCNC: 4.2 MG/DL — SIGNIFICANT CHANGE UP (ref 2.4–4.7)
PLATELET # BLD AUTO: 418 K/UL — HIGH (ref 150–400)
POTASSIUM SERPL-MCNC: 4.8 MMOL/L — SIGNIFICANT CHANGE UP (ref 3.5–5.3)
POTASSIUM SERPL-SCNC: 4.8 MMOL/L — SIGNIFICANT CHANGE UP (ref 3.5–5.3)
PROT SERPL-MCNC: 5.9 G/DL — LOW (ref 6.6–8.7)
RBC # BLD: 2.96 M/UL — LOW (ref 3.8–5.2)
RBC # FLD: 16.4 % — HIGH (ref 10.3–14.5)
SODIUM SERPL-SCNC: 134 MMOL/L — LOW (ref 135–145)
SPECIMEN SOURCE: SIGNIFICANT CHANGE UP
WBC # BLD: 16 K/UL — HIGH (ref 3.8–10.5)
WBC # FLD AUTO: 16 K/UL — HIGH (ref 3.8–10.5)

## 2022-12-01 PROCEDURE — 99233 SBSQ HOSP IP/OBS HIGH 50: CPT

## 2022-12-01 RX ORDER — SODIUM CHLORIDE 0.65 %
1 AEROSOL, SPRAY (ML) NASAL
Refills: 0 | Status: DISCONTINUED | OUTPATIENT
Start: 2022-12-01 | End: 2022-12-07

## 2022-12-01 RX ORDER — MAGNESIUM SULFATE 500 MG/ML
1 VIAL (ML) INJECTION ONCE
Refills: 0 | Status: COMPLETED | OUTPATIENT
Start: 2022-12-01 | End: 2022-12-01

## 2022-12-01 RX ORDER — VANCOMYCIN HCL 1 G
750 VIAL (EA) INTRAVENOUS EVERY 12 HOURS
Refills: 0 | Status: DISCONTINUED | OUTPATIENT
Start: 2022-12-01 | End: 2022-12-02

## 2022-12-01 RX ORDER — LORATADINE 10 MG/1
10 TABLET ORAL DAILY
Refills: 0 | Status: DISCONTINUED | OUTPATIENT
Start: 2022-12-01 | End: 2022-12-07

## 2022-12-01 RX ORDER — MONTELUKAST 4 MG/1
10 TABLET, CHEWABLE ORAL AT BEDTIME
Refills: 0 | Status: DISCONTINUED | OUTPATIENT
Start: 2022-12-01 | End: 2022-12-07

## 2022-12-01 RX ORDER — FLUTICASONE PROPIONATE 50 MCG
1 SPRAY, SUSPENSION NASAL
Refills: 0 | Status: DISCONTINUED | OUTPATIENT
Start: 2022-12-01 | End: 2022-12-07

## 2022-12-01 RX ADMIN — SODIUM CHLORIDE 185 MILLILITER(S): 9 INJECTION, SOLUTION INTRAVENOUS at 04:35

## 2022-12-01 RX ADMIN — GABAPENTIN 300 MILLIGRAM(S): 400 CAPSULE ORAL at 13:39

## 2022-12-01 RX ADMIN — BUDESONIDE AND FORMOTEROL FUMARATE DIHYDRATE 2 PUFF(S): 160; 4.5 AEROSOL RESPIRATORY (INHALATION) at 21:21

## 2022-12-01 RX ADMIN — OXYCODONE HYDROCHLORIDE 10 MILLIGRAM(S): 5 TABLET ORAL at 21:00

## 2022-12-01 RX ADMIN — GABAPENTIN 300 MILLIGRAM(S): 400 CAPSULE ORAL at 00:21

## 2022-12-01 RX ADMIN — HYDROMORPHONE HYDROCHLORIDE 30 MILLILITER(S): 2 INJECTION INTRAMUSCULAR; INTRAVENOUS; SUBCUTANEOUS at 04:33

## 2022-12-01 RX ADMIN — PIPERACILLIN AND TAZOBACTAM 25 GRAM(S): 4; .5 INJECTION, POWDER, LYOPHILIZED, FOR SOLUTION INTRAVENOUS at 22:50

## 2022-12-01 RX ADMIN — Medication 975 MILLIGRAM(S): at 18:08

## 2022-12-01 RX ADMIN — HEPARIN SODIUM 5000 UNIT(S): 5000 INJECTION INTRAVENOUS; SUBCUTANEOUS at 02:33

## 2022-12-01 RX ADMIN — Medication 250 MILLIGRAM(S): at 17:29

## 2022-12-01 RX ADMIN — Medication 1 SPRAY(S): at 17:26

## 2022-12-01 RX ADMIN — BUDESONIDE AND FORMOTEROL FUMARATE DIHYDRATE 2 PUFF(S): 160; 4.5 AEROSOL RESPIRATORY (INHALATION) at 10:04

## 2022-12-01 RX ADMIN — Medication 975 MILLIGRAM(S): at 13:31

## 2022-12-01 RX ADMIN — GABAPENTIN 300 MILLIGRAM(S): 400 CAPSULE ORAL at 05:56

## 2022-12-01 RX ADMIN — MONTELUKAST 10 MILLIGRAM(S): 4 TABLET, CHEWABLE ORAL at 22:34

## 2022-12-01 RX ADMIN — ENOXAPARIN SODIUM 40 MILLIGRAM(S): 100 INJECTION SUBCUTANEOUS at 12:25

## 2022-12-01 RX ADMIN — Medication 975 MILLIGRAM(S): at 05:56

## 2022-12-01 RX ADMIN — ATORVASTATIN CALCIUM 10 MILLIGRAM(S): 80 TABLET, FILM COATED ORAL at 00:20

## 2022-12-01 RX ADMIN — Medication 975 MILLIGRAM(S): at 06:00

## 2022-12-01 RX ADMIN — PIPERACILLIN AND TAZOBACTAM 25 GRAM(S): 4; .5 INJECTION, POWDER, LYOPHILIZED, FOR SOLUTION INTRAVENOUS at 00:22

## 2022-12-01 RX ADMIN — Medication 975 MILLIGRAM(S): at 17:27

## 2022-12-01 RX ADMIN — PIPERACILLIN AND TAZOBACTAM 25 GRAM(S): 4; .5 INJECTION, POWDER, LYOPHILIZED, FOR SOLUTION INTRAVENOUS at 05:59

## 2022-12-01 RX ADMIN — Medication 975 MILLIGRAM(S): at 12:26

## 2022-12-01 RX ADMIN — ATORVASTATIN CALCIUM 10 MILLIGRAM(S): 80 TABLET, FILM COATED ORAL at 22:36

## 2022-12-01 RX ADMIN — LORATADINE 10 MILLIGRAM(S): 10 TABLET ORAL at 17:46

## 2022-12-01 RX ADMIN — PANTOPRAZOLE SODIUM 20 MILLIGRAM(S): 20 TABLET, DELAYED RELEASE ORAL at 12:27

## 2022-12-01 RX ADMIN — ONDANSETRON 4 MILLIGRAM(S): 8 TABLET, FILM COATED ORAL at 22:44

## 2022-12-01 RX ADMIN — Medication 975 MILLIGRAM(S): at 00:19

## 2022-12-01 RX ADMIN — Medication 975 MILLIGRAM(S): at 01:00

## 2022-12-01 RX ADMIN — OXYCODONE HYDROCHLORIDE 10 MILLIGRAM(S): 5 TABLET ORAL at 20:44

## 2022-12-01 RX ADMIN — GABAPENTIN 300 MILLIGRAM(S): 400 CAPSULE ORAL at 22:36

## 2022-12-01 RX ADMIN — PIPERACILLIN AND TAZOBACTAM 25 GRAM(S): 4; .5 INJECTION, POWDER, LYOPHILIZED, FOR SOLUTION INTRAVENOUS at 13:39

## 2022-12-01 RX ADMIN — Medication 10 MILLIEQUIVALENT(S): at 12:26

## 2022-12-01 RX ADMIN — ENOXAPARIN SODIUM 40 MILLIGRAM(S): 100 INJECTION SUBCUTANEOUS at 22:39

## 2022-12-01 RX ADMIN — Medication 100 GRAM(S): at 07:38

## 2022-12-01 NOTE — PROGRESS NOTE ADULT - SUBJECTIVE AND OBJECTIVE BOX
[facility]  INFECTIOUS DISEASES at [place] and [place]  =======================================================                               Tien Moore MD#   Camille Bateman MD*                             Antonia Mixon MD*   Yola Redmond MD*            Diplomates American Board of Internal Medicine & Infectious Diseases                # [place] Office - Appt - Tel  [phone] Fax [phone]                * [place] Office - Appt - Tel [phone] Fax [phone]                                  Hospital Consult line:  [phone]  =======================================================    LORENA DUNCAN [id number]    Follow up: Pelvic infection    s/p exp lap, JOANNE BSO, abdominal washout with 2Liters pus drained and wound vac placed 11/30      Allergies:  codeine (Nausea)  IV Contrast (Other; Pruritus; Hives)       REVIEW OF SYSTEMS:  CONSTITUTIONAL:  No Fever or chills  HEENT:  No diplopia or blurred vision.  No earache, sore throat or runny nose.  CARDIOVASCULAR:  No chest pain  RESPIRATORY:  No cough, shortness of breath  GASTROINTESTINAL:  No nausea, vomiting or diarrhea. + Abd pain   GENITOURINARY:  No dysuria, frequency or urgency. + Blood in urine  MUSCULOSKELETAL:  no joint aches, no muscle pain  SKIN:  No change in skin, hair or nails.  NEUROLOGIC:  No Headaches, seizures  PSYCHIATRIC:  No disorder of thought or mood.  ENDOCRINE:  No heat or cold intolerance  HEMATOLOGICAL:  No easy bruising or bleeding.       Physical Exam:  GEN: NAD  HEENT: normocephalic and atraumatic. EOMI. PERRL.    NECK: Supple.   LUNGS: CTA B/L.  HEART: RRR  ABDOMEN: Soft, Obese.  +BS.  Diffuse tenderness, No rebound.   : No CVA tenderness  EXTREMITIES: Without  edema.  MSK: No joint swelling  NEUROLOGIC: No Focal Deficits   PSYCHIATRIC: Appropriate affect .  SKIN: No rash      Vitals:  T(F): 98.1 (01 Dec 2022 15:24), Max: 98.6 (01 Dec 2022 04:00)  HR: 101 (01 Dec 2022 15:24)  BP: 98/62 (01 Dec 2022 15:24)  RR: 18 (01 Dec 2022 15:24)  SpO2: 87% (01 Dec 2022 15:24) (87% - 100%)  temp max in last 48H T(F): , Max: 100.2 (11-30-22 @ 03:25)    Current Antibiotics:  piperacillin/tazobactam IVPB.. 3.375 Gram(s) IV Intermittent every 8 hours  vancomycin  IVPB 750 milliGRAM(s) IV Intermittent every 12 hours    Other medications:  acetaminophen     Tablet .. 975 milliGRAM(s) Oral every 6 hours  amLODIPine   Tablet 10 milliGRAM(s) Oral daily  atorvastatin 10 milliGRAM(s) Oral at bedtime  budesonide  80 MICROgram(s)/formoterol 4.5 MICROgram(s) Inhaler 2 Puff(s) Inhalation two times a day  dextrose 5%. 1000 milliLiter(s) IV Continuous <Continuous>  dextrose 5%. 1000 milliLiter(s) IV Continuous <Continuous>  dextrose 50% Injectable 25 Gram(s) IV Push once  dextrose 50% Injectable 12.5 Gram(s) IV Push once  dextrose 50% Injectable 25 Gram(s) IV Push once  enoxaparin Injectable 40 milliGRAM(s) SubCutaneous every 12 hours  fluticasone propionate 50 MICROgram(s)/spray Nasal Spray 1 Spray(s) Both Nostrils two times a day  gabapentin 300 milliGRAM(s) Oral every 8 hours  glucagon  Injectable 1 milliGRAM(s) IntraMuscular once  insulin lispro (ADMELOG) corrective regimen sliding scale   SubCutaneous three times a day before meals  insulin lispro (ADMELOG) corrective regimen sliding scale   SubCutaneous at bedtime  lactated ringers Bolus 1000 milliLiter(s) IV Bolus once  loratadine 10 milliGRAM(s) Oral daily  metoprolol succinate ER 25 milliGRAM(s) Oral daily  pantoprazole    Tablet 20 milliGRAM(s) Oral daily  potassium chloride    Tablet ER 10 milliEquivalent(s) Oral daily  tiotropium 2.5 MICROgram(s) Inhaler 2 Puff(s) Inhalation daily                            8.0    16.00 )-----------( 418      ( 01 Dec 2022 05:45 )             24.9     12-01    134<L>  |  100  |  24.6<H>  ----------------------------<  138<H>  4.8   |  22.0  |  1.23    Ca    8.2<L>      01 Dec 2022 05:45  Phos  4.2     12-01  Mg     1.9     12-01    TPro  5.9<L>  /  Alb  1.8<L>  /  TBili  0.5  /  DBili  x   /  AST  27  /  ALT  16  /  AlkPhos  58  12-01    RECENT CULTURES:  11-30 @ 02:59    [facility]    11-29 @ 19:02 Clean Catch Clean Catch (Midstream)     <10,000 CFU/mL Normal Urogenital Vivian    11-29 @ 12:45 .Blood Blood     No growth to date.    11-29 @ 12:35 .Blood Blood     No growth to date.            WBC Count: 16.00 K/uL (12-01-22 @ 05:45)  WBC Count: 18.12 K/uL (11-30-22 @ 16:35)  WBC Count: 16.86 K/uL (11-30-22 @ 07:50)  WBC Count: 7.06 K/uL (11-29-22 @ 10:25)    Creatinine, Serum: 1.23 mg/dL (12-01-22 @ 05:45)  Creatinine, Serum: 1.95 mg/dL (11-30-22 @ 13:15)  Creatinine, Serum: 1.88 mg/dL (11-30-22 @ 07:50)  Creatinine, Serum: 0.92 mg/dL (11-29-22 @ 10:25)           SARS-CoV-2: Ally (11-30-22 @ 02:59)

## 2022-12-01 NOTE — PROGRESS NOTE ADULT - ASSESSMENT
52y  Female with h/o uterine fibroids presented to ED 11/29 with 1 week of diffuse abdominal pain. Patient reports pain was intermittent for 1 week prior to presentation with flare ups on 11/17, 11/20 and then 11/29. Pain initially located in the RUQ/RLQ, and worsened and is now diffuse. Pain is described as stabbing and episodic; episodes last approx. 1 hour and are partially relieved by Tylenol/ibuprofen. Associated with episode of blood in the urine on 11/30. She reports no dysuria but reports increased urge. Patient with hx of irregular vaginal bleeding since 2015. States menstrual cycle may last 7-10 day and not consistently every month. This has been since initial fibroid diagnosis in 2015. Pt sees Dr. Tillman outpatient and was referred to GYN ONC for a hysterectomy in May 2022 for finding of 18 wk size large fibroid uterus, but was unable to followup due to insurance issues.  In the ED, patient was found to be febrile 100.6 and worked up for Sepsis due to tachycardia and hypotension. She received 1L fluid bolus x2. S/p Zosyn q8, Vancomycin x1, Zofran; Urine, blood cx collected. CT abd/pelvis obtained showing: Markedly abnormal and enlarged uterus with suspected necrosis of a 16.3 cm right fundal uterine mass, uterus 24 x18 x18 on imaging. The mass contains small foci of gas, raising concern for superimposed infection. Abnormal small to moderate amount of abdominal and pelvic ascites with anterior peritoneal soft tissue stranding. Continued on Zosyn.       Infection of Necrotic uterine mass with gas  s/p exp lap, JOANNE BSO, abdominal washout with 2Liters pus drained and wound vac placed 11/30  Fever  Leukocytosis   STEPHANIE      - Blood cultures 11/29 no growth  - RVP/COVID 19 PCR 11/30 negative   - CT abd/pelvis obtained showing: Markedly abnormal and enlarged uterus with suspected necrosis of a 16.3 cm right fundal uterine mass, uterus 24 x18 x18 on imaging. The mass contains small foci of gas, raising concern for superimposed infection. Abnormal small to moderate amount of abdominal and pelvic ascites with anterior peritoneal soft tissue stranding.  - UA negative for UTI   - Continue Zosyn  - d/c Clindamycin 900mg IV [id number]  - Continue Vancomycin  - Monitor trough  - Monitor for Vancomycin toxicity   - Vancomycin required at this time pending culture results  - s/p exp lap, JOANNE BSO, abdominal washout with 2Liters pus drained and wound vac placed 11/30  - GYN following   - Follow up cultures  - Trend Fever  - Trend WBC      Will Follow

## 2022-12-01 NOTE — PROGRESS NOTE ADULT - SUBJECTIVE AND OBJECTIVE BOX
CC: Diffuse abdominal pain, Sepsis, s/p Ex Lap surgery (01 Dec 2022 07:08)    HPI:  52F with PMH of uterine fibroids, DM2, HTN, asthma presents to ED with 1 week of abdominal pain accompanied by vomiting and blood in the urine.     INTERVAL HPI/OVERNIGHT EVENTS: Patient seen and examined lying in bed.  Patient complaining of incisional pain when coughing/clearing her throat.  Patient reports a h/o chronic nasal drip, for which she takes Flonase at home.  Patient states she had some dizziness when getting up this morning.  Patient denies any headache, SOB, CP, nausea, vomiting, dysuria.  Last BM prior to coming in to the hospital.  Other ROS reviewed and are negative.    Vital Signs Last 24 Hrs  T(C): 36.9 (01 Dec 2022 12:29), Max: 37 (01 Dec 2022 04:00)  T(F): 98.4 (01 Dec 2022 12:), Max: 98.6 (01 Dec 2022 04:00)  HR: 102 (01 Dec 2022 12:) (89 - 115)  BP: 101/63 (01 Dec 2022 12:29) (99/77 - 141/111)  BP(mean): 75 (2022 23:49) (68 - 86)  RR: 16 (01 Dec 2022 12:29) (14 - 22)  SpO2: 95% (01 Dec 2022 12:29) (88% - 100%)    Parameters below as of 01 Dec 2022 12:29  Patient On (Oxygen Delivery Method): room air      I&O's Detail    2022 07:01  -  01 Dec 2022 07:00  --------------------------------------------------------  IN:    IV PiggyBack: 150 mL    Lactated Ringers: 1480 mL  Total IN: 1630 mL    OUT:    Indwelling Catheter - Urethral (mL): 1000 mL  Total OUT: 1000 mL    Total NET: 630 mL      01 Dec 2022 07:01  -  01 Dec 2022 12:55  --------------------------------------------------------  IN:  Total IN: 0 mL    OUT:    Voided (mL): 1300 mL  Total OUT: 1300 mL    Total NET: -1300 mL    PHYSICAL EXAM:  GENERAL: NAD  HEAD:  Atraumatic, Normocephalic  EYES: EOMI, PERRLA, conjunctiva and sclera clear  NECK: Supple, No JVD, Normal thyroid  NERVOUS SYSTEM:  Alert & Oriented X3, Good concentration; Motor Strength 5/5 B/L upper and lower extremities  CHEST/LUNG: Clear to auscultation bilaterally  HEART: Regular rate and rhythm; No murmurs, rubs, or gallops  ABDOMEN: Soft, tender to palpation at incision, Nondistended; Bowel sounds present; midline incision with vac dressing in place  EXTREMITIES:  2+ Peripheral Pulses  SKIN: No rashes or lesions                              8.0    16.00 )-----------( 418      ( 01 Dec 2022 05:45 )             24.9     01 Dec 2022 05:45    134    |  100    |  24.6   ----------------------------<  138    4.8     |  22.0   |  1.23     Ca    8.2        01 Dec 2022 05:45  Phos  4.2       01 Dec 2022 05:45  Mg     1.9       01 Dec 2022 05:45    TPro  5.9    /  Alb  1.8    /  TBili  0.5    /  DBili  x      /  AST  27     /  ALT  16     /  AlkPhos  58     01 Dec 2022 05:45    PT/INR - ( 2022 16:35 )   PT: 17.5 sec;   INR: 1.50 ratio         PTT - ( 2022 16:35 )  PTT:25.8 sec    CAPILLARY BLOOD GLUCOSE  POCT Blood Glucose.: 134 mg/dL (01 Dec 2022 12:32)  POCT Blood Glucose.: 121 mg/dL (01 Dec 2022 09:01)  POCT Blood Glucose.: 127 mg/dL (01 Dec 2022 00:27)  POCT Blood Glucose.: 144 mg/dL (2022 20:40)  POCT Blood Glucose.: 110 mg/dL (2022 17:05)  POCT Blood Glucose.: 123 mg/dL (2022 16:41)    LIVER FUNCTIONS - ( 01 Dec 2022 05:45 )  Alb: 1.8 g/dL / Pro: 5.9 g/dL / ALK PHOS: 58 U/L / ALT: 16 U/L / AST: 27 U/L / GGT: x           Urinalysis Basic - ( 2022 19:02 )    Color: Yellow / Appearance: Clear / S.010 / pH: x  Gluc: x / Ketone: Trace  / Bili: Small / Urobili: 1   Blood: x / Protein: 30 mg/dL / Nitrite: Positive   Leuk Esterase: Trace / RBC: 0-2 /HPF / WBC 3-5 /HPF   Sq Epi: x / Non Sq Epi: Few / Bacteria: Few    Culture - Urine (22 @ 19:02)    Specimen Source: Clean Catch Clean Catch (Midstream)    Culture Results:   <10,000 CFU/mL Normal Urogenital Vivian    Culture - Blood (22 @ 12:45)    Specimen Source: .Blood Blood    Culture Results:   No growth to date.    Culture - Blood (22 @ 12:35)    Specimen Source: .Blood Blood    Culture Results:   No growth to date.    MEDICATIONS  (STANDING):  acetaminophen     Tablet .. 975 milliGRAM(s) Oral every 6 hours  amLODIPine   Tablet 10 milliGRAM(s) Oral daily  atorvastatin 10 milliGRAM(s) Oral at bedtime  budesonide  80 MICROgram(s)/formoterol 4.5 MICROgram(s) Inhaler 2 Puff(s) Inhalation two times a day  dextrose 5%. 1000 milliLiter(s) (50 mL/Hr) IV Continuous <Continuous>  dextrose 5%. 1000 milliLiter(s) (100 mL/Hr) IV Continuous <Continuous>  dextrose 50% Injectable 25 Gram(s) IV Push once  dextrose 50% Injectable 12.5 Gram(s) IV Push once  dextrose 50% Injectable 25 Gram(s) IV Push once  enoxaparin Injectable 40 milliGRAM(s) SubCutaneous every 12 hours  gabapentin 300 milliGRAM(s) Oral every 8 hours  glucagon  Injectable 1 milliGRAM(s) IntraMuscular once  insulin lispro (ADMELOG) corrective regimen sliding scale   SubCutaneous three times a day before meals  insulin lispro (ADMELOG) corrective regimen sliding scale   SubCutaneous at bedtime  lactated ringers Bolus 1000 milliLiter(s) IV Bolus once  metoprolol succinate ER 25 milliGRAM(s) Oral daily  pantoprazole    Tablet 20 milliGRAM(s) Oral daily  piperacillin/tazobactam IVPB.. 3.375 Gram(s) IV Intermittent every 8 hours  potassium chloride    Tablet ER 10 milliEquivalent(s) Oral daily  tiotropium 2.5 MICROgram(s) Inhaler 2 Puff(s) Inhalation daily    MEDICATIONS  (PRN):  acetaminophen     Tablet .. 975 milliGRAM(s) Oral every 6 hours PRN Mild Pain (1 - 3)  albuterol    90 MICROgram(s) HFA Inhaler 2 Puff(s) Inhalation every 6 hours PRN Shortness of Breath and/or Wheezing  dextrose Oral Gel 15 Gram(s) Oral once PRN Blood Glucose LESS THAN 70 milliGRAM(s)/deciliter  mesalamine Suppository 1000 milliGRAM(s) Rectal at bedtime PRN ulcerative colitis  naloxone Injectable 0.1 milliGRAM(s) IV Push every 3 minutes PRN For ANY of the following changes in patient status:  A. RR LESS THAN 10 breaths per minute, B. Oxygen saturation LESS THAN 90%, C. Sedation score of 6  ondansetron    Tablet 8 milliGRAM(s) Oral every 8 hours PRN Nausea and/or Vomiting  ondansetron Injectable 4 milliGRAM(s) IV Push every 6 hours PRN Nausea  oxyCODONE    IR 5 milliGRAM(s) Oral every 3 hours PRN Moderate Pain (4 - 6)  oxyCODONE    IR 10 milliGRAM(s) Oral every 3 hours PRN Severe Pain (7 - 10)  simethicone 80 milliGRAM(s) Chew every 6 hours PRN Gas

## 2022-12-01 NOTE — PROGRESS NOTE ADULT - NS ATTEND AMEND GEN_ALL_CORE FT
Patient seen and examined ,   c/o episode of dizziness earlier , post nasal drip ,   abdominal pain at surgical site , denies sob / chest pain   ICU Vital Signs Last 24 Hrs  T(C): 36.9 (01 Dec 2022 12:29), Max: 37 (01 Dec 2022 04:00)  T(F): 98.4 (01 Dec 2022 12:29), Max: 98.6 (01 Dec 2022 04:00)  HR: 102 (01 Dec 2022 12:29) (89 - 112)  BP: 101/63 (01 Dec 2022 12:29) (99/77 - 140/60)  BP(mean): 75 (30 Nov 2022 23:49) (68 - 86)  RR: 16 (01 Dec 2022 12:29) (14 - 22)  SpO2: 95% (01 Dec 2022 12:29) (88% - 100%)    O2 Parameters below as of 01 Dec 2022 12:29  Patient On (Oxygen Delivery Method): room air    Lungs: CTA B/L   CVS:S1S2 , no rub , no murmurs   Abd: abdominal binder + , appropriate postop tenderness     Above noted and reviewed with NP .   Will follow along with you .

## 2022-12-01 NOTE — CHART NOTE - NSCHARTNOTEFT_GEN_A_CORE
Patient seen and examined at bedside with Dr. Whaley for PM rounds. Patient sitting up in bed. She reports feeling well. She is continuing to require 0.5L/min by NC. She reports mild nasal congestion as she has not been taking her flonase or saline nasal spray. She denies pain and nausea/vomiting. She is tolerating a regular diet and voiding spontaneously. Patient seen and examined at bedside with Dr. Whaley for PM rounds. Patient sitting up in bed. She reports feeling well. She is continuing to require 0.5L/min by NC. She reports mild nasal congestion as she has not been taking her flonase or saline nasal spray. She denies pain and nausea/vomiting. She is tolerating a regular diet and voiding spontaneously.    ___________    Attestation: Patient out of bed sitting in chair. Per RN, requiring 0.5L/min nasal cannula. Patient reports that she experiences shortness of breath mostly with activity getting from bed to chair. She denies fevers or chills. She has been tolerating PO and passing gas. Incentive spirometry encouraged. Social work and PT ordered to assist patient with getting back to baseline when ready for discharge. Patient family visiting, providing good support. Albuterol inhaler encouraged when patient feels she needs. Flonase ordered. Will follow up AM labs and consider CT PE if continues to have SpO2 <90% on room air. While she is not tachycardic at baseline, she has risk factors. Continue lovenox.     Ayesha Whaley PGY5

## 2022-12-01 NOTE — PROGRESS NOTE ADULT - ASSESSMENT
53yo G0  LMP 11/15/22 with PMH of uterine fibroids and AUB admitted for sepsis due to fever, increased RR, and increased HR now with worsening leukocytosis.   Now, POD#1 s/p ex-lap JOANNE, BSO, abdominal wash-out for pelvic mass and removal of 2 L ascites fluid.       Cardiac: Hx of HTN. BP medications ordered with strict hold parameters. BP mildly low, will continue to monitor.   Pulmonary: Hx of asthma. Home medications ordered. Incentive spirometer at bedside.   Neuro: Pain well controlled with current regimen. Will d/c PCA today and transition to PO medications.  Endo: Hx of DM. ISS ordered. Medicine consulted. Recommendations appreciated.   GI: Tolerating regular diet.   : Adequate UOP. 125 cc/hour. Preop creatinine 1.95 -> 1.23. Will d/c Miguel and passive TOV pending.   ID: Patient on Zosyn IV due to Sepsis 2/2 infected Pelvic mass. Preop WBC 18 -> postop 16. Will continue broad spectrum abx until Blood/ Urine cultures result and 48 hours afberile.   Heme: Preop Hgb 9.4-> 8.0. SCDs when not ambulating, Heparin ordered and will transition to Lovenox for VTE prophylaxis.   Skin: No active disease. Dressing/wound with no signs of infection.   Psych: no active problems   FEN: IVF at 185cc/hr, will lower rate to 100 cc/ hour and will d/c once PO intake is adequate. Electrolytes wnl this AM.  MSK: Encouraged ambulation. Will consult PT as patient states uses walker at home for assistance.   Dispo: Continue inpatient management  Code Status: FULL

## 2022-12-01 NOTE — PROGRESS NOTE ADULT - SUBJECTIVE AND OBJECTIVE BOX
GYNECOLOGIC ONCOLOGY PROGRESS NOTE    POD#1   HOD #3    PROBLEMS:  Sepsis  Uterine Fibroid  Pelvic mass       T(F): 98.6 (22 @ 04:00), Max: 98.6 (22 @ 04:00)  HR: 99 (22 @ 04:00) (99 - 115)  BP: 100/64 (22 @ 04:00) (92/52 - 141/111)  RR: 18 (22 @ 04:00) (14 - 22)  SpO2: 95% (22 @ 04:00) (95% - 100%)  Wt(kg): -- is seen and examined at bedside.     SUBJECTIVE:  Patient seen at bedside.  States feel much improved after surgery.   Patient is without complaints.  Pain well-controlled with PCA, minimal use endorses.   Flatus: denies   Denies Nausea, Vomiting or Diarrhea.  Denies fevers, chills, shortness of breath, chest pain or dyspnea on exertion.  Tolerating diet.  Voiding through Miguel bag, with dark yellow urine.     OBJECTIVE:     VITALS:  T(F): 98.6 (22 @ 04:00), Max: 98.6 (22 @ 04:00)  HR: 99 (22 @ 04:00) (99 - 115)  BP: 100/64 (22 @ 04:00) (92/52 - 141/111)  RR: 18 (22 @ 04:00) (14 - 22)  SpO2: 95% (22 @ 04:00) (95% - 100%)  Wt(kg): --    I&O's Summary    2022 07:01  -  01 Dec 2022 07:00  --------------------------------------------------------  IN: 1630 mL / OUT: 1000 mL / NET: 630 mL        MEDICATIONS  (STANDING):  acetaminophen     Tablet .. 975 milliGRAM(s) Oral every 6 hours  amLODIPine   Tablet 10 milliGRAM(s) Oral daily  atorvastatin 10 milliGRAM(s) Oral at bedtime  budesonide  80 MICROgram(s)/formoterol 4.5 MICROgram(s) Inhaler 2 Puff(s) Inhalation two times a day  dextrose 5%. 1000 milliLiter(s) (50 mL/Hr) IV Continuous <Continuous>  dextrose 5%. 1000 milliLiter(s) (100 mL/Hr) IV Continuous <Continuous>  dextrose 50% Injectable 25 Gram(s) IV Push once  dextrose 50% Injectable 12.5 Gram(s) IV Push once  dextrose 50% Injectable 25 Gram(s) IV Push once  enoxaparin Injectable 40 milliGRAM(s) SubCutaneous every 12 hours  gabapentin 300 milliGRAM(s) Oral every 8 hours  glucagon  Injectable 1 milliGRAM(s) IntraMuscular once  HYDROmorphone PCA (1 mG/mL) 30 milliLiter(s) PCA Continuous PCA Continuous  insulin lispro (ADMELOG) corrective regimen sliding scale   SubCutaneous three times a day before meals  insulin lispro (ADMELOG) corrective regimen sliding scale   SubCutaneous at bedtime  lactated ringers Bolus 1000 milliLiter(s) IV Bolus once  lactated ringers. 1000 milliLiter(s) (185 mL/Hr) IV Continuous <Continuous>  metoprolol succinate ER 25 milliGRAM(s) Oral daily  pantoprazole    Tablet 20 milliGRAM(s) Oral daily  piperacillin/tazobactam IVPB.. 3.375 Gram(s) IV Intermittent every 8 hours  potassium chloride    Tablet ER 10 milliEquivalent(s) Oral daily  tiotropium 2.5 MICROgram(s) Inhaler 2 Puff(s) Inhalation daily    MEDICATIONS  (PRN):  acetaminophen     Tablet .. 975 milliGRAM(s) Oral every 6 hours PRN Mild Pain (1 - 3)  albuterol    90 MICROgram(s) HFA Inhaler 2 Puff(s) Inhalation every 6 hours PRN Shortness of Breath and/or Wheezing  dextrose Oral Gel 15 Gram(s) Oral once PRN Blood Glucose LESS THAN 70 milliGRAM(s)/deciliter  mesalamine Suppository 1000 milliGRAM(s) Rectal at bedtime PRN ulcerative colitis  naloxone Injectable 0.1 milliGRAM(s) IV Push every 3 minutes PRN For ANY of the following changes in patient status:  A. RR LESS THAN 10 breaths per minute, B. Oxygen saturation LESS THAN 90%, C. Sedation score of 6  ondansetron    Tablet 8 milliGRAM(s) Oral every 8 hours PRN Nausea and/or Vomiting  ondansetron Injectable 4 milliGRAM(s) IV Push every 6 hours PRN Nausea  oxyCODONE    IR 5 milliGRAM(s) Oral every 3 hours PRN Moderate Pain (4 - 6)  oxyCODONE    IR 10 milliGRAM(s) Oral every 3 hours PRN Severe Pain (7 - 10)  simethicone 80 milliGRAM(s) Chew every 6 hours PRN Gas      Physical Exam:  Constitutional: NAD  Pulmonary: clear to auscultation bilaterally   Cardiovascular: Regular rate and rhythm   Abdomen: soft, non-tender, mildly distended, tympanic, hypoactive bowel sounds  Incision: Vertical incision with staples. Clean, dry, intact.  Prevena dressing in place, with good suction. Without signs of infection or hernia.  Extremities: B/l LE and UE edema +1, denies calve tenderness, Guillermo's sign negative.      LABS:                        8.0    16.00 )-----------( 418      ( 01 Dec 2022 05:45 )             24.9     11-30    134<L>  |  97  |  29.6<H>  ----------------------------<  132<H>  4.6   |  21.0<L>  |  1.95<H>    Ca    9.0      2022 13:15    TPro  7.6  /  Alb  2.7<L>  /  TBili  1.4  /  DBili  x   /  AST  15  /  ALT  14  /  AlkPhos  64  11-30    PT/INR - ( 2022 16:35 )   PT: 17.5 sec;   INR: 1.50 ratio         PTT - ( 2022 16:35 )  PTT:25.8 sec  Urinalysis Basic - ( 2022 19:02 )    Color: Yellow / Appearance: Clear / S.010 / pH: x  Gluc: x / Ketone: Trace  / Bili: Small / Urobili: 1   Blood: x / Protein: 30 mg/dL / Nitrite: Positive   Leuk Esterase: Trace / RBC: 0-2 /HPF / WBC 3-5 /HPF   Sq Epi: x / Non Sq Epi: Few / Bacteria: Few

## 2022-12-01 NOTE — PROGRESS NOTE ADULT - ASSESSMENT
52F with PMH of HTN , DM Type 2, STEPHANIE , obesity, uterine  fibroids presents to ED with 1 week of abdominal pain accompanied by vomiting and blood in the urine. Pain started 1 week prior and was initially located in the RUQ/RLQ, but had worsened and was diffuse. Pain described as stabbing and episodic; episodes lasted approx. 1 hour and were partially relieved by Tylenol/ibuprofen. Pt also reported first-time episode of blood in the urine, did not think it is vaginal bleeding. She reported no dysuria but reported increased urge. Pt unsure of LMP, stated she had irregular vaginal bleeding since initial fibroid diagnosis in 2015, sees Dr. Tillman outpatient and was referred to [facility] for a hysterectomy.  Pt endorsed multiple episodes of nonblood, nonbilious emesis, and 1 episode on Sunday along with decreased PO intake.  Patient now s/p exp lap, JOANNE BSO, abdominal washout with 2Liters pus drained and wound vac placed POD #1.    1. Large necrotic uterine fibroid with small foci of gas, with concern of infection ,   patient was tachycardic / hypotensive / t- max - 100.5   septic , Blood culture negative to date, Urine culture negative,   Continue Zosyn   ID following   S/p exp lap JOANNE BSO, abdominal washout 11/30/22, f/u pathology   Continue IVF   Trend WBC/Fever    2. STEPHANIE - due to sepsis - will d/c Metformin / Valsartan, improved  continue ivf , avoid nephrotoxic medications , strict I and O ,   follow BMP in am     3. NIDDM - a1c- 6.6 - well controlled -   continue to hold Metformin for now , ISSC , accu check , ADA when PO      4. Hx of HTN - slowly improving - continue to hold on Valsartan, continue BB postop Metoprolol     5. Hx of STEPHANIE / obesity - presently on 2 L NC - consider CPAP POST OP     6. Acute on chronic anemia - Hgb 8, will monitor CBC.      Will follow along with you .       52F with PMH of HTN , DM Type 2, STEPHANIE , obesity, uterine  fibroids presents to ED with 1 week of abdominal pain accompanied by vomiting and blood in the urine. Pain started 1 week prior and was initially located in the RUQ/RLQ, but had worsened and was diffuse. Pain described as stabbing and episodic; episodes lasted approx. 1 hour and were partially relieved by Tylenol/ibuprofen. Pt also reported first-time episode of blood in the urine, did not think it is vaginal bleeding. She reported no dysuria but reported increased urge. Pt unsure of LMP, stated she had irregular vaginal bleeding since initial fibroid diagnosis in 2015, sees Dr. Tillman outpatient and was referred to [facility] for a hysterectomy.  Pt endorsed multiple episodes of nonblood, nonbilious emesis, and 1 episode on Sunday along with decreased PO intake.  Patient now s/p exp lap, JOANNE BSO, abdominal washout with 2Liters pus drained and wound vac placed POD #1.    1. Large necrotic uterine fibroid with small foci of gas, with concern of infection ,   patient was tachycardic / hypotensive / t- max - 100.5   septic , Blood culture negative to date, Urine culture negative,   Continue Zosyn   ID following   S/p exp lap JOANNE BSO, abdominal washout 11/30/22, f/u pathology   Continue IVF   Trend WBC/Fever    2. STEPHANIE - due to sepsis - will d/c Metformin / Valsartan, improved  continue ivf , avoid nephrotoxic medications , strict I and O ,   follow BMP in am     3. NIDDM - a1c- 6.6 - well controlled -   continue to hold Metformin for now , ISSC , accu check , ADA when PO      4. Hx of HTN - slowly improving - continue to hold on Valsartan, continue BB postop Metoprolol     5. Hx of STEPHANIE / obesity - presently on 2 L NC - consider CPAP POST OP     6. Acute on chronic anemia - Hgb 8, will monitor CBC.    7. Chronic post nasal drip - will add claritin and flonase.    Will follow along with you .       52F with PMH of HTN , DM Type 2, STEPHANIE , obesity, uterine  fibroids presents to ED with 1 week of abdominal pain accompanied by vomiting and blood in the urine. Pain started 1 week prior and was initially located in the RUQ/RLQ, but had worsened and was diffuse. Pain described as stabbing and episodic; episodes lasted approx. 1 hour and were partially relieved by Tylenol/ibuprofen. Pt also reported first-time episode of blood in the urine, did not think it is vaginal bleeding. She reported no dysuria but reported increased urge. Pt unsure of LMP, stated she had irregular vaginal bleeding since initial fibroid diagnosis in 2015, sees Dr. Tillman outpatient and was referred to [facility] for a hysterectomy.  Pt endorsed multiple episodes of nonblood, nonbilious emesis, and 1 episode on Sunday along with decreased PO intake.  Patient now s/p exp lap, JOANNE BSO, abdominal washout with 2Liters pus drained and wound vac placed POD #1.    1. Large necrotic uterine fibroid with small foci of gas, with concern of infection ,   Patient with severe sepsis ( tachycardic/ hypotensive / fever / renal failure 0 on 11/30 -    s/p emergent ex lap with JOANNE and BSO   Abdomen filled with 2L of purulent ascites with necrotic ruptured uterine fibroid .   Sepsis resolving    Blood culture negative to date, Urine culture negative,   ID is following   Continue Zosyn , Vanco added    f/u pathology / OR cultures   Trend WBC/Fever      2. STEPHANIE - due to sepsis - Metformin / Valsartan on hold - improving   continue ivf , avoid nephrotoxic medications , strict I and O ,   follow BMP in am     3. NIDDM - a1c- 6.6 - well controlled -   continue to hold Metformin for now , ISSC , accu check , ADA when PO      4. Hx of HTN - slowly improving - continue to hold on Valsartan, continue BB postop Metoprolol   with parameters     5. Hx of STEPHANIE / obesity - this am on 2 L o2 ,   now saturating well on RA     6. Acute on chronic anemia - Hgb 8, patient with episode of dizziness ,   still tachy at 100's - consider to transfuse 1 unit PRBC , follow H/H in am     7. Chronic post nasal drip - will add claritin and flonase.    Will follow along with you .

## 2022-12-02 LAB
ANION GAP SERPL CALC-SCNC: 9 MMOL/L — SIGNIFICANT CHANGE UP (ref 5–17)
BASOPHILS # BLD AUTO: 0 K/UL — SIGNIFICANT CHANGE UP (ref 0–0.2)
BASOPHILS NFR BLD AUTO: 0 % — SIGNIFICANT CHANGE UP (ref 0–2)
BUN SERPL-MCNC: 13.7 MG/DL — SIGNIFICANT CHANGE UP (ref 8–20)
CALCIUM SERPL-MCNC: 8.5 MG/DL — SIGNIFICANT CHANGE UP (ref 8.4–10.5)
CHLORIDE SERPL-SCNC: 102 MMOL/L — SIGNIFICANT CHANGE UP (ref 96–108)
CO2 SERPL-SCNC: 26 MMOL/L — SIGNIFICANT CHANGE UP (ref 22–29)
CREAT SERPL-MCNC: 0.83 MG/DL — SIGNIFICANT CHANGE UP (ref 0.5–1.3)
EGFR: 84 ML/MIN/1.73M2 — SIGNIFICANT CHANGE UP
EOSINOPHIL # BLD AUTO: 0 K/UL — SIGNIFICANT CHANGE UP (ref 0–0.5)
EOSINOPHIL NFR BLD AUTO: 0 % — SIGNIFICANT CHANGE UP (ref 0–6)
GLUCOSE BLDC GLUCOMTR-MCNC: 102 MG/DL — HIGH (ref 70–99)
GLUCOSE BLDC GLUCOMTR-MCNC: 119 MG/DL — HIGH (ref 70–99)
GLUCOSE BLDC GLUCOMTR-MCNC: 158 MG/DL — HIGH (ref 70–99)
GLUCOSE BLDC GLUCOMTR-MCNC: 99 MG/DL — SIGNIFICANT CHANGE UP (ref 70–99)
GLUCOSE SERPL-MCNC: 108 MG/DL — HIGH (ref 70–99)
HCT VFR BLD CALC: 25.5 % — LOW (ref 34.5–45)
HGB BLD-MCNC: 8.1 G/DL — LOW (ref 11.5–15.5)
LYMPHOCYTES # BLD AUTO: 1.3 K/UL — SIGNIFICANT CHANGE UP (ref 1–3.3)
LYMPHOCYTES # BLD AUTO: 6.9 % — LOW (ref 13–44)
MAGNESIUM SERPL-MCNC: 2.2 MG/DL — SIGNIFICANT CHANGE UP (ref 1.6–2.6)
MANUAL SMEAR VERIFICATION: SIGNIFICANT CHANGE UP
MCHC RBC-ENTMCNC: 27.1 PG — SIGNIFICANT CHANGE UP (ref 27–34)
MCHC RBC-ENTMCNC: 31.8 GM/DL — LOW (ref 32–36)
MCV RBC AUTO: 85.3 FL — SIGNIFICANT CHANGE UP (ref 80–100)
MONOCYTES # BLD AUTO: 0.17 K/UL — SIGNIFICANT CHANGE UP (ref 0–0.9)
MONOCYTES NFR BLD AUTO: 0.9 % — LOW (ref 2–14)
NEUTROPHILS # BLD AUTO: 17.32 K/UL — HIGH (ref 1.8–7.4)
NEUTROPHILS NFR BLD AUTO: 92.2 % — HIGH (ref 43–77)
PHOSPHATE SERPL-MCNC: 3.3 MG/DL — SIGNIFICANT CHANGE UP (ref 2.4–4.7)
PLAT MORPH BLD: NORMAL — SIGNIFICANT CHANGE UP
PLATELET # BLD AUTO: 542 K/UL — HIGH (ref 150–400)
POTASSIUM SERPL-MCNC: 4.5 MMOL/L — SIGNIFICANT CHANGE UP (ref 3.5–5.3)
POTASSIUM SERPL-SCNC: 4.5 MMOL/L — SIGNIFICANT CHANGE UP (ref 3.5–5.3)
RBC # BLD: 2.99 M/UL — LOW (ref 3.8–5.2)
RBC # FLD: 16.5 % — HIGH (ref 10.3–14.5)
RBC BLD AUTO: NORMAL — SIGNIFICANT CHANGE UP
SODIUM SERPL-SCNC: 137 MMOL/L — SIGNIFICANT CHANGE UP (ref 135–145)
VANCOMYCIN TROUGH SERPL-MCNC: 11.3 UG/ML — SIGNIFICANT CHANGE UP (ref 10–20)
WBC # BLD: 18.78 K/UL — HIGH (ref 3.8–10.5)
WBC # FLD AUTO: 18.78 K/UL — HIGH (ref 3.8–10.5)

## 2022-12-02 PROCEDURE — 99233 SBSQ HOSP IP/OBS HIGH 50: CPT

## 2022-12-02 PROCEDURE — 71275 CT ANGIOGRAPHY CHEST: CPT | Mod: 26

## 2022-12-02 PROCEDURE — 99232 SBSQ HOSP IP/OBS MODERATE 35: CPT

## 2022-12-02 PROCEDURE — 93010 ELECTROCARDIOGRAM REPORT: CPT

## 2022-12-02 RX ORDER — DIPHENHYDRAMINE HCL 50 MG
50 CAPSULE ORAL ONCE
Refills: 0 | Status: COMPLETED | OUTPATIENT
Start: 2022-12-02 | End: 2022-12-02

## 2022-12-02 RX ORDER — DIPHENHYDRAMINE HCL 50 MG
25 CAPSULE ORAL ONCE
Refills: 0 | Status: DISCONTINUED | OUTPATIENT
Start: 2022-12-02 | End: 2022-12-02

## 2022-12-02 RX ADMIN — Medication 975 MILLIGRAM(S): at 11:49

## 2022-12-02 RX ADMIN — PIPERACILLIN AND TAZOBACTAM 25 GRAM(S): 4; .5 INJECTION, POWDER, LYOPHILIZED, FOR SOLUTION INTRAVENOUS at 22:19

## 2022-12-02 RX ADMIN — Medication 10 MILLIEQUIVALENT(S): at 11:50

## 2022-12-02 RX ADMIN — Medication 975 MILLIGRAM(S): at 00:50

## 2022-12-02 RX ADMIN — PIPERACILLIN AND TAZOBACTAM 25 GRAM(S): 4; .5 INJECTION, POWDER, LYOPHILIZED, FOR SOLUTION INTRAVENOUS at 06:00

## 2022-12-02 RX ADMIN — TIOTROPIUM BROMIDE 2 PUFF(S): 18 CAPSULE ORAL; RESPIRATORY (INHALATION) at 08:29

## 2022-12-02 RX ADMIN — Medication 1 SPRAY(S): at 17:22

## 2022-12-02 RX ADMIN — Medication 40 MILLIGRAM(S): at 15:48

## 2022-12-02 RX ADMIN — PIPERACILLIN AND TAZOBACTAM 25 GRAM(S): 4; .5 INJECTION, POWDER, LYOPHILIZED, FOR SOLUTION INTRAVENOUS at 13:48

## 2022-12-02 RX ADMIN — ENOXAPARIN SODIUM 40 MILLIGRAM(S): 100 INJECTION SUBCUTANEOUS at 22:20

## 2022-12-02 RX ADMIN — Medication 50 MILLIGRAM(S): at 20:01

## 2022-12-02 RX ADMIN — Medication 975 MILLIGRAM(S): at 17:21

## 2022-12-02 RX ADMIN — GABAPENTIN 300 MILLIGRAM(S): 400 CAPSULE ORAL at 13:47

## 2022-12-02 RX ADMIN — Medication 975 MILLIGRAM(S): at 01:00

## 2022-12-02 RX ADMIN — AMLODIPINE BESYLATE 10 MILLIGRAM(S): 2.5 TABLET ORAL at 05:56

## 2022-12-02 RX ADMIN — BUDESONIDE AND FORMOTEROL FUMARATE DIHYDRATE 2 PUFF(S): 160; 4.5 AEROSOL RESPIRATORY (INHALATION) at 08:29

## 2022-12-02 RX ADMIN — LORATADINE 10 MILLIGRAM(S): 10 TABLET ORAL at 11:50

## 2022-12-02 RX ADMIN — Medication 1 SPRAY(S): at 06:00

## 2022-12-02 RX ADMIN — Medication 25 MILLIGRAM(S): at 05:56

## 2022-12-02 RX ADMIN — PANTOPRAZOLE SODIUM 20 MILLIGRAM(S): 20 TABLET, DELAYED RELEASE ORAL at 11:50

## 2022-12-02 RX ADMIN — Medication 975 MILLIGRAM(S): at 17:22

## 2022-12-02 RX ADMIN — GABAPENTIN 300 MILLIGRAM(S): 400 CAPSULE ORAL at 05:59

## 2022-12-02 RX ADMIN — Medication 975 MILLIGRAM(S): at 06:29

## 2022-12-02 RX ADMIN — Medication 975 MILLIGRAM(S): at 05:59

## 2022-12-02 RX ADMIN — GABAPENTIN 300 MILLIGRAM(S): 400 CAPSULE ORAL at 22:20

## 2022-12-02 RX ADMIN — ENOXAPARIN SODIUM 40 MILLIGRAM(S): 100 INJECTION SUBCUTANEOUS at 11:49

## 2022-12-02 RX ADMIN — MONTELUKAST 10 MILLIGRAM(S): 4 TABLET, CHEWABLE ORAL at 22:20

## 2022-12-02 RX ADMIN — Medication 250 MILLIGRAM(S): at 02:44

## 2022-12-02 RX ADMIN — ATORVASTATIN CALCIUM 10 MILLIGRAM(S): 80 TABLET, FILM COATED ORAL at 22:20

## 2022-12-02 RX ADMIN — Medication 975 MILLIGRAM(S): at 13:44

## 2022-12-02 NOTE — CHART NOTE - NSCHARTNOTEFT_GEN_A_CORE
Patient seen at bedside. Patient OOB and sitting in chair with assistance. PT yet to see patient, they will see her today. Patient states pain well controlled. Denies CP, SOB, N/V, lightheadedness or dizziness.   During rounds, Saturation witnessed at 90% on RA. Supplemental O2 added again at 1 L.    Plan to ordered EKG and CT Angio w/ IV contrast to r/o PE. Patient to get premedicated with IV Benadryl.     Vital Signs Last 24 Hrs  T(C): 36.8 (02 Dec 2022 11:47), Max: 37.4 (02 Dec 2022 04:11)  T(F): 98.2 (02 Dec 2022 11:47), Max: 99.3 (02 Dec 2022 04:11)  HR: 88 (02 Dec 2022 11:47) (88 - 102)  BP: 106/66 (02 Dec 2022 11:47) (98/62 - 131/69)  BP(mean): --  RR: 18 (02 Dec 2022 11:47) (18 - 18)  SpO2: 93% (02 Dec 2022 11:47) (87% - 100%)    Parameters below as of 02 Dec 2022 11:47  Patient On (Oxygen Delivery Method): room air      Seen at bedside with Dr. Whaley Patient seen at bedside. Patient OOB and sitting in chair with assistance. PT yet to see patient, they will see her today. Patient states pain well controlled. Denies CP, SOB, N/V, lightheadedness or dizziness.   During rounds, Saturation witnessed at 90% on RA. Supplemental O2 added again at 1 L.    Plan to ordered EKG and CT Angio w/ IV contrast to r/o PE. Patient to get premedicated with IV Benadryl.     Vital Signs Last 24 Hrs  T(C): 36.8 (02 Dec 2022 11:47), Max: 37.4 (02 Dec 2022 04:11)  T(F): 98.2 (02 Dec 2022 11:47), Max: 99.3 (02 Dec 2022 04:11)  HR: 88 (02 Dec 2022 11:47) (88 - 102)  BP: 106/66 (02 Dec 2022 11:47) (98/62 - 131/69)  BP(mean): --  RR: 18 (02 Dec 2022 11:47) (18 - 18)  SpO2: 93% (02 Dec 2022 11:47) (87% - 100%)    Parameters below as of 02 Dec 2022 11:47  Patient On (Oxygen Delivery Method): room air      Seen at bedside with Dr. Whaley    __________    Attestation: EKG showing normal sinus rhythm with evidence of LV hypertrophy consistent with diagnosis of hypertension. Follow up results of CT PE, likely will be performed 2100 due to need for steroids prior    Ayesha Whaley PGY5

## 2022-12-02 NOTE — PROGRESS NOTE ADULT - ASSESSMENT
52F with PMH of HTN , DM Type 2, STEPHANIE , obesity, uterine  fibroids presents to ED with 1 week of abdominal pain accompanied by vomiting and blood in the urine. Pain started 1 week prior and was initially located in the RUQ/RLQ, but had worsened and was diffuse. Pain described as stabbing and episodic; episodes lasted approx. 1 hour and were partially relieved by Tylenol/ibuprofen. Pt also reported first-time episode of blood in the urine, did not think it is vaginal bleeding. She reported no dysuria but reported increased urge. Pt unsure of LMP, stated she had irregular vaginal bleeding since initial fibroid diagnosis in 2015, sees Dr. Tillman outpatient and was referred to [facility] for a hysterectomy.  Pt endorsed multiple episodes of nonblood, nonbilious emesis, and 1 episode on Sunday along with decreased PO intake.  Patient now s/p exp lap, JOANNE BSO, abdominal washout with 2Liters pus drained and wound vac placed POD #2.    1. Large necrotic uterine fibroid with small foci of gas, with concern of infection ,   Patient with severe sepsis ( tachycardic/ hypotensive / fever / renal failure ) on 11/30 -    s/p emergent ex lap with JOANNE and BSO   Abdomen filled with 2L of purulent ascites with necrotic ruptured uterine fibroid .   Sepsis resolving    Blood culture negative to date, Urine culture negative,   ID is following   Continue Zosyn , Vanco added    f/u pathology / OR cultures - growing Group B strep  Trend WBC/Fever      2. STEPHANIE - due to sepsis - Metformin / Valsartan on hold - resolved  continue ivf , avoid nephrotoxic medications , strict I and O ,   follow BMP in am     3. NIDDM - a1c- 6.6 - well controlled -   continue to hold Metformin for now , ISSC , accu check , ADA when PO      4. Hx of HTN - slowly improving - continue to hold on Valsartan, continue BB postop Metoprolol   with parameters     5. Hx of STEPHANIE / obesity -  now saturating well on RA     6. Acute on chronic anemia - Hgb 8  consider to transfuse 1 unit PRBC , follow H/H in am     7. Chronic post nasal drip - continue claritin and flonase.    Will follow along with you .       52F with PMH of HTN , DM Type 2, STEPHANIE , obesity, uterine  fibroids presents to ED with 1 week of abdominal pain accompanied by vomiting and blood in the urine. Pain started 1 week prior and was initially located in the RUQ/RLQ, but had worsened and was diffuse. Pain described as stabbing and episodic; episodes lasted approx. 1 hour and were partially relieved by Tylenol/ibuprofen. Pt also reported first-time episode of blood in the urine, did not think it is vaginal bleeding. She reported no dysuria but reported increased urge. Pt unsure of LMP, stated she had irregular vaginal bleeding since initial fibroid diagnosis in 2015, sees Dr. Tillman outpatient and was referred to [facility] for a hysterectomy.  Pt endorsed multiple episodes of nonblood, nonbilious emesis, and 1 episode on Sunday along with decreased PO intake.  Patient now s/p exp lap, JOANNE BSO, abdominal washout with 2Liters pus drained and wound vac placed POD #2.    1. Large necrotic uterine fibroid with small foci of gas, with concern of infection ,   Patient with severe sepsis ( tachycardic/ hypotensive / fever / renal failure ) on 11/30 -    s/p emergent ex lap with JOANNE and BSO   Abdomen filled with 2L of purulent ascites with necrotic ruptured uterine fibroid .   Sepsis resolving    Blood culture negative to date, Urine culture negative,   ID is following   Continue Zosyn , Vanco d/tracie    f/u pathology / OR cultures - growing Group B strep  Trend WBC/Fever      2. STEPHANIE - due to sepsis - Metformin / Valsartan on hold - resolved  continue ivf , avoid nephrotoxic medications , strict I and O ,   follow BMP in am     3. NIDDM - a1c- 6.6 - well controlled -   continue to hold Metformin for now , ISSC , accu check , ADA when PO      4. Hx of HTN - slowly improving - continue to hold on Valsartan, continue BB postop Metoprolol   with parameters     5. Hx of STEPHANIE / obesity/ Asthma - noted with episodes of desaturation down to 88% -   patient is asymptomatic - likely multifactorial due to Obesity / Hx of Asthma / ? STEPHANIE ,   LIKELY POSTOP ATELECTASIS   EKG - LVH otherwise normal - no sob/ no chest pain   Agree with CTA to r/o PE   U/S of LE to r/o dvt     6. Acute on chronic anemia - Hgb 8  consider to transfuse if Hg< 7.5 , follow H/H in am     7. Chronic post nasal drip - continue Claritin and Flonase    Will follow along with you .

## 2022-12-02 NOTE — PROGRESS NOTE ADULT - SUBJECTIVE AND OBJECTIVE BOX
GYNECOLOGIC ONCOLOGY PROGRESS NOTE    POD# 2  HOD# 4    PROBLEMS:  Sepsis  Uterine fibroid  Pelvic mass    T(F): 99.3 (12-02-22 @ 04:11), Max: 99.3 (12-02-22 @ 04:11)  HR: 100 (12-02-22 @ 04:11) (89 - 102)  BP: 131/69 (12-02-22 @ 04:11) (98/62 - 131/69)  RR: 18 (12-02-22 @ 04:11) (16 - 18)  SpO2: 92% (12-02-22 @ 04:11) (87% - 100%)  Wt(kg): -- is seen and examined at bedside.     SUBJECTIVE:    Patient endorses nausea/vomitting last night. Feels improved this morning, and has tolerated fruit.  Endorsed increased pain last night, controlled currently.   Flatus: denies   Denies shortness of breath, chest pain or dyspnea on exertion.  Tolerating diet.  Voiding spontaneously.   Ambulating.    OBJECTIVE:     VITALS:  T(F): 99.3 (12-02-22 @ 04:11), Max: 99.3 (12-02-22 @ 04:11)  HR: 100 (12-02-22 @ 04:11) (89 - 102)  BP: 131/69 (12-02-22 @ 04:11) (98/62 - 131/69)  RR: 18 (12-02-22 @ 04:11) (16 - 18)  SpO2: 92% (12-02-22 @ 04:11) (87% - 100%)  Wt(kg): --    I&O's Summary    01 Dec 2022 07:01  -  02 Dec 2022 07:00  --------------------------------------------------------  IN: 1480 mL / OUT: 2950 mL / NET: -1470 mL        MEDICATIONS  (STANDING):  acetaminophen     Tablet .. 975 milliGRAM(s) Oral every 6 hours  amLODIPine   Tablet 10 milliGRAM(s) Oral daily  atorvastatin 10 milliGRAM(s) Oral at bedtime  budesonide  80 MICROgram(s)/formoterol 4.5 MICROgram(s) Inhaler 2 Puff(s) Inhalation two times a day  dextrose 5%. 1000 milliLiter(s) (50 mL/Hr) IV Continuous <Continuous>  dextrose 5%. 1000 milliLiter(s) (100 mL/Hr) IV Continuous <Continuous>  dextrose 50% Injectable 25 Gram(s) IV Push once  dextrose 50% Injectable 12.5 Gram(s) IV Push once  dextrose 50% Injectable 25 Gram(s) IV Push once  enoxaparin Injectable 40 milliGRAM(s) SubCutaneous every 12 hours  fluticasone propionate 50 MICROgram(s)/spray Nasal Spray 1 Spray(s) Both Nostrils two times a day  gabapentin 300 milliGRAM(s) Oral every 8 hours  glucagon  Injectable 1 milliGRAM(s) IntraMuscular once  insulin lispro (ADMELOG) corrective regimen sliding scale   SubCutaneous three times a day before meals  insulin lispro (ADMELOG) corrective regimen sliding scale   SubCutaneous at bedtime  lactated ringers Bolus 1000 milliLiter(s) IV Bolus once  loratadine 10 milliGRAM(s) Oral daily  metoprolol succinate ER 25 milliGRAM(s) Oral daily  montelukast 10 milliGRAM(s) Oral at bedtime  pantoprazole    Tablet 20 milliGRAM(s) Oral daily  piperacillin/tazobactam IVPB.. 3.375 Gram(s) IV Intermittent every 8 hours  potassium chloride    Tablet ER 10 milliEquivalent(s) Oral daily  tiotropium 2.5 MICROgram(s) Inhaler 2 Puff(s) Inhalation daily  vancomycin  IVPB 750 milliGRAM(s) IV Intermittent every 12 hours    MEDICATIONS  (PRN):  acetaminophen     Tablet .. 975 milliGRAM(s) Oral every 6 hours PRN Mild Pain (1 - 3)  albuterol    90 MICROgram(s) HFA Inhaler 2 Puff(s) Inhalation every 6 hours PRN Shortness of Breath and/or Wheezing  dextrose Oral Gel 15 Gram(s) Oral once PRN Blood Glucose LESS THAN 70 milliGRAM(s)/deciliter  mesalamine Suppository 1000 milliGRAM(s) Rectal at bedtime PRN ulcerative colitis  naloxone Injectable 0.1 milliGRAM(s) IV Push every 3 minutes PRN For ANY of the following changes in patient status:  A. RR LESS THAN 10 breaths per minute, B. Oxygen saturation LESS THAN 90%, C. Sedation score of 6  ondansetron    Tablet 8 milliGRAM(s) Oral every 8 hours PRN Nausea and/or Vomiting  ondansetron Injectable 4 milliGRAM(s) IV Push every 6 hours PRN Nausea  oxyCODONE    IR 5 milliGRAM(s) Oral every 3 hours PRN Moderate Pain (4 - 6)  oxyCODONE    IR 10 milliGRAM(s) Oral every 3 hours PRN Severe Pain (7 - 10)  simethicone 80 milliGRAM(s) Chew every 6 hours PRN Gas  sodium chloride 0.65% Nasal 1 Spray(s) Both Nostrils every 1 hour PRN Nasal Congestion      Physical Exam:  Constitutional: NAD  Pulmonary: clear to auscultation bilaterally   Cardiovascular: Regular rate and rhythm   Abdomen: soft, non-tender, mildly distended, normal bowel sounds  Incision: Vertical incision with staples. Clean, dry, intact.  Prevena dressing in place, with good suction. Without signs of infection or hernia.  Extremities: B/l LE and UE edema +1, denies calve tenderness, Guillermo's sign negative.    LABS:                        8.1    18.78 )-----------( 542      ( 02 Dec 2022 06:09 )             25.5     12-01    134<L>  |  100  |  24.6<H>  ----------------------------<  138<H>  4.8   |  22.0  |  1.23    Ca    8.2<L>      01 Dec 2022 05:45  Phos  4.2     12-01  Mg     1.9     12-01    TPro  5.9<L>  /  Alb  1.8<L>  /  TBili  0.5  /  DBili  x   /  AST  27  /  ALT  16  /  AlkPhos  58  12-01    PT/INR - ( 30 Nov 2022 16:35 )   PT: 17.5 sec;   INR: 1.50 ratio         PTT - ( 30 Nov 2022 16:35 )  PTT:25.8 sec           GYNECOLOGIC ONCOLOGY PROGRESS NOTE    POD# 2  HOD# 4    PROBLEMS:  Sepsis  Uterine fibroid  Pelvic mass    T(F): 99.3 (12-02-22 @ 04:11), Max: 99.3 (12-02-22 @ 04:11)  HR: 100 (12-02-22 @ 04:11) (89 - 102)  BP: 131/69 (12-02-22 @ 04:11) (98/62 - 131/69)  RR: 18 (12-02-22 @ 04:11) (16 - 18)  SpO2: 92% (12-02-22 @ 04:11) (87% - 100%)  Wt(kg): -- is seen and examined at bedside.     SUBJECTIVE:    Patient endorses nausea/vomitting last night. Feels improved this morning, and has tolerated fruit.  Endorsed increased pain last night, controlled currently.   Flatus: denies   Denies shortness of breath, chest pain or dyspnea on exertion.  Tolerating diet.  Voiding spontaneously.   Ambulating.    OBJECTIVE:     VITALS:  T(F): 99.3 (12-02-22 @ 04:11), Max: 99.3 (12-02-22 @ 04:11)  HR: 100 (12-02-22 @ 04:11) (89 - 102)  BP: 131/69 (12-02-22 @ 04:11) (98/62 - 131/69)  RR: 18 (12-02-22 @ 04:11) (16 - 18)  SpO2: 92% (12-02-22 @ 04:11) (87% - 100%) on 1L NC  Wt(kg): --    I&O's Summary    01 Dec 2022 07:01  -  02 Dec 2022 07:00  --------------------------------------------------------  IN: 1480 mL / OUT: 2950 mL / NET: -1470 mL        MEDICATIONS  (STANDING):  acetaminophen     Tablet .. 975 milliGRAM(s) Oral every 6 hours  amLODIPine   Tablet 10 milliGRAM(s) Oral daily  atorvastatin 10 milliGRAM(s) Oral at bedtime  budesonide  80 MICROgram(s)/formoterol 4.5 MICROgram(s) Inhaler 2 Puff(s) Inhalation two times a day  dextrose 5%. 1000 milliLiter(s) (50 mL/Hr) IV Continuous <Continuous>  dextrose 5%. 1000 milliLiter(s) (100 mL/Hr) IV Continuous <Continuous>  dextrose 50% Injectable 25 Gram(s) IV Push once  dextrose 50% Injectable 12.5 Gram(s) IV Push once  dextrose 50% Injectable 25 Gram(s) IV Push once  enoxaparin Injectable 40 milliGRAM(s) SubCutaneous every 12 hours  fluticasone propionate 50 MICROgram(s)/spray Nasal Spray 1 Spray(s) Both Nostrils two times a day  gabapentin 300 milliGRAM(s) Oral every 8 hours  glucagon  Injectable 1 milliGRAM(s) IntraMuscular once  insulin lispro (ADMELOG) corrective regimen sliding scale   SubCutaneous three times a day before meals  insulin lispro (ADMELOG) corrective regimen sliding scale   SubCutaneous at bedtime  lactated ringers Bolus 1000 milliLiter(s) IV Bolus once  loratadine 10 milliGRAM(s) Oral daily  metoprolol succinate ER 25 milliGRAM(s) Oral daily  montelukast 10 milliGRAM(s) Oral at bedtime  pantoprazole    Tablet 20 milliGRAM(s) Oral daily  piperacillin/tazobactam IVPB.. 3.375 Gram(s) IV Intermittent every 8 hours  potassium chloride    Tablet ER 10 milliEquivalent(s) Oral daily  tiotropium 2.5 MICROgram(s) Inhaler 2 Puff(s) Inhalation daily  vancomycin  IVPB 750 milliGRAM(s) IV Intermittent every 12 hours    MEDICATIONS  (PRN):  acetaminophen     Tablet .. 975 milliGRAM(s) Oral every 6 hours PRN Mild Pain (1 - 3)  albuterol    90 MICROgram(s) HFA Inhaler 2 Puff(s) Inhalation every 6 hours PRN Shortness of Breath and/or Wheezing  dextrose Oral Gel 15 Gram(s) Oral once PRN Blood Glucose LESS THAN 70 milliGRAM(s)/deciliter  mesalamine Suppository 1000 milliGRAM(s) Rectal at bedtime PRN ulcerative colitis  naloxone Injectable 0.1 milliGRAM(s) IV Push every 3 minutes PRN For ANY of the following changes in patient status:  A. RR LESS THAN 10 breaths per minute, B. Oxygen saturation LESS THAN 90%, C. Sedation score of 6  ondansetron    Tablet 8 milliGRAM(s) Oral every 8 hours PRN Nausea and/or Vomiting  ondansetron Injectable 4 milliGRAM(s) IV Push every 6 hours PRN Nausea  oxyCODONE    IR 5 milliGRAM(s) Oral every 3 hours PRN Moderate Pain (4 - 6)  oxyCODONE    IR 10 milliGRAM(s) Oral every 3 hours PRN Severe Pain (7 - 10)  simethicone 80 milliGRAM(s) Chew every 6 hours PRN Gas  sodium chloride 0.65% Nasal 1 Spray(s) Both Nostrils every 1 hour PRN Nasal Congestion      Physical Exam:  Constitutional: NAD  Pulmonary: clear to auscultation bilaterally   Cardiovascular: Regular rate and rhythm   Abdomen: soft, non-tender, mildly distended, normal bowel sounds  Incision: Vertical incision with staples. Clean, dry, intact.  Prevena dressing in place, with good suction. Without signs of infection or hernia.  Extremities: B/l LE and UE edema +1, denies calve tenderness, Guillermo's sign negative.    LABS:                        8.1    18.78 )-----------( 542      ( 02 Dec 2022 06:09 )             25.5     12-01    134<L>  |  100  |  24.6<H>  ----------------------------<  138<H>  4.8   |  22.0  |  1.23    Ca    8.2<L>      01 Dec 2022 05:45  Phos  4.2     12-01  Mg     1.9     12-01    TPro  5.9<L>  /  Alb  1.8<L>  /  TBili  0.5  /  DBili  x   /  AST  27  /  ALT  16  /  AlkPhos  58  12-01    PT/INR - ( 30 Nov 2022 16:35 )   PT: 17.5 sec;   INR: 1.50 ratio         PTT - ( 30 Nov 2022 16:35 )  PTT:25.8 sec           GYNECOLOGIC ONCOLOGY PROGRESS NOTE    POD# 2  HOD# 4    PROBLEMS:  Sepsis  Uterine fibroid  Pelvic mass    T(F): 99.3 (12-02-22 @ 04:11), Max: 99.3 (12-02-22 @ 04:11)  HR: 100 (12-02-22 @ 04:11) (89 - 102)  BP: 131/69 (12-02-22 @ 04:11) (98/62 - 131/69)  RR: 18 (12-02-22 @ 04:11) (16 - 18)  SpO2: 92% (12-02-22 @ 04:11) (87% - 100%)  Wt(kg): -- is seen and examined at bedside.     SUBJECTIVE:    Patient endorses nausea/vomitting last night. Feels improved this morning, and has tolerated fruit.  Endorsed increased pain last night, controlled currently.   Flatus: endorses  Denies shortness of breath, chest pain or dyspnea on exertion.  Tolerating diet.  Voiding spontaneously.   Ambulating.    OBJECTIVE:     VITALS:  T(F): 99.3 (12-02-22 @ 04:11), Max: 99.3 (12-02-22 @ 04:11)  HR: 100 (12-02-22 @ 04:11) (89 - 102)  BP: 131/69 (12-02-22 @ 04:11) (98/62 - 131/69)  RR: 18 (12-02-22 @ 04:11) (16 - 18)  SpO2: 92% (12-02-22 @ 04:11) (87% - 100%) on 1L NC  Wt(kg): --    I&O's Summary    01 Dec 2022 07:01  -  02 Dec 2022 07:00  --------------------------------------------------------  IN: 1480 mL / OUT: 2950 mL / NET: -1470 mL        MEDICATIONS  (STANDING):  acetaminophen     Tablet .. 975 milliGRAM(s) Oral every 6 hours  amLODIPine   Tablet 10 milliGRAM(s) Oral daily  atorvastatin 10 milliGRAM(s) Oral at bedtime  budesonide  80 MICROgram(s)/formoterol 4.5 MICROgram(s) Inhaler 2 Puff(s) Inhalation two times a day  dextrose 5%. 1000 milliLiter(s) (50 mL/Hr) IV Continuous <Continuous>  dextrose 5%. 1000 milliLiter(s) (100 mL/Hr) IV Continuous <Continuous>  dextrose 50% Injectable 25 Gram(s) IV Push once  dextrose 50% Injectable 12.5 Gram(s) IV Push once  dextrose 50% Injectable 25 Gram(s) IV Push once  enoxaparin Injectable 40 milliGRAM(s) SubCutaneous every 12 hours  fluticasone propionate 50 MICROgram(s)/spray Nasal Spray 1 Spray(s) Both Nostrils two times a day  gabapentin 300 milliGRAM(s) Oral every 8 hours  glucagon  Injectable 1 milliGRAM(s) IntraMuscular once  insulin lispro (ADMELOG) corrective regimen sliding scale   SubCutaneous three times a day before meals  insulin lispro (ADMELOG) corrective regimen sliding scale   SubCutaneous at bedtime  lactated ringers Bolus 1000 milliLiter(s) IV Bolus once  loratadine 10 milliGRAM(s) Oral daily  metoprolol succinate ER 25 milliGRAM(s) Oral daily  montelukast 10 milliGRAM(s) Oral at bedtime  pantoprazole    Tablet 20 milliGRAM(s) Oral daily  piperacillin/tazobactam IVPB.. 3.375 Gram(s) IV Intermittent every 8 hours  potassium chloride    Tablet ER 10 milliEquivalent(s) Oral daily  tiotropium 2.5 MICROgram(s) Inhaler 2 Puff(s) Inhalation daily  vancomycin  IVPB 750 milliGRAM(s) IV Intermittent every 12 hours    MEDICATIONS  (PRN):  acetaminophen     Tablet .. 975 milliGRAM(s) Oral every 6 hours PRN Mild Pain (1 - 3)  albuterol    90 MICROgram(s) HFA Inhaler 2 Puff(s) Inhalation every 6 hours PRN Shortness of Breath and/or Wheezing  dextrose Oral Gel 15 Gram(s) Oral once PRN Blood Glucose LESS THAN 70 milliGRAM(s)/deciliter  mesalamine Suppository 1000 milliGRAM(s) Rectal at bedtime PRN ulcerative colitis  naloxone Injectable 0.1 milliGRAM(s) IV Push every 3 minutes PRN For ANY of the following changes in patient status:  A. RR LESS THAN 10 breaths per minute, B. Oxygen saturation LESS THAN 90%, C. Sedation score of 6  ondansetron    Tablet 8 milliGRAM(s) Oral every 8 hours PRN Nausea and/or Vomiting  ondansetron Injectable 4 milliGRAM(s) IV Push every 6 hours PRN Nausea  oxyCODONE    IR 5 milliGRAM(s) Oral every 3 hours PRN Moderate Pain (4 - 6)  oxyCODONE    IR 10 milliGRAM(s) Oral every 3 hours PRN Severe Pain (7 - 10)  simethicone 80 milliGRAM(s) Chew every 6 hours PRN Gas  sodium chloride 0.65% Nasal 1 Spray(s) Both Nostrils every 1 hour PRN Nasal Congestion      Physical Exam:  Constitutional: NAD  Pulmonary: clear to auscultation bilaterally   Cardiovascular: Regular rate and rhythm   Abdomen: soft, non-tender, mildly distended, normal bowel sounds  Incision: Vertical incision with staples. Clean, dry, intact.  Prevena dressing in place, with good suction. Without signs of infection or hernia.  Extremities: B/l LE and UE edema +1, denies calve tenderness, Guillermo's sign negative.    LABS:                        8.1    18.78 )-----------( 542      ( 02 Dec 2022 06:09 )             25.5     12-01    134<L>  |  100  |  24.6<H>  ----------------------------<  138<H>  4.8   |  22.0  |  1.23    Ca    8.2<L>      01 Dec 2022 05:45  Phos  4.2     12-01  Mg     1.9     12-01    TPro  5.9<L>  /  Alb  1.8<L>  /  TBili  0.5  /  DBili  x   /  AST  27  /  ALT  16  /  AlkPhos  58  12-01    PT/INR - ( 30 Nov 2022 16:35 )   PT: 17.5 sec;   INR: 1.50 ratio         PTT - ( 30 Nov 2022 16:35 )  PTT:25.8 sec

## 2022-12-02 NOTE — PROGRESS NOTE ADULT - ASSESSMENT
52y  Female with h/o uterine fibroids presented to ED 11/29 with 1 week of diffuse abdominal pain. Patient reports pain was intermittent for 1 week prior to presentation with flare ups on 11/17, 11/20 and then 11/29. Pain initially located in the RUQ/RLQ, and worsened and is now diffuse. Pain is described as stabbing and episodic; episodes last approx. 1 hour and are partially relieved by Tylenol/ibuprofen. Associated with episode of blood in the urine on 11/30. She reports no dysuria but reports increased urge. Patient with hx of irregular vaginal bleeding since 2015. States menstrual cycle may last 7-10 day and not consistently every month. This has been since initial fibroid diagnosis in 2015. Pt sees Dr. Tillman outpatient and was referred to GYN [facility] for a hysterectomy in May 2022 for finding of 18 wk size large fibroid uterus, but was unable to followup due to insurance issues.  In the ED, patient was found to be febrile 100.6 and worked up for Sepsis due to tachycardia and hypotension. She received 1L fluid bolus x2. S/p Zosyn q8, Vancomycin x1, Zofran; Urine, blood cx collected. CT abd/pelvis obtained showing: Markedly abnormal and enlarged uterus with suspected necrosis of a 16.3 cm right fundal uterine mass, uterus 24 x18 x18 on imaging. The mass contains small foci of gas, raising concern for superimposed infection. Abnormal small to moderate amount of abdominal and pelvic ascites with anterior peritoneal soft tissue stranding. Continued on Zosyn.       Infection of Necrotic uterine mass with gas  s/p exp lap, JOANNE BSO, abdominal washout with 2Liters pus drained and wound vac placed 11/30  Fever  Leukocytosis   STEPHANIE      - Blood cultures 11/29 no growth  - RVP/COVID 19 PCR 11/30 negative   - Surgical culture reporting  Streptococcus agalactiae  - CT abd/pelvis obtained showing: Markedly abnormal and enlarged uterus with suspected necrosis of a 16.3 cm right fundal uterine mass, uterus 24 x18 x18 on imaging. The mass contains small foci of gas, raising concern for superimposed infection. Abnormal small to moderate amount of abdominal and pelvic ascites with anterior peritoneal soft tissue stranding.  - UA negative for UTI   - Continue Zosyn  - D/C Vancomycin  - s/p exp lap, JOANNE BSO, abdominal washout with 2Liters pus drained and wound vac placed 11/30  - GYN following   - Follow up cultures  - Trend Fever  - Trend WBC      Will Follow    d/w Clinical pharmacy and RN

## 2022-12-02 NOTE — PROGRESS NOTE ADULT - SUBJECTIVE AND OBJECTIVE BOX
[facility]  INFECTIOUS DISEASES at [place] and [place]  =======================================================                               Tien Moore MD#   Camille Bateman MD*                             Antonia Mixon MD*   Yola Redmond MD*            Diplomates American Board of Internal Medicine & Infectious Diseases                # [place] Office - Appt - Tel  [phone] Fax [phone]                * [place] Office - Appt - Tel [phone] Fax [phone]                                  Hospital Consult line:  [phone]  =======================================================    LORENA DUNCAN [id number]    Follow up: Pelvic infection    s/p exp lap, JOANNE BSO, abdominal washout with 2Liters pus drained and wound vac placed 11/30      Allergies:  codeine (Nausea)  IV Contrast (Other; Pruritus; Hives)       REVIEW OF SYSTEMS:  CONSTITUTIONAL:  No Fever or chills  HEENT:  No diplopia or blurred vision.  No earache, sore throat or runny nose.  CARDIOVASCULAR:  No chest pain  RESPIRATORY:  No cough, shortness of breath  GASTROINTESTINAL:  No nausea, vomiting or diarrhea. + Abd pain   GENITOURINARY:  No dysuria, frequency or urgency. + Blood in urine  MUSCULOSKELETAL:  no joint aches, no muscle pain  SKIN:  No change in skin, hair or nails.  NEUROLOGIC:  No Headaches, seizures  PSYCHIATRIC:  No disorder of thought or mood.  ENDOCRINE:  No heat or cold intolerance  HEMATOLOGICAL:  No easy bruising or bleeding.       Physical Exam:  GEN: NAD  HEENT: normocephalic and atraumatic. EOMI. PERRL.    NECK: Supple.   LUNGS: CTA B/L.  HEART: RRR  ABDOMEN: Soft, Obese.  +BS.  Diffuse tenderness, No rebound.   : No CVA tenderness  EXTREMITIES: Without  edema.  MSK: No joint swelling  NEUROLOGIC: No Focal Deficits   PSYCHIATRIC: Appropriate affect .  SKIN: No rash        Vitals:  T(F): 98.2 (02 Dec 2022 11:47), Max: 99.3 (02 Dec 2022 04:11)  HR: 88 (02 Dec 2022 11:47)  BP: 106/66 (02 Dec 2022 11:47)  RR: 18 (02 Dec 2022 11:47)  SpO2: 93% (02 Dec 2022 11:47) (87% - 100%)  temp max in last 48H T(F): , Max: 99.3 (12-02-22 @ 04:11)    Current Antibiotics:  piperacillin/tazobactam IVPB.. 3.375 Gram(s) IV Intermittent every 8 hours  vancomycin  IVPB 750 milliGRAM(s) IV Intermittent every 12 hours    Other medications:  acetaminophen     Tablet .. 975 milliGRAM(s) Oral every 6 hours  amLODIPine   Tablet 10 milliGRAM(s) Oral daily  atorvastatin 10 milliGRAM(s) Oral at bedtime  budesonide  80 MICROgram(s)/formoterol 4.5 MICROgram(s) Inhaler 2 Puff(s) Inhalation two times a day  dextrose 5%. 1000 milliLiter(s) IV Continuous <Continuous>  dextrose 5%. 1000 milliLiter(s) IV Continuous <Continuous>  dextrose 50% Injectable 25 Gram(s) IV Push once  dextrose 50% Injectable 12.5 Gram(s) IV Push once  dextrose 50% Injectable 25 Gram(s) IV Push once  diphenhydrAMINE Injectable 25 milliGRAM(s) IV Push once  enoxaparin Injectable 40 milliGRAM(s) SubCutaneous every 12 hours  fluticasone propionate 50 MICROgram(s)/spray Nasal Spray 1 Spray(s) Both Nostrils two times a day  gabapentin 300 milliGRAM(s) Oral every 8 hours  glucagon  Injectable 1 milliGRAM(s) IntraMuscular once  insulin lispro (ADMELOG) corrective regimen sliding scale   SubCutaneous three times a day before meals  insulin lispro (ADMELOG) corrective regimen sliding scale   SubCutaneous at bedtime  lactated ringers Bolus 1000 milliLiter(s) IV Bolus once  loratadine 10 milliGRAM(s) Oral daily  metoprolol succinate ER 25 milliGRAM(s) Oral daily  montelukast 10 milliGRAM(s) Oral at bedtime  pantoprazole    Tablet 20 milliGRAM(s) Oral daily  potassium chloride    Tablet ER 10 milliEquivalent(s) Oral daily  tiotropium 2.5 MICROgram(s) Inhaler 2 Puff(s) Inhalation daily                            8.1    18.78 )-----------( 542      ( 02 Dec 2022 06:09 )             25.5     12-02    137  |  102  |  13.7  ----------------------------<  108<H>  4.5   |  26.0  |  0.83    Ca    8.5      02 Dec 2022 06:09  Phos  3.3     12-02  Mg     2.2     12-02    TPro  5.9<L>  /  Alb  1.8<L>  /  TBili  0.5  /  DBili  x   /  AST  27  /  ALT  16  /  AlkPhos  58  12-01      RECENT CULTURES:  11-30 @ 19:00 .Surgical Swab Uterine Culture     Moderate Streptococcus agalactiae (Group B)  Streptococcus agalactiae (Group B) isolated  Group B streptococci are susceptible to ampicillin,  penicillin and cefazolin, but may be resistant to  erythromycin and clindamycin.  Recommendations for intrapartum prophylaxis for Group B  streptococci are penicillin or ampicillin.    11-30 @ 02:59    RVP  NotDetec    11-29 @ 19:02 Clean Catch Clean Catch (Midstream)     <10,000 CFU/mL Normal Urogenital Vivian    11-29 @ 12:45 .Blood Blood     No growth to date.    11-29 @ 12:35 .Blood Blood     No growth to date.      WBC Count: 18.78 K/uL (12-02-22 @ 06:09)  WBC Count: 16.00 K/uL (12-01-22 @ 05:45)  WBC Count: 18.12 K/uL (11-30-22 @ 16:35)  WBC Count: 16.86 K/uL (11-30-22 @ 07:50)  WBC Count: 7.06 K/uL (11-29-22 @ 10:25)    Creatinine, Serum: 0.83 mg/dL (12-02-22 @ 06:09)  Creatinine, Serum: 1.23 mg/dL (12-01-22 @ 05:45)  Creatinine, Serum: 1.95 mg/dL (11-30-22 @ 13:15)  Creatinine, Serum: 1.88 mg/dL (11-30-22 @ 07:50)  Creatinine, Serum: 0.92 mg/dL (11-29-22 @ 10:25)     SARS-CoV-2: NotDetec (11-30-22 @ 02:59)

## 2022-12-02 NOTE — PROGRESS NOTE ADULT - SUBJECTIVE AND OBJECTIVE BOX
CC: Diffuse abdominal pain (02 Dec 2022 07:01)    HPI:  52F with PMH of uterine fibroids, DM2, HTN, asthma presents to ED with 1 week of abdominal pain accompanied by vomiting and blood in the urine.     INTERVAL HPI/OVERNIGHT EVENTS:  Patient seen and examined sitting up in the chair.  Patient reports incisional pain controlled with current regimen.  Patient denies any headache, dizziness, SOB, CP, nausea, vomiting, dysuria.  No bm, (+) flatus.  Other ROS reviewed and are negative.    Vital Signs Last 24 Hrs  T(C): 36.8 (02 Dec 2022 11:47), Max: 37.4 (02 Dec 2022 04:11)  T(F): 98.2 (02 Dec 2022 11:47), Max: 99.3 (02 Dec 2022 04:11)  HR: 88 (02 Dec 2022 11:47) (88 - 102)  BP: 106/66 (02 Dec 2022 11:47) (98/62 - 131/69)  BP(mean): --  RR: 18 (02 Dec 2022 11:47) (18 - 18)  SpO2: 93% (02 Dec 2022 11:47) (87% - 100%)    Parameters below as of 02 Dec 2022 11:47  Patient On (Oxygen Delivery Method): room air      I&O's Detail    01 Dec 2022 07:01  -  02 Dec 2022 07:00  --------------------------------------------------------  IN:    IV PiggyBack: 250 mL    IV PiggyBack: 150 mL    Oral Fluid: 1080 mL  Total IN: 1480 mL    OUT:    Voided (mL): 2950 mL  Total OUT: 2950 mL    Total NET: -1470 mL      02 Dec 2022 07:01  -  02 Dec 2022 12:36  --------------------------------------------------------  IN:    Oral Fluid: 480 mL  Total IN: 480 mL    OUT:  Total OUT: 0 mL    Total NET: 480 mL      PHYSICAL EXAM:  GENERAL: NAD  HEAD:  Atraumatic, Normocephalic  EYES: EOMI, PERRLA, conjunctiva and sclera clear  NECK: Supple, No JVD, Normal thyroid  NERVOUS SYSTEM:  Alert & Oriented X3, Good concentration; Motor Strength 5/5 B/L upper and lower extremities  CHEST/LUNG: Clear to auscultation bilaterally  HEART: Regular rate and rhythm; No murmurs, rubs, or gallops  ABDOMEN: Soft, tenderness at incision, Nondistended; Bowel sounds present, midline incision with VAC in place  EXTREMITIES:  2+ Peripheral Pulses, No clubbing, cyanosis, or edema  SKIN: No rashes or lesions                                8.1    18.78 )-----------( 542      ( 02 Dec 2022 06:09 )             25.5     02 Dec 2022 06:09    137    |  102    |  13.7   ----------------------------<  108    4.5     |  26.0   |  0.83     Ca    8.5        02 Dec 2022 06:09  Phos  3.3       02 Dec 2022 06:09  Mg     2.2       02 Dec 2022 06:09    TPro  5.9    /  Alb  1.8    /  TBili  0.5    /  DBili  x      /  AST  27     /  ALT  16     /  AlkPhos  58     01 Dec 2022 05:45    PT/INR - ( 30 Nov 2022 16:35 )   PT: 17.5 sec;   INR: 1.50 ratio         PTT - ( 30 Nov 2022 16:35 )  PTT:25.8 sec    CAPILLARY BLOOD GLUCOSE  POCT Blood Glucose.: 99 mg/dL (02 Dec 2022 12:34)  POCT Blood Glucose.: 102 mg/dL (02 Dec 2022 09:12)  POCT Blood Glucose.: 114 mg/dL (01 Dec 2022 22:33)  POCT Blood Glucose.: 108 mg/dL (01 Dec 2022 17:36)    LIVER FUNCTIONS - ( 01 Dec 2022 05:45 )  Alb: 1.8 g/dL / Pro: 5.9 g/dL / ALK PHOS: 58 U/L / ALT: 16 U/L / AST: 27 U/L / GGT: x           Culture - Surgical Swab (11.30.22 @ 19:00)    Specimen Source: .Surgical Swab Uterine Culture    Culture Results:   Moderate Streptococcus agalactiae (Group B)  Streptococcus agalactiae (Group B) isolated  Group B streptococci are susceptible to ampicillin,  penicillin and cefazolin, but may be resistant to  erythromycin and clindamycin.  Recommendations for intrapartum prophylaxis for Group B  streptococci are penicillin or ampicillin.    Culture - Urine (11.29.22 @ 19:02)    Specimen Source: Clean Catch Clean Catch (Midstream)    Culture Results:   <10,000 CFU/mL Normal Urogenital Vivian    Culture - Blood (11.29.22 @ 12:45)    Specimen Source: .Blood Blood    Culture Results:   No growth to date.    Culture - Blood (11.29.22 @ 12:35)    Specimen Source: .Blood Blood    Culture Results:   No growth to date.    MEDICATIONS  (STANDING):  acetaminophen     Tablet .. 975 milliGRAM(s) Oral every 6 hours  amLODIPine   Tablet 10 milliGRAM(s) Oral daily  atorvastatin 10 milliGRAM(s) Oral at bedtime  budesonide  80 MICROgram(s)/formoterol 4.5 MICROgram(s) Inhaler 2 Puff(s) Inhalation two times a day  dextrose 5%. 1000 milliLiter(s) (50 mL/Hr) IV Continuous <Continuous>  dextrose 5%. 1000 milliLiter(s) (100 mL/Hr) IV Continuous <Continuous>  dextrose 50% Injectable 25 Gram(s) IV Push once  dextrose 50% Injectable 12.5 Gram(s) IV Push once  dextrose 50% Injectable 25 Gram(s) IV Push once  enoxaparin Injectable 40 milliGRAM(s) SubCutaneous every 12 hours  fluticasone propionate 50 MICROgram(s)/spray Nasal Spray 1 Spray(s) Both Nostrils two times a day  gabapentin 300 milliGRAM(s) Oral every 8 hours  glucagon  Injectable 1 milliGRAM(s) IntraMuscular once  insulin lispro (ADMELOG) corrective regimen sliding scale   SubCutaneous three times a day before meals  insulin lispro (ADMELOG) corrective regimen sliding scale   SubCutaneous at bedtime  lactated ringers Bolus 1000 milliLiter(s) IV Bolus once  loratadine 10 milliGRAM(s) Oral daily  metoprolol succinate ER 25 milliGRAM(s) Oral daily  montelukast 10 milliGRAM(s) Oral at bedtime  pantoprazole    Tablet 20 milliGRAM(s) Oral daily  piperacillin/tazobactam IVPB.. 3.375 Gram(s) IV Intermittent every 8 hours  potassium chloride    Tablet ER 10 milliEquivalent(s) Oral daily  tiotropium 2.5 MICROgram(s) Inhaler 2 Puff(s) Inhalation daily  vancomycin  IVPB 750 milliGRAM(s) IV Intermittent every 12 hours    MEDICATIONS  (PRN):  acetaminophen     Tablet .. 975 milliGRAM(s) Oral every 6 hours PRN Mild Pain (1 - 3)  albuterol    90 MICROgram(s) HFA Inhaler 2 Puff(s) Inhalation every 6 hours PRN Shortness of Breath and/or Wheezing  dextrose Oral Gel 15 Gram(s) Oral once PRN Blood Glucose LESS THAN 70 milliGRAM(s)/deciliter  mesalamine Suppository 1000 milliGRAM(s) Rectal at bedtime PRN ulcerative colitis  naloxone Injectable 0.1 milliGRAM(s) IV Push every 3 minutes PRN For ANY of the following changes in patient status:  A. RR LESS THAN 10 breaths per minute, B. Oxygen saturation LESS THAN 90%, C. Sedation score of 6  ondansetron    Tablet 8 milliGRAM(s) Oral every 8 hours PRN Nausea and/or Vomiting  ondansetron Injectable 4 milliGRAM(s) IV Push every 6 hours PRN Nausea  oxyCODONE    IR 5 milliGRAM(s) Oral every 3 hours PRN Moderate Pain (4 - 6)  oxyCODONE    IR 10 milliGRAM(s) Oral every 3 hours PRN Severe Pain (7 - 10)  simethicone 80 milliGRAM(s) Chew every 6 hours PRN Gas  sodium chloride 0.65% Nasal 1 Spray(s) Both Nostrils every 1 hour PRN Nasal Congestion

## 2022-12-02 NOTE — PROGRESS NOTE ADULT - ASSESSMENT
51yo G0  LMP 11/15/22 with PMH of uterine fibroids and AUB admitted for sepsis due to fever, increased RR, and increased HR now with worsening leukocytosis.   Now, POD#2 s/p ex-lap JOANNE, BSO, abdominal wash-out for pelvic mass and removal of 2 L ascites fluid.       Cardiac: Hx of HTN. BP medications ordered with strict hold parameters. BP wnl.  Pulmonary: Hx of asthma. Home medications ordered. Incentive spirometer at bedside.   Neuro: Pain well controlled with current regimen.   Endo: Hx of DM. ISS ordered. Medicine consulted. Recommendations appreciated.   GI: Tolerating regular diet.   : Voiding spontaneously. Adequate UOP. 95 cc/hour overnight, Preop creatinine 1.95 -> 1.23 -> f/u AM.    ID: Patient on Zosyn IV due to Sepsis 2/2 infected Pelvic mass. Preop WBC 18 -> postop 16 -> 18.78. As per ID, Vancomycin added in addition to Zosyn. Urine culture negative. Blood culture NGTD. Will continue broad spectrum abx until Blood cultures result and 48 hours afberile.   Heme: Preop Hgb 9.4-> 8.0 -> 8.1. SCDs when not ambulating, Heparin ordered and will transition to Lovenox for VTE prophylaxis.   Skin: No active disease. Dressing/wound with no signs of infection.   Psych: no active problems   FEN: Tolerating PO, no IVF. f/u BMP this AM.   MSK: Encouraged ambulation. Will consult PT as patient states uses walker at home for assistance.   Dispo: Continue inpatient management  Code Status: FULL     51yo G0  LMP 11/15/22 with PMH of uterine fibroids and AUB admitted for sepsis due to fever, increased RR, and increased HR now with worsening leukocytosis.   Now, POD#2 s/p ex-lap JOANNE, BSO, abdominal wash-out for pelvic mass and removal of 2 L ascites fluid.       Cardiac: Hx of HTN. BP medications ordered with strict hold parameters. BP wnl.  Pulmonary: Hx of asthma. Home medications ordered. Incentive spirometer at bedside.   Neuro: Pain well controlled with current regimen.   Endo: Hx of DM. ISS ordered. Medicine consulted. Recommendations appreciated.   GI: Tolerating regular diet. Continue protonix.   : Voiding spontaneously. Adequate UOP. 95 cc/hour overnight, Preop creatinine 1.95 -> 1.23 -> f/u AM.    ID: Patient on Zosyn IV due to Sepsis 2/2 infected Pelvic mass. Preop WBC 18 -> postop 16 -> 18.78. As per ID, Vancomycin added in addition to Zosyn. Urine culture negative. Blood culture NGTD. Will continue broad spectrum abx until Blood cultures result and 48 hours afberile.   Heme: Preop Hgb 9.4-> 8.0 -> 8.1. SCDs when not ambulating, Heparin ordered and will transition to Lovenox for VTE prophylaxis.   Skin: No active disease. Dressing/wound with no signs of infection.   Psych: no active problems   FEN: Tolerating PO, no IVF. f/u BMP this AM.   MSK: Encouraged ambulation. Will consult PT as patient states uses walker at home for assistance.   Dispo: Continue inpatient management  Code Status: FULL

## 2022-12-03 ENCOUNTER — TRANSCRIPTION ENCOUNTER (OUTPATIENT)
Age: 52
End: 2022-12-03

## 2022-12-03 LAB
ANION GAP SERPL CALC-SCNC: 9 MMOL/L — SIGNIFICANT CHANGE UP (ref 5–17)
BASOPHILS # BLD AUTO: 0.14 K/UL — SIGNIFICANT CHANGE UP (ref 0–0.2)
BASOPHILS NFR BLD AUTO: 0.8 % — SIGNIFICANT CHANGE UP (ref 0–2)
BUN SERPL-MCNC: 10.8 MG/DL — SIGNIFICANT CHANGE UP (ref 8–20)
CALCIUM SERPL-MCNC: 9.5 MG/DL — SIGNIFICANT CHANGE UP (ref 8.4–10.5)
CHLORIDE SERPL-SCNC: 98 MMOL/L — SIGNIFICANT CHANGE UP (ref 96–108)
CO2 SERPL-SCNC: 28 MMOL/L — SIGNIFICANT CHANGE UP (ref 22–29)
CREAT SERPL-MCNC: 0.77 MG/DL — SIGNIFICANT CHANGE UP (ref 0.5–1.3)
EGFR: 92 ML/MIN/1.73M2 — SIGNIFICANT CHANGE UP
EOSINOPHIL # BLD AUTO: 0.13 K/UL — SIGNIFICANT CHANGE UP (ref 0–0.5)
EOSINOPHIL NFR BLD AUTO: 0.8 % — SIGNIFICANT CHANGE UP (ref 0–6)
GLUCOSE BLDC GLUCOMTR-MCNC: 119 MG/DL — HIGH (ref 70–99)
GLUCOSE BLDC GLUCOMTR-MCNC: 136 MG/DL — HIGH (ref 70–99)
GLUCOSE BLDC GLUCOMTR-MCNC: 99 MG/DL — SIGNIFICANT CHANGE UP (ref 70–99)
GLUCOSE BLDC GLUCOMTR-MCNC: 99 MG/DL — SIGNIFICANT CHANGE UP (ref 70–99)
GLUCOSE SERPL-MCNC: 130 MG/DL — HIGH (ref 70–99)
HCT VFR BLD CALC: 31.1 % — LOW (ref 34.5–45)
HGB BLD-MCNC: 9.7 G/DL — LOW (ref 11.5–15.5)
IMM GRANULOCYTES NFR BLD AUTO: 7.2 % — HIGH (ref 0–0.9)
LYMPHOCYTES # BLD AUTO: 15.2 % — SIGNIFICANT CHANGE UP (ref 13–44)
LYMPHOCYTES # BLD AUTO: 2.62 K/UL — SIGNIFICANT CHANGE UP (ref 1–3.3)
MAGNESIUM SERPL-MCNC: 2 MG/DL — SIGNIFICANT CHANGE UP (ref 1.6–2.6)
MCHC RBC-ENTMCNC: 26.9 PG — LOW (ref 27–34)
MCHC RBC-ENTMCNC: 31.2 GM/DL — LOW (ref 32–36)
MCV RBC AUTO: 86.1 FL — SIGNIFICANT CHANGE UP (ref 80–100)
MONOCYTES # BLD AUTO: 0.79 K/UL — SIGNIFICANT CHANGE UP (ref 0–0.9)
MONOCYTES NFR BLD AUTO: 4.6 % — SIGNIFICANT CHANGE UP (ref 2–14)
NEUTROPHILS # BLD AUTO: 12.33 K/UL — HIGH (ref 1.8–7.4)
NEUTROPHILS NFR BLD AUTO: 71.4 % — SIGNIFICANT CHANGE UP (ref 43–77)
PHOSPHATE SERPL-MCNC: 3.5 MG/DL — SIGNIFICANT CHANGE UP (ref 2.4–4.7)
PLATELET # BLD AUTO: 562 K/UL — HIGH (ref 150–400)
POTASSIUM SERPL-MCNC: 4.3 MMOL/L — SIGNIFICANT CHANGE UP (ref 3.5–5.3)
POTASSIUM SERPL-SCNC: 4.3 MMOL/L — SIGNIFICANT CHANGE UP (ref 3.5–5.3)
RBC # BLD: 3.61 M/UL — LOW (ref 3.8–5.2)
RBC # FLD: 16.4 % — HIGH (ref 10.3–14.5)
SODIUM SERPL-SCNC: 135 MMOL/L — SIGNIFICANT CHANGE UP (ref 135–145)
WBC # BLD: 17.26 K/UL — HIGH (ref 3.8–10.5)
WBC # FLD AUTO: 17.26 K/UL — HIGH (ref 3.8–10.5)

## 2022-12-03 PROCEDURE — 99232 SBSQ HOSP IP/OBS MODERATE 35: CPT

## 2022-12-03 RX ORDER — ACETAMINOPHEN 500 MG
3 TABLET ORAL
Qty: 168 | Refills: 0
Start: 2022-12-03 | End: 2022-12-16

## 2022-12-03 RX ORDER — FLUTICASONE FUROATE, UMECLIDINIUM BROMIDE AND VILANTEROL TRIFENATATE 200; 62.5; 25 UG/1; UG/1; UG/1
1 POWDER RESPIRATORY (INHALATION) DAILY
Refills: 0 | Status: DISCONTINUED | OUTPATIENT
Start: 2022-12-03 | End: 2022-12-07

## 2022-12-03 RX ORDER — BUDESONIDE AND FORMOTEROL FUMARATE DIHYDRATE 160; 4.5 UG/1; UG/1
2 AEROSOL RESPIRATORY (INHALATION)
Refills: 0 | Status: DISCONTINUED | OUTPATIENT
Start: 2022-12-03 | End: 2022-12-03

## 2022-12-03 RX ORDER — OXYCODONE HYDROCHLORIDE 5 MG/1
1 TABLET ORAL
Qty: 6 | Refills: 0
Start: 2022-12-03

## 2022-12-03 RX ORDER — POLYETHYLENE GLYCOL 3350 17 G/17G
17 POWDER, FOR SOLUTION ORAL DAILY
Refills: 0 | Status: DISCONTINUED | OUTPATIENT
Start: 2022-12-03 | End: 2022-12-07

## 2022-12-03 RX ORDER — TIOTROPIUM BROMIDE 18 UG/1
2 CAPSULE ORAL; RESPIRATORY (INHALATION) DAILY
Refills: 0 | Status: DISCONTINUED | OUTPATIENT
Start: 2022-12-03 | End: 2022-12-03

## 2022-12-03 RX ORDER — ENOXAPARIN SODIUM 100 MG/ML
40 INJECTION SUBCUTANEOUS
Qty: 1 | Refills: 0
Start: 2022-12-03 | End: 2022-12-30

## 2022-12-03 RX ADMIN — Medication 1 SPRAY(S): at 17:17

## 2022-12-03 RX ADMIN — GABAPENTIN 300 MILLIGRAM(S): 400 CAPSULE ORAL at 13:07

## 2022-12-03 RX ADMIN — OXYCODONE HYDROCHLORIDE 10 MILLIGRAM(S): 5 TABLET ORAL at 21:04

## 2022-12-03 RX ADMIN — Medication 975 MILLIGRAM(S): at 00:24

## 2022-12-03 RX ADMIN — ENOXAPARIN SODIUM 40 MILLIGRAM(S): 100 INJECTION SUBCUTANEOUS at 23:39

## 2022-12-03 RX ADMIN — PIPERACILLIN AND TAZOBACTAM 25 GRAM(S): 4; .5 INJECTION, POWDER, LYOPHILIZED, FOR SOLUTION INTRAVENOUS at 05:33

## 2022-12-03 RX ADMIN — ATORVASTATIN CALCIUM 10 MILLIGRAM(S): 80 TABLET, FILM COATED ORAL at 23:39

## 2022-12-03 RX ADMIN — GABAPENTIN 300 MILLIGRAM(S): 400 CAPSULE ORAL at 05:34

## 2022-12-03 RX ADMIN — Medication 10 MILLIEQUIVALENT(S): at 11:01

## 2022-12-03 RX ADMIN — Medication 975 MILLIGRAM(S): at 05:34

## 2022-12-03 RX ADMIN — Medication 1 SPRAY(S): at 05:34

## 2022-12-03 RX ADMIN — PIPERACILLIN AND TAZOBACTAM 25 GRAM(S): 4; .5 INJECTION, POWDER, LYOPHILIZED, FOR SOLUTION INTRAVENOUS at 13:07

## 2022-12-03 RX ADMIN — Medication 25 MILLIGRAM(S): at 05:34

## 2022-12-03 RX ADMIN — Medication 975 MILLIGRAM(S): at 23:39

## 2022-12-03 RX ADMIN — Medication 975 MILLIGRAM(S): at 17:18

## 2022-12-03 RX ADMIN — PANTOPRAZOLE SODIUM 20 MILLIGRAM(S): 20 TABLET, DELAYED RELEASE ORAL at 11:01

## 2022-12-03 RX ADMIN — Medication 975 MILLIGRAM(S): at 13:16

## 2022-12-03 RX ADMIN — POLYETHYLENE GLYCOL 3350 17 GRAM(S): 17 POWDER, FOR SOLUTION ORAL at 11:03

## 2022-12-03 RX ADMIN — OXYCODONE HYDROCHLORIDE 10 MILLIGRAM(S): 5 TABLET ORAL at 20:13

## 2022-12-03 RX ADMIN — PIPERACILLIN AND TAZOBACTAM 25 GRAM(S): 4; .5 INJECTION, POWDER, LYOPHILIZED, FOR SOLUTION INTRAVENOUS at 23:38

## 2022-12-03 RX ADMIN — Medication 975 MILLIGRAM(S): at 11:01

## 2022-12-03 RX ADMIN — AMLODIPINE BESYLATE 10 MILLIGRAM(S): 2.5 TABLET ORAL at 05:34

## 2022-12-03 RX ADMIN — LORATADINE 10 MILLIGRAM(S): 10 TABLET ORAL at 11:02

## 2022-12-03 RX ADMIN — MONTELUKAST 10 MILLIGRAM(S): 4 TABLET, CHEWABLE ORAL at 23:39

## 2022-12-03 RX ADMIN — ENOXAPARIN SODIUM 40 MILLIGRAM(S): 100 INJECTION SUBCUTANEOUS at 11:02

## 2022-12-03 RX ADMIN — Medication 975 MILLIGRAM(S): at 17:53

## 2022-12-03 NOTE — DISCHARGE NOTE PROVIDER - REASON FOR ADMISSION
Diffuse abdominal pain, Sepsis  Postop surgery  Diffuse abdominal pain, SEPSIS  s/p abdominal surgery  Diffuse abdominal pain

## 2022-12-03 NOTE — PROGRESS NOTE ADULT - ASSESSMENT
52F with PMH of HTN , DM Type 2, STEPHANIE , obesity, uterine  fibroids presents to ED with 1 week of abdominal pain accompanied by vomiting and blood in the urine. Pain started 1 week prior and was initially located in the RUQ/RLQ, but had worsened and was diffuse. Pain described as stabbing and episodic; episodes lasted approx. 1 hour and were partially relieved by Tylenol/ibuprofen. Pt also reported first-time episode of blood in the urine, did not think it is vaginal bleeding. She reported no dysuria but reported increased urge. Pt unsure of LMP, stated she had irregular vaginal bleeding since initial fibroid diagnosis in 2015, sees Dr. Tillman outpatient and was referred to [facility] for a hysterectomy.  Pt endorsed multiple episodes of nonblood, nonbilious emesis, and 1 episode on Sunday along with decreased PO intake.  Patient now s/p exp lap, JOANNE BSO, abdominal washout with 2Liters pus drained and wound vac placed POD #2.    1. Large necrotic uterine fibroid with small foci of gas, with concern of infection   Patient with severe sepsis ( tachycardic/ hypotensive / fever / renal failure ) on 11/30 -    s/p emergent ex lap with JOANNE and BSO   Abdomen filled with 2L of purulent ascites with necrotic ruptured uterine fibroid .   Sepsis resolving    Blood culture negative to date, Urine culture negative,   ID is following   being continued with Zosyn , Vanco d/tracie    f/u pathology / OR cultures - growing Group B strep  Trend WBC/Fever      2. STEPHANIE - due to sepsis - Metformin / Valsartan on hold - resolved  got ivf , avoid nephrotoxic medications , strict I and O ,       3. NIDDM - a1c- 6.6 - well controlled -   continue to hold Metformin for now , ISSC , accu check , ADA when PO      4. Hx of HTN: On amlodipine and metoprolol with parameters     5. Hx of STEPHANIE / obesity/ Asthma - noted with episodes of desaturation down to 88% -   patient is asymptomatic - likely multifactorial due to Obesity / Hx of Asthma / ? STEPHANIE ,   LIKELY POSTOP ATELECTASIS   EKG - LVH otherwise normal - no sob/ no chest pain   CTA negative for PE   IS   now on RA      6. Acute on chronic anemia - Hgb 9.7 today  consider to transfuse if Hg< 7.5 , follow H/H in am     7. Chronic post nasal drip - continue Claritin and Flonase    Will follow along with you.

## 2022-12-03 NOTE — PHYSICAL THERAPY INITIAL EVALUATION ADULT - PERTINENT HX OF CURRENT PROBLEM, REHAB EVAL
2F with PMH of uterine fibroids presents to ED with 1 week of abdominal pain accompanied by vomiting and blood in the urine. Pain started 1 week prior and was initially located in the RUQ/RLQ, but has worsened today and is now diffuse. Pain is described as stabbing and episodic; episodes last approx. 1 hour and are partially relieved by Tylenol/ibuprofen. Pt also reports first-time episode of blood in the urine today, does not think it is vaginal bleeding. She reports no dysuria but reports increased urge. Patient is now POD#2 s/p ex-lap JOANNE, BSO, abdominal wash-out for pelvic mass and removal of 2 L ascites fluid.

## 2022-12-03 NOTE — PHYSICAL THERAPY INITIAL EVALUATION ADULT - GAIT DEVIATIONS NOTED, PT EVAL
+ L trendelenberg, increased lateral sway, WBOS/decreased corey/decreased stride length/increased stride width

## 2022-12-03 NOTE — DISCHARGE NOTE PROVIDER - DETAILS OF MALNUTRITION DIAGNOSIS/DIAGNOSES
This patient has been assessed with a concern for Malnutrition and was treated during this hospitalization for the following Nutrition diagnosis/diagnoses:     -  12/06/2022: Mild protein-calorie malnutrition

## 2022-12-03 NOTE — PROGRESS NOTE ADULT - SUBJECTIVE AND OBJECTIVE BOX
LORENA DUNCAN    [id number]    52y      Female    Patient is a 52y old  Female who presents with a chief complaint of Diffuse abdominal pain, SEPSIS  s/p abdominal surgery  (03 Dec 2022 10:06)      INTERVAL HPI/OVERNIGHT EVENTS:    Patient has some lower abdominal pain, denies fever, chills, chest pain, nausea, vomiting.     REVIEW OF SYSTEMS:    CONSTITUTIONAL: No fever, fatigue  RESPIRATORY: No cough, No shortness of breath  CARDIOVASCULAR: No chest pain, palpitations  GASTROINTESTINAL: mild lower abdominal pain, No nausea, vomiting  NEUROLOGICAL: No headaches,  loss of strength.  MISCELLANEOUS: No joint swelling or pain       Vital Signs Last 24 Hrs  T(C): 36.7 (03 Dec 2022 11:03), Max: 37.4 (03 Dec 2022 00:37)  T(F): 98.1 (03 Dec 2022 11:03), Max: 99.3 (03 Dec 2022 00:37)  HR: 88 (03 Dec 2022 11:03) (82 - 94)  BP: 137/76 (03 Dec 2022 11:03) (103/65 - 137/76)  BP(mean): --  RR: 18 (03 Dec 2022 11:03) (18 - 18)  SpO2: 95% (03 Dec 2022 11:03) (93% - 96%)    Parameters below as of 03 Dec 2022 11:03  Patient On (Oxygen Delivery Method): nasal cannula        PHYSICAL EXAM:    GENERAL: Middle age female looking comfortable    HEENT: PERRL, +EOMI  NECK: soft, Supple, No JVD  CHEST/LUNG: Clear to auscultate bilaterally; No wheezing  HEART: S1S2+, Regular rate and rhythm; No murmurs  ABDOMEN: Soft, Nontender, Nondistended; Bowel sounds present  EXTREMITIES:  1+ Peripheral Pulses, No edema  SKIN: No rashes or lesions  NEURO: AAOX3  PSYCH: normal mood      LABS:                        9.7    17.26 )-----------( 562      ( 03 Dec 2022 11:48 )             31.1     12-03    135  |  98  |  10.8  ----------------------------<  130<H>  4.3   |  28.0  |  0.77    Ca    9.5      03 Dec 2022 11:48  Phos  3.5     12-03  Mg     2.0     12-03              I&O's Summary    02 Dec 2022 07:01  -  03 Dec 2022 07:00  --------------------------------------------------------  IN: 630 mL / OUT: 1700 mL / NET: -1070 mL    03 Dec 2022 07:01  -  03 Dec 2022 15:05  --------------------------------------------------------  IN: 240 mL / OUT: 1000 mL / NET: -760 mL        MEDICATIONS  (STANDING):  acetaminophen     Tablet .. 975 milliGRAM(s) Oral every 6 hours  amLODIPine   Tablet 10 milliGRAM(s) Oral daily  atorvastatin 10 milliGRAM(s) Oral at bedtime  dextrose 5%. 1000 milliLiter(s) (50 mL/Hr) IV Continuous <Continuous>  dextrose 5%. 1000 milliLiter(s) (100 mL/Hr) IV Continuous <Continuous>  dextrose 50% Injectable 25 Gram(s) IV Push once  dextrose 50% Injectable 12.5 Gram(s) IV Push once  dextrose 50% Injectable 25 Gram(s) IV Push once  enoxaparin Injectable 40 milliGRAM(s) SubCutaneous every 12 hours  fluticasone furoate/umeclidinium/vilanterol 100-62.5-25 MICROgram(s) Inhaler 1 Puff(s) Inhalation daily  fluticasone propionate 50 MICROgram(s)/spray Nasal Spray 1 Spray(s) Both Nostrils two times a day  glucagon  Injectable 1 milliGRAM(s) IntraMuscular once  insulin lispro (ADMELOG) corrective regimen sliding scale   SubCutaneous three times a day before meals  insulin lispro (ADMELOG) corrective regimen sliding scale   SubCutaneous at bedtime  lactated ringers Bolus 1000 milliLiter(s) IV Bolus once  loratadine 10 milliGRAM(s) Oral daily  metoprolol succinate ER 25 milliGRAM(s) Oral daily  montelukast 10 milliGRAM(s) Oral at bedtime  pantoprazole    Tablet 20 milliGRAM(s) Oral daily  piperacillin/tazobactam IVPB.. 3.375 Gram(s) IV Intermittent every 8 hours  polyethylene glycol 3350 17 Gram(s) Oral daily  potassium chloride    Tablet ER 10 milliEquivalent(s) Oral daily    MEDICATIONS  (PRN):  acetaminophen     Tablet .. 975 milliGRAM(s) Oral every 6 hours PRN Mild Pain (1 - 3)  albuterol    90 MICROgram(s) HFA Inhaler 2 Puff(s) Inhalation every 6 hours PRN Shortness of Breath and/or Wheezing  dextrose Oral Gel 15 Gram(s) Oral once PRN Blood Glucose LESS THAN 70 milliGRAM(s)/deciliter  mesalamine Suppository 1000 milliGRAM(s) Rectal at bedtime PRN ulcerative colitis  naloxone Injectable 0.1 milliGRAM(s) IV Push every 3 minutes PRN For ANY of the following changes in patient status:  A. RR LESS THAN 10 breaths per minute, B. Oxygen saturation LESS THAN 90%, C. Sedation score of 6  ondansetron    Tablet 8 milliGRAM(s) Oral every 8 hours PRN Nausea and/or Vomiting  ondansetron Injectable 4 milliGRAM(s) IV Push every 6 hours PRN Nausea  oxyCODONE    IR 10 milliGRAM(s) Oral every 3 hours PRN Severe Pain (7 - 10)  oxyCODONE    IR 5 milliGRAM(s) Oral every 3 hours PRN Moderate Pain (4 - 6)  simethicone 80 milliGRAM(s) Chew every 6 hours PRN Gas  sodium chloride 0.65% Nasal 1 Spray(s) Both Nostrils every 1 hour PRN Nasal Congestion

## 2022-12-03 NOTE — DISCHARGE NOTE PROVIDER - NSDCMRMEDTOKEN_GEN_ALL_CORE_FT
ibuprofen 600 mg oral tablet: 1 tab(s) orally every 6 hours, As Needed -for mild pain   metFORMIN 500 mg oral tablet: 1 tab(s) orally 2 times a day   enoxaparin 40 mg/0.4 mL injectable solution: 40 milligram(s) subcutaneously once a day MDD:1 unit per day  metFORMIN 500 mg oral tablet: 1 tab(s) orally 2 times a day  naproxen 500 mg oral tablet: 1 tab(s) orally 2 times a day MDD:2 tabs  oxyCODONE 5 mg oral capsule: 1 cap(s) orally every 8 hours MDD:3 tabs  Tylenol 325 mg oral capsule: 3 cap(s) orally every 6 hours

## 2022-12-03 NOTE — PATIENT PROFILE ADULT - FALL HARM RISK - UNIVERSAL INTERVENTIONS
Bed in lowest position, wheels locked, appropriate side rails in place/Call bell, personal items and telephone in reach/Instruct patient to call for assistance before getting out of bed or chair/Non-slip footwear when patient is out of bed/[place] to call system/Physically safe environment - no spills, clutter or unnecessary equipment/Purposeful Proactive Rounding/Room/bathroom lighting operational, light cord in reach

## 2022-12-03 NOTE — PROGRESS NOTE ADULT - SUBJECTIVE AND OBJECTIVE BOX
[facility]  INFECTIOUS DISEASES at [place] and [place]  =======================================================                               Tien Moore MD#   Camille Bateman MD*                             Antonia Mixon MD*   Yola Redmond MD*            Diplomates American Board of Internal Medicine & Infectious Diseases                # [place] Office - Appt - Tel  [phone] Fax [phone]                * [place] Office - Appt - Tel [phone] Fax [phone]                                  Hospital Consult line:  [phone]  =======================================================    LORENA DUNCAN [id number]    Follow up: Pelvic infection    s/p exp lap, JOANNE BSO, abdominal washout with 2Liters pus drained and wound vac placed 11/30      Allergies:  codeine (Nausea)  IV Contrast (Other; Pruritus; Hives)       REVIEW OF SYSTEMS:  CONSTITUTIONAL:  No Fever or chills  HEENT:  No diplopia or blurred vision.  No earache, sore throat or runny nose.  CARDIOVASCULAR:  No chest pain  RESPIRATORY:  No cough, shortness of breath  GASTROINTESTINAL:  No nausea, vomiting or diarrhea. + Abd pain   GENITOURINARY:  No dysuria, frequency or urgency. + Blood in urine  MUSCULOSKELETAL:  no joint aches, no muscle pain  SKIN:  No change in skin, hair or nails.  NEUROLOGIC:  No Headaches, seizures  PSYCHIATRIC:  No disorder of thought or mood.  ENDOCRINE:  No heat or cold intolerance  HEMATOLOGICAL:  No easy bruising or bleeding.       Physical Exam:  GEN: NAD  HEENT: normocephalic and atraumatic. EOMI. PERRL.    NECK: Supple.   LUNGS: CTA B/L.  HEART: RRR  ABDOMEN: Soft, Obese.  +BS.  Diffuse tenderness, No rebound.   : No CVA tenderness  EXTREMITIES: Without  edema.  MSK: No joint swelling  NEUROLOGIC: No Focal Deficits   PSYCHIATRIC: Appropriate affect .  SKIN: No rash      Vitals:  T(F): 98.2 (03 Dec 2022 07:48), Max: 99.3 (03 Dec 2022 00:37)  HR: 82 (03 Dec 2022 07:48)  BP: 121/73 (03 Dec 2022 07:48)  RR: 18 (03 Dec 2022 07:48)  SpO2: 93% (03 Dec 2022 07:48) (93% - 96%)  temp max in last 48H T(F): , Max: 99.3 (12-02-22 @ 04:11)      Current Antibiotics:  piperacillin/tazobactam IVPB.. 3.375 Gram(s) IV Intermittent every 8 hours    Other medications:  acetaminophen     Tablet .. 975 milliGRAM(s) Oral every 6 hours  amLODIPine   Tablet 10 milliGRAM(s) Oral daily  atorvastatin 10 milliGRAM(s) Oral at bedtime  budesonide  80 MICROgram(s)/formoterol 4.5 MICROgram(s) Inhaler 2 Puff(s) Inhalation two times a day  dextrose 5%. 1000 milliLiter(s) IV Continuous <Continuous>  dextrose 5%. 1000 milliLiter(s) IV Continuous <Continuous>  dextrose 50% Injectable 25 Gram(s) IV Push once  dextrose 50% Injectable 12.5 Gram(s) IV Push once  dextrose 50% Injectable 25 Gram(s) IV Push once  enoxaparin Injectable 40 milliGRAM(s) SubCutaneous every 12 hours  fluticasone propionate 50 MICROgram(s)/spray Nasal Spray 1 Spray(s) Both Nostrils two times a day  gabapentin 300 milliGRAM(s) Oral every 8 hours  glucagon  Injectable 1 milliGRAM(s) IntraMuscular once  insulin lispro (ADMELOG) corrective regimen sliding scale   SubCutaneous three times a day before meals  insulin lispro (ADMELOG) corrective regimen sliding scale   SubCutaneous at bedtime  lactated ringers Bolus 1000 milliLiter(s) IV Bolus once  loratadine 10 milliGRAM(s) Oral daily  methylPREDNISolone sodium succinate Injectable 40 milliGRAM(s) IV Push every 4 hours  metoprolol succinate ER 25 milliGRAM(s) Oral daily  montelukast 10 milliGRAM(s) Oral at bedtime  pantoprazole    Tablet 20 milliGRAM(s) Oral daily  polyethylene glycol 3350 17 Gram(s) Oral daily  potassium chloride    Tablet ER 10 milliEquivalent(s) Oral daily  tiotropium 2.5 MICROgram(s) Inhaler 2 Puff(s) Inhalation daily                            8.1    18.78 )-----------( 542      ( 02 Dec 2022 06:09 )             25.5     12-02    137  |  102  |  13.7  ----------------------------<  108<H>  4.5   |  26.0  |  0.83    Ca    8.5      02 Dec 2022 06:09  Phos  3.3     12-02  Mg     2.2     12-02      RECENT CULTURES:  11-30 @ 19:00 .Surgical Swab Uterine Culture     Moderate Streptococcus agalactiae (Group B)  Streptococcus agalactiae (Group B) isolated  Group B streptococci are susceptible to ampicillin,  penicillin and cefazolin, but may be resistant to  erythromycin and clindamycin.  Recommendations for intrapartum prophylaxis for Group B  streptococci are penicillin or ampicillin.    11-30 @ 02:59    RVP  NotDete    11-29 @ 19:02 Clean Catch Clean Catch (Midstream)     <10,000 CFU/mL Normal Urogenital Vivian    11-29 @ 12:45 .Blood Blood     No growth to date.    11-29 @ 12:35 .Blood Blood     No growth to date.      WBC Count: 18.78 K/uL (12-02-22 @ 06:09)  WBC Count: 16.00 K/uL (12-01-22 @ 05:45)  WBC Count: 18.12 K/uL (11-30-22 @ 16:35)  WBC Count: 16.86 K/uL (11-30-22 @ 07:50)  WBC Count: 7.06 K/uL (11-29-22 @ 10:25)    Creatinine, Serum: 0.83 mg/dL (12-02-22 @ 06:09)  Creatinine, Serum: 1.23 mg/dL (12-01-22 @ 05:45)  Creatinine, Serum: 1.95 mg/dL (11-30-22 @ 13:15)  Creatinine, Serum: 1.88 mg/dL (11-30-22 @ 07:50)  Creatinine, Serum: 0.92 mg/dL (11-29-22 @ 10:25)     SARS-CoV-2: NotDetec (11-30-22 @ 02:59)

## 2022-12-03 NOTE — PROGRESS NOTE ADULT - ASSESSMENT
52y  Female with h/o uterine fibroids presented to ED 11/29 with 1 week of diffuse abdominal pain. Patient reports pain was intermittent for 1 week prior to presentation with flare ups on 11/17, 11/20 and then 11/29. Pain initially located in the RUQ/RLQ, and worsened and is now diffuse. Pain is described as stabbing and episodic; episodes last approx. 1 hour and are partially relieved by Tylenol/ibuprofen. Associated with episode of blood in the urine on 11/30. She reports no dysuria but reports increased urge. Patient with hx of irregular vaginal bleeding since 2015. States menstrual cycle may last 7-10 day and not consistently every month. This has been since initial fibroid diagnosis in 2015. Pt sees Dr. Tillman outpatient and was referred to GYN [facility] for a hysterectomy in May 2022 for finding of 18 wk size large fibroid uterus, but was unable to followup due to insurance issues.  In the ED, patient was found to be febrile 100.6 and worked up for Sepsis due to tachycardia and hypotension. She received 1L fluid bolus x2. S/p Zosyn q8, Vancomycin x1, Zofran; Urine, blood cx collected. CT abd/pelvis obtained showing: Markedly abnormal and enlarged uterus with suspected necrosis of a 16.3 cm right fundal uterine mass, uterus 24 x18 x18 on imaging. The mass contains small foci of gas, raising concern for superimposed infection. Abnormal small to moderate amount of abdominal and pelvic ascites with anterior peritoneal soft tissue stranding. Continued on Zosyn.       Infection of Necrotic uterine mass with gas  s/p exp lap, JOANNE BSO, abdominal washout with 2Liters pus drained and wound vac placed 11/30  Fever  Leukocytosis   STEPHANIE      - Blood cultures 11/29 no growth  - RVP/COVID 19 PCR 11/30 negative   - Surgical culture reporting  Streptococcus agalactiae  - CT abd/pelvis obtained showing: Markedly abnormal and enlarged uterus with suspected necrosis of a 16.3 cm right fundal uterine mass, uterus 24 x18 x18 on imaging. The mass contains small foci of gas, raising concern for superimposed infection. Abnormal small to moderate amount of abdominal and pelvic ascites with anterior peritoneal soft tissue stranding.  - UA negative for UTI   - Continue Zosyn  - s/p exp lap, JOANNE BSO, abdominal washout with 2Liters pus drained and wound vac placed 11/30  - GYN following   - Follow up cultures  - Trend Fever  - Trend WBC      Will Follow    d/w Clinical pharmacy and RN

## 2022-12-03 NOTE — DISCHARGE NOTE PROVIDER - NSDCCPCAREPLAN_GEN_ALL_CORE_FT
PRINCIPAL DISCHARGE DIAGNOSIS  Diagnosis: Uterine mass  Assessment and Plan of Treatment:       SECONDARY DISCHARGE DIAGNOSES  Diagnosis: UTI (urinary tract infection)  Assessment and Plan of Treatment:

## 2022-12-03 NOTE — DISCHARGE NOTE PROVIDER - NSDCFUADDAPPT_GEN_ALL_CORE_FT
Please followup in the office in 7 days for removal of staples incision. A PA will call you in 1 weeks to check in on your recovery. Follow up with Dr. Navarro in 2 weeks to review pathology report.   Please followup in the office in 7 days for removal of staples incision. You will also follow up with Dr. Navarro in 1-2 weeks to review pathology report.

## 2022-12-03 NOTE — DISCHARGE NOTE PROVIDER - NSDCCPTREATMENT_GEN_ALL_CORE_FT
PRINCIPAL PROCEDURE  Procedure: JOANNE and BSO (total abdominal hysterectomy and bilateral salpingo-oophorectomy)  Findings and Treatment:

## 2022-12-03 NOTE — DISCHARGE NOTE PROVIDER - CARE PROVIDER_API CALL
Bharti Navarro)  [facility]  [address] [place] [address]  Phone: [phone]  Fax: [phone]  Follow Up Time:

## 2022-12-03 NOTE — DISCHARGE NOTE PROVIDER - HOSPITAL COURSE
Patient admitted for diffuse abodminal pain and found to be septic. Patient underwent s/p ex-lap JOANNE, BSO, abdominal wash-out with removal of 2 L ascites fluid. She was transferred to the floor in stable condition after uneventful PACU recovery. Her hospital stay was uncomplicated. Upon discharge she was ambulating, voiding, tolerating PO. Pain well controlled with prn pain meds.   Patient admitted for diffuse abodminal pain and found to be septic. Patient underwent s/p ex-lap JOANNE, BSO, abdominal wash-out with removal of 2 L ascites fluid. She was transferred to the floor in stable condition after uneventful PACU recovery. Her hospital stay was uncomplicated. Upon discharge she was ambulating, voiding, tolerating PO. Pain well controlled with prn pain meds. Labs noted to be clinically improving.

## 2022-12-03 NOTE — PHYSICAL THERAPY INITIAL EVALUATION ADULT - GAIT DISTANCE, PT EVAL
+ seated rest break due to c/o lightheadedness in hallway. Patient's BP taken in sitting, readin/83, SPo2 97% on 1L. Patient waited to lightheadedness subsided, and ambulated back to room another 50 feet/150 feet

## 2022-12-03 NOTE — PROGRESS NOTE ADULT - SUBJECTIVE AND OBJECTIVE BOX
GYNECOLOGIC ONCOLOGY PROGRESS NOTE    POD#3  HD#5    PROBLEMS:  Sepsis  Pelvic mass    Pt seen and examined at bedside.     SUBJECTIVE:    Patient reports feeling better than yesterday  Pain well-controlled.  Flatus: yes  She reports urge to have a BM  Denies Nausea, Vomiting or Diarrhea.  Denies shortness of breath, chest pain or dyspnea on exertion.  She reports improvement in her breathing with the inhalers  Tolerating diet.  She is ambulating to the bathroom    OBJECTIVE:     VITALS:  T(F): 99.3 (12-03-22 @ 0503)  HR: 94 (12-03-22 @ 0503)  BP: 103/65 (12-03-22 @ 0503)  RR: 18 (12-03-22 @ 0503)  SpO2: 93% on 1L NC (12-03-22 @ 0503)  Wt(kg): --          MEDICATIONS  (STANDING):  acetaminophen     Tablet .. 975 milliGRAM(s) Oral every 6 hours  amLODIPine   Tablet 10 milliGRAM(s) Oral daily  atorvastatin 10 milliGRAM(s) Oral at bedtime  dextrose 5%. 1000 milliLiter(s) (50 mL/Hr) IV Continuous <Continuous>  dextrose 5%. 1000 milliLiter(s) (100 mL/Hr) IV Continuous <Continuous>  dextrose 50% Injectable 25 Gram(s) IV Push once  dextrose 50% Injectable 12.5 Gram(s) IV Push once  dextrose 50% Injectable 25 Gram(s) IV Push once  enoxaparin Injectable 40 milliGRAM(s) SubCutaneous every 12 hours  fluticasone furoate/umeclidinium/vilanterol 100-62.5-25 MICROgram(s) Inhaler 1 Puff(s) Inhalation daily  fluticasone propionate 50 MICROgram(s)/spray Nasal Spray 1 Spray(s) Both Nostrils two times a day  glucagon  Injectable 1 milliGRAM(s) IntraMuscular once  insulin lispro (ADMELOG) corrective regimen sliding scale   SubCutaneous three times a day before meals  insulin lispro (ADMELOG) corrective regimen sliding scale   SubCutaneous at bedtime  lactated ringers Bolus 1000 milliLiter(s) IV Bolus once  loratadine 10 milliGRAM(s) Oral daily  metoprolol succinate ER 25 milliGRAM(s) Oral daily  montelukast 10 milliGRAM(s) Oral at bedtime  pantoprazole    Tablet 20 milliGRAM(s) Oral daily  piperacillin/tazobactam IVPB.. 3.375 Gram(s) IV Intermittent every 8 hours  polyethylene glycol 3350 17 Gram(s) Oral daily  potassium chloride    Tablet ER 10 milliEquivalent(s) Oral daily    MEDICATIONS  (PRN):  acetaminophen     Tablet .. 975 milliGRAM(s) Oral every 6 hours PRN Mild Pain (1 - 3)  albuterol    90 MICROgram(s) HFA Inhaler 2 Puff(s) Inhalation every 6 hours PRN Shortness of Breath and/or Wheezing  dextrose Oral Gel 15 Gram(s) Oral once PRN Blood Glucose LESS THAN 70 milliGRAM(s)/deciliter  mesalamine Suppository 1000 milliGRAM(s) Rectal at bedtime PRN ulcerative colitis  naloxone Injectable 0.1 milliGRAM(s) IV Push every 3 minutes PRN For ANY of the following changes in patient status:  A. RR LESS THAN 10 breaths per minute, B. Oxygen saturation LESS THAN 90%, C. Sedation score of 6  ondansetron    Tablet 8 milliGRAM(s) Oral every 8 hours PRN Nausea and/or Vomiting  ondansetron Injectable 4 milliGRAM(s) IV Push every 6 hours PRN Nausea  oxyCODONE    IR 5 milliGRAM(s) Oral every 3 hours PRN Moderate Pain (4 - 6)  oxyCODONE    IR 10 milliGRAM(s) Oral every 3 hours PRN Severe Pain (7 - 10)  simethicone 80 milliGRAM(s) Chew every 6 hours PRN Gas  sodium chloride 0.65% Nasal 1 Spray(s) Both Nostrils every 1 hour PRN Nasal Congestion      Physical Exam:  Constitutional: NAD  Pulmonary: clear to auscultation bilaterally, IS to 1500  Cardiovascular: Regular rate and rhythm   Abdomen: soft, appropriately tender, non-distended, normal bowel sounds  Incision: Provena clean, dry, intact with good vacuum.  Without signs of infection or hernia.  Extremities: no lower extremity edema or calve tenderness, Guillermo's sign negative.        LABS:                        8.1    18.78 )-----------( 542      ( 02 Dec 2022 06:09 )             25.5     12-02    137  |  102  |  13.7  ----------------------------<  108  4.5   |  26.0  |  0.83    Ca    8.5      02 Dec 2022 06:09  Phos  3.3     12-02  Mg     2.2     12-02                RADIOLOGY & ADDITIONAL TESTS:  < from: CT Angio Chest PE Protocol w/ IV Cont (12.02.22 @ 22:08) >  FINDINGS:    LUNGS AND AIRWAYS: Patent central airways.  Mild bibasilar degenerative   changes.  PLEURA: No pleural effusion.  MEDIASTINUM AND MATT: No lymphadenopathy.  VESSELS: No pulmonary embolism. Limited evaluation of the distal   segmental and subsegmental areas due to poor opacification.  HEART: Heart size is normal. No pericardial effusion.  CHEST WALL AND LOWER NECK: Mild upper abdominal wall subcutaneous edema.  VISUALIZED UPPER ABDOMEN: Trace perihepatic fluid  BONES: Degenerative changes.      IMPRESSION:  No pulmonary embolism. Limited evaluation of the distal segmental and   subsegmental areas due to poor opacification.    VERTEBRAL BODY ANALYSIS: No Vertebral fracture or low bone density   identified.    < end of copied text >

## 2022-12-03 NOTE — PHYSICAL THERAPY INITIAL EVALUATION ADULT - DID THE PATIENT HAVE SURGERY?
POD#2 s/p ex-lap JOANNE, BSO, abdominal wash-out for pelvic mass and removal of 2 L ascites fluid./yes

## 2022-12-03 NOTE — PROGRESS NOTE ADULT - ASSESSMENT
A/P  51yo POD#3 s/p ex-lap, TAB, BSO, abdominal wash-out and removal of 2L purulent ascites for worsening sepsis, pelvic mass, and ascites      Gen: VSS  Neuro: Pain well controlled on current regimen  CV: H/o HTN. Currently normotensive. Some home antihypertensives held 2/2 nephrotoxicity per medicine. Continue current antihypertensives with strict hold parameters. Medicine following, appreciate continued recommendations  Pulm: H/o asthma. CTA showing atelectasis without evidence of PE. Patient reports moderate relief of symptoms with hospital inhalers, but states her home inhalers work better. Will call pharmacy to allow patient to use her own meds, incentive spirometer use encouraged  GI: Bowel sounds/function normal, tolerating PO diet. Continue miralax for bowel reigmen and protonix  : Miguel removed, voiding spontaneously. Creatinine improving, 0.83 yesterday form peak of 1.95. Labs pending this AM  Heme: AM labs pending  Endocrine: H/o DM2, consistent carbohydrate diet and ISS. Continue to hold home metformin. Appreciate continued medicine recs  ID: Continued leukocytosis noted. 18.78 yesterday, CBC pending this AM. Patient remains afebrile. Surgical swab growing GBS, blood and urine cultures NGTD. ID following. Continue Zosyn, appreciate continued ID recs  MSK: PT consult pending  DVT ppx: ambulation encouraged, SCDs when in bed, lovenox  Dispo: Continue inpatient care

## 2022-12-03 NOTE — PHYSICAL THERAPY INITIAL EVALUATION ADULT - ADDITIONAL COMMENTS
Patient lives in a private 1 bedroom home with laundry in basement with 3 LIZ + HRs. Patient has laundry in basement. Patient has RW and commode at home, but denies using them. Patient's cousin will be moving into home with patient this weekend

## 2022-12-03 NOTE — DISCHARGE NOTE PROVIDER - NSDCFUADDINST_GEN_ALL_CORE_FT
Please contact your provider for any pain uncontrolled by medication, excessive bleeding or Fever > 100.4.   May walk and climb stairs as often as you'd like, no vigorous activity, do not lift anything greater than 10lbs, nothing per vagina x6 weeks, do not drive while on pain medication.    Please contact your provider for any pain uncontrolled by medication, excessive bleeding or Fever>100.4  Please take Naproxen - 1 tablet every 12 hours x 3 days, and tylenol- 2 tabs every 6 hours x 3 days, may take oxycodone as prescribed for breakthrough pain. Please use Lovenox daily to prevent blood clots.

## 2022-12-04 LAB
ANION GAP SERPL CALC-SCNC: 12 MMOL/L — SIGNIFICANT CHANGE UP (ref 5–17)
BUN SERPL-MCNC: 12.1 MG/DL — SIGNIFICANT CHANGE UP (ref 8–20)
CALCIUM SERPL-MCNC: 8.8 MG/DL — SIGNIFICANT CHANGE UP (ref 8.4–10.5)
CHLORIDE SERPL-SCNC: 98 MMOL/L — SIGNIFICANT CHANGE UP (ref 96–108)
CO2 SERPL-SCNC: 28 MMOL/L — SIGNIFICANT CHANGE UP (ref 22–29)
CREAT SERPL-MCNC: 0.71 MG/DL — SIGNIFICANT CHANGE UP (ref 0.5–1.3)
CULTURE RESULTS: SIGNIFICANT CHANGE UP
CULTURE RESULTS: SIGNIFICANT CHANGE UP
EGFR: 102 ML/MIN/1.73M2 — SIGNIFICANT CHANGE UP
GLUCOSE BLDC GLUCOMTR-MCNC: 106 MG/DL — HIGH (ref 70–99)
GLUCOSE BLDC GLUCOMTR-MCNC: 107 MG/DL — HIGH (ref 70–99)
GLUCOSE BLDC GLUCOMTR-MCNC: 110 MG/DL — HIGH (ref 70–99)
GLUCOSE BLDC GLUCOMTR-MCNC: 191 MG/DL — HIGH (ref 70–99)
GLUCOSE SERPL-MCNC: 90 MG/DL — SIGNIFICANT CHANGE UP (ref 70–99)
HCT VFR BLD CALC: 29.9 % — LOW (ref 34.5–45)
HGB BLD-MCNC: 9.1 G/DL — LOW (ref 11.5–15.5)
MAGNESIUM SERPL-MCNC: 1.7 MG/DL — SIGNIFICANT CHANGE UP (ref 1.6–2.6)
MCHC RBC-ENTMCNC: 26.2 PG — LOW (ref 27–34)
MCHC RBC-ENTMCNC: 30.4 GM/DL — LOW (ref 32–36)
MCV RBC AUTO: 86.2 FL — SIGNIFICANT CHANGE UP (ref 80–100)
NRBC # BLD: 1 /100 WBCS — HIGH (ref 0–0)
PHOSPHATE SERPL-MCNC: 4.2 MG/DL — SIGNIFICANT CHANGE UP (ref 2.4–4.7)
PLATELET # BLD AUTO: 611 K/UL — HIGH (ref 150–400)
POTASSIUM SERPL-MCNC: 4.4 MMOL/L — SIGNIFICANT CHANGE UP (ref 3.5–5.3)
POTASSIUM SERPL-SCNC: 4.4 MMOL/L — SIGNIFICANT CHANGE UP (ref 3.5–5.3)
RBC # BLD: 3.47 M/UL — LOW (ref 3.8–5.2)
RBC # FLD: 16.5 % — HIGH (ref 10.3–14.5)
SODIUM SERPL-SCNC: 138 MMOL/L — SIGNIFICANT CHANGE UP (ref 135–145)
SPECIMEN SOURCE: SIGNIFICANT CHANGE UP
SPECIMEN SOURCE: SIGNIFICANT CHANGE UP
WBC # BLD: 16.43 K/UL — HIGH (ref 3.8–10.5)
WBC # FLD AUTO: 16.43 K/UL — HIGH (ref 3.8–10.5)

## 2022-12-04 PROCEDURE — 99232 SBSQ HOSP IP/OBS MODERATE 35: CPT

## 2022-12-04 RX ADMIN — Medication 975 MILLIGRAM(S): at 14:04

## 2022-12-04 RX ADMIN — Medication 975 MILLIGRAM(S): at 18:05

## 2022-12-04 RX ADMIN — POLYETHYLENE GLYCOL 3350 17 GRAM(S): 17 POWDER, FOR SOLUTION ORAL at 14:04

## 2022-12-04 RX ADMIN — Medication 975 MILLIGRAM(S): at 14:42

## 2022-12-04 RX ADMIN — Medication 1 SPRAY(S): at 17:21

## 2022-12-04 RX ADMIN — Medication 10 MILLIEQUIVALENT(S): at 14:03

## 2022-12-04 RX ADMIN — PANTOPRAZOLE SODIUM 20 MILLIGRAM(S): 20 TABLET, DELAYED RELEASE ORAL at 14:03

## 2022-12-04 RX ADMIN — Medication 975 MILLIGRAM(S): at 05:09

## 2022-12-04 RX ADMIN — Medication 2: at 17:18

## 2022-12-04 RX ADMIN — ALBUTEROL 2 PUFF(S): 90 AEROSOL, METERED ORAL at 00:56

## 2022-12-04 RX ADMIN — ENOXAPARIN SODIUM 40 MILLIGRAM(S): 100 INJECTION SUBCUTANEOUS at 21:42

## 2022-12-04 RX ADMIN — ENOXAPARIN SODIUM 40 MILLIGRAM(S): 100 INJECTION SUBCUTANEOUS at 10:14

## 2022-12-04 RX ADMIN — AMLODIPINE BESYLATE 10 MILLIGRAM(S): 2.5 TABLET ORAL at 05:09

## 2022-12-04 RX ADMIN — PIPERACILLIN AND TAZOBACTAM 25 GRAM(S): 4; .5 INJECTION, POWDER, LYOPHILIZED, FOR SOLUTION INTRAVENOUS at 14:02

## 2022-12-04 RX ADMIN — PIPERACILLIN AND TAZOBACTAM 25 GRAM(S): 4; .5 INJECTION, POWDER, LYOPHILIZED, FOR SOLUTION INTRAVENOUS at 05:09

## 2022-12-04 RX ADMIN — PIPERACILLIN AND TAZOBACTAM 25 GRAM(S): 4; .5 INJECTION, POWDER, LYOPHILIZED, FOR SOLUTION INTRAVENOUS at 21:44

## 2022-12-04 RX ADMIN — Medication 25 MILLIGRAM(S): at 05:09

## 2022-12-04 RX ADMIN — LORATADINE 10 MILLIGRAM(S): 10 TABLET ORAL at 14:03

## 2022-12-04 RX ADMIN — ATORVASTATIN CALCIUM 10 MILLIGRAM(S): 80 TABLET, FILM COATED ORAL at 21:35

## 2022-12-04 RX ADMIN — MONTELUKAST 10 MILLIGRAM(S): 4 TABLET, CHEWABLE ORAL at 21:31

## 2022-12-04 RX ADMIN — Medication 975 MILLIGRAM(S): at 17:20

## 2022-12-04 RX ADMIN — Medication 1 SPRAY(S): at 05:09

## 2022-12-04 NOTE — PROGRESS NOTE ADULT - SUBJECTIVE AND OBJECTIVE BOX
[facility]  INFECTIOUS DISEASES at [place] [facility] [place]  =======================================================                               Tien Moore MD#   Camille Bateman MD*                             Antonia Mixon MD*   Yola Redmond MD*            Diplomates American Board of Internal Medicine & Infectious Diseases                # [place] Office - Appt - Tel  [phone] Fax [phone]                * [place] Office - Appt - Tel [phone] Fax [phone]                                  Hospital Consult line:  [phone]  =======================================================    LORENA DUNCAN [id number]    Follow up: Pelvic infection    s/p exp lap, JOANNE BSO, abdominal washout with 2Liters pus drained and wound vac placed 11/30      Allergies:  codeine (Nausea)  IV Contrast (Other; Pruritus; Hives)       REVIEW OF SYSTEMS:  CONSTITUTIONAL:  No Fever or chills  HEENT:  No diplopia or blurred vision.  No earache, sore throat or runny nose.  CARDIOVASCULAR:  No chest pain  RESPIRATORY:  No cough, shortness of breath  GASTROINTESTINAL:  No nausea, vomiting or diarrhea. + Abd pain   GENITOURINARY:  No dysuria, frequency or urgency. + Blood in urine  MUSCULOSKELETAL:  no joint aches, no muscle pain  SKIN:  No change in skin, hair or nails.  NEUROLOGIC:  No Headaches, seizures  PSYCHIATRIC:  No disorder of thought or mood.  ENDOCRINE:  No heat or cold intolerance  HEMATOLOGICAL:  No easy bruising or bleeding.       Physical Exam:  GEN: NAD  HEENT: normocephalic and atraumatic. EOMI. PERRL.    NECK: Supple.   LUNGS: CTA B/L.  HEART: RRR  ABDOMEN: Soft, Obese.  +BS.  Diffuse tenderness, No rebound.   : No CVA tenderness  EXTREMITIES: Without  edema.  MSK: No joint swelling  NEUROLOGIC: No Focal Deficits   PSYCHIATRIC: Appropriate affect .  SKIN: No rash      Vitals:  T(F): 98.7 (04 Dec 2022 07:15), Max: 99.2 (03 Dec 2022 17:05)  HR: 82 (04 Dec 2022 07:15)  BP: 118/72 (04 Dec 2022 07:15)  RR: 17 (04 Dec 2022 07:15)  SpO2: 97% (04 Dec 2022 07:15) (91% - 99%)  temp max in last 48H T(F): , Max: 99.3 (12-03-22 @ 00:37)    Current Antibiotics:  piperacillin/tazobactam IVPB.. 3.375 Gram(s) IV Intermittent every 8 hours    Other medications:  acetaminophen     Tablet .. 975 milliGRAM(s) Oral every 6 hours  amLODIPine   Tablet 10 milliGRAM(s) Oral daily  atorvastatin 10 milliGRAM(s) Oral at bedtime  dextrose 5%. 1000 milliLiter(s) IV Continuous <Continuous>  dextrose 5%. 1000 milliLiter(s) IV Continuous <Continuous>  dextrose 50% Injectable 25 Gram(s) IV Push once  dextrose 50% Injectable 12.5 Gram(s) IV Push once  dextrose 50% Injectable 25 Gram(s) IV Push once  enoxaparin Injectable 40 milliGRAM(s) SubCutaneous every 12 hours  fluticasone furoate/umeclidinium/vilanterol 100-62.5-25 MICROgram(s) Inhaler 1 Puff(s) Inhalation daily  fluticasone propionate 50 MICROgram(s)/spray Nasal Spray 1 Spray(s) Both Nostrils two times a day  glucagon  Injectable 1 milliGRAM(s) IntraMuscular once  insulin lispro (ADMELOG) corrective regimen sliding scale   SubCutaneous three times a day before meals  insulin lispro (ADMELOG) corrective regimen sliding scale   SubCutaneous at bedtime  lactated ringers Bolus 1000 milliLiter(s) IV Bolus once  loratadine 10 milliGRAM(s) Oral daily  metoprolol succinate ER 25 milliGRAM(s) Oral daily  montelukast 10 milliGRAM(s) Oral at bedtime  pantoprazole    Tablet 20 milliGRAM(s) Oral daily  polyethylene glycol 3350 17 Gram(s) Oral daily  potassium chloride    Tablet ER 10 milliEquivalent(s) Oral daily                 9.1    16.43 )-----------( 611      ( 04 Dec 2022 05:15 )             29.9     12-04    138  |  98  |  12.1  ----------------------------<  90  4.4   |  28.0  |  0.71    Ca    8.8      04 Dec 2022 05:15  Phos  4.2     12-04  Mg     1.7     12-04      RECENT CULTURES:  11-30 @ 19:00 .Surgical Swab Uterine Culture     Moderate Streptococcus agalactiae (Group B)  Streptococcus agalactiae (Group B) isolated  Group B streptococci are susceptible to ampicillin,  penicillin and cefazolin, but may be resistant to  erythromycin and clindamycin.  Recommendations for intrapartum prophylaxis for Group B  streptococci are penicillin or ampicillin.    11-30 @ 02:59    RVP  NotDetec    11-29 @ 19:02 Clean Catch Clean Catch (Midstream)     <10,000 CFU/mL Normal Urogenital Vivian    11-29 @ 12:45 .Blood Blood     No growth to date.    11-29 @ 12:35 .Blood Blood     No growth to date.      WBC Count: 16.43 K/uL (12-04-22 @ 05:15)  WBC Count: 17.26 K/uL (12-03-22 @ 11:48)  WBC Count: 18.78 K/uL (12-02-22 @ 06:09)  WBC Count: 16.00 K/uL (12-01-22 @ 05:45)  WBC Count: 18.12 K/uL (11-30-22 @ 16:35)  WBC Count: 16.86 K/uL (11-30-22 @ 07:50)  WBC Count: 7.06 K/uL (11-29-22 @ 10:25)    Creatinine, Serum: 0.71 mg/dL (12-04-22 @ 05:15)  Creatinine, Serum: 0.77 mg/dL (12-03-22 @ 11:48)  Creatinine, Serum: 0.83 mg/dL (12-02-22 @ 06:09)  Creatinine, Serum: 1.23 mg/dL (12-01-22 @ 05:45)  Creatinine, Serum: 1.95 mg/dL (11-30-22 @ 13:15)  Creatinine, Serum: 1.88 mg/dL (11-30-22 @ 07:50)  Creatinine, Serum: 0.92 mg/dL (11-29-22 @ 10:25)     SARS-CoV-2: NotDetec (11-30-22 @ 02:59)        < from: CT Angio Chest PE Protocol w/ IV Cont (12.02.22 @ 22:08) >  ACC: [id number] EXAM:  CT ANGIO CHEST PULM ART [facility]                          PROCEDURE DATE:  12/02/2022      INTERPRETATION:  CLINICAL INFORMATION: Hypoxia question pulmonary embolism    COMPARISON: CT abdomen and pelvis 11/29/2022.    CONTRAST/COMPLICATIONS:  IV Contrast: Omnipaque 350  53 cc administered   47 cc discarded  Oral Contrast: NONE  Complications: None reported at time of study completion    PROCEDURE:  CT Angiography of the Chest.  Sagittal and coronal reformats were performedas well as 3D (MIP)   reconstructions.    FINDINGS:    LUNGS AND AIRWAYS: Patent central airways.  Mild bibasilar degenerative   changes.  PLEURA: No pleural effusion.  MEDIASTINUM AND MATT: No lymphadenopathy.  VESSELS: No pulmonary embolism. Limited evaluation of the distal   segmental and subsegmental areas due to poor opacification.  HEART: Heart size is normal. No pericardial effusion.  CHEST WALL AND LOWER NECK: Mild upper abdominal wall subcutaneous edema.  VISUALIZED UPPER ABDOMEN: Trace perihepatic fluid  BONES: Degenerative changes.    IMPRESSION:  No pulmonary embolism. Limited evaluation of the distal segmental and   subsegmental areas due to poor opacification.    VERTEBRAL BODY ANALYSIS: No Vertebral fracture or low bone density   identified.    --- End of Report ---  < end of copied text >

## 2022-12-04 NOTE — PROGRESS NOTE ADULT - SUBJECTIVE AND OBJECTIVE BOX
LORENA DUNCAN    [id number]    52y      Female    Patient is a 52y old  Female who presents with a chief complaint of Diffuse abdominal pain (04 Dec 2022 07:38)      INTERVAL HPI/OVERNIGHT EVENTS:    Patient abdominal/pelvic pain is better with pain meds,  denies fever, chills, chest pain, nausea, vomiting.     REVIEW OF SYSTEMS:    CONSTITUTIONAL: No fever, fatigue  RESPIRATORY: No cough, No shortness of breath  CARDIOVASCULAR: No chest pain, palpitations  GASTROINTESTINAL: mild lower abdominal pain, No nausea, vomiting  NEUROLOGICAL: No headaches,  loss of strength.  MISCELLANEOUS: No joint swelling or pain       Vital Signs Last 24 Hrs  T(C): 37.1 (04 Dec 2022 07:15), Max: 37.3 (03 Dec 2022 17:05)  T(F): 98.7 (04 Dec 2022 07:15), Max: 99.2 (03 Dec 2022 17:05)  HR: 82 (04 Dec 2022 07:15) (79 - 88)  BP: 118/72 (04 Dec 2022 07:15) (107/68 - 137/76)  RR: 17 (04 Dec 2022 07:15) (17 - 18)  SpO2: 97% (04 Dec 2022 07:15) (91% - 99%)    Parameters below as of 04 Dec 2022 08:00  Patient On (Oxygen Delivery Method): nasal cannula        PHYSICAL EXAM:      GENERAL: Middle age female looking comfortable    HEENT: PERRL, +EOMI  NECK: soft, Supple, No JVD  CHEST/LUNG: Clear to auscultate bilaterally; No wheezing  HEART: S1S2+, Regular rate and rhythm; No murmurs  ABDOMEN: Soft, Nontender, Nondistended; Bowel sounds present  EXTREMITIES:  1+ Peripheral Pulses, No edema  SKIN: No rashes or lesions  NEURO: AAOX3  PSYCH: normal mood      LABS:                        9.1    16.43 )-----------( 611      ( 04 Dec 2022 05:15 )             29.9     12-04    138  |  98  |  12.1  ----------------------------<  90  4.4   |  28.0  |  0.71    Ca    8.8      04 Dec 2022 05:15  Phos  4.2     12-04  Mg     1.7     12-04              I&O's Summary    03 Dec 2022 07:01  -  04 Dec 2022 07:00  --------------------------------------------------------  IN: 240 mL / OUT: 1000 mL / NET: -760 mL        MEDICATIONS  (STANDING):  acetaminophen     Tablet .. 975 milliGRAM(s) Oral every 6 hours  amLODIPine   Tablet 10 milliGRAM(s) Oral daily  atorvastatin 10 milliGRAM(s) Oral at bedtime  dextrose 5%. 1000 milliLiter(s) (100 mL/Hr) IV Continuous <Continuous>  dextrose 5%. 1000 milliLiter(s) (50 mL/Hr) IV Continuous <Continuous>  dextrose 50% Injectable 25 Gram(s) IV Push once  dextrose 50% Injectable 12.5 Gram(s) IV Push once  dextrose 50% Injectable 25 Gram(s) IV Push once  enoxaparin Injectable 40 milliGRAM(s) SubCutaneous every 12 hours  fluticasone furoate/umeclidinium/vilanterol 100-62.5-25 MICROgram(s) Inhaler 1 Puff(s) Inhalation daily  fluticasone propionate 50 MICROgram(s)/spray Nasal Spray 1 Spray(s) Both Nostrils two times a day  glucagon  Injectable 1 milliGRAM(s) IntraMuscular once  insulin lispro (ADMELOG) corrective regimen sliding scale   SubCutaneous three times a day before meals  insulin lispro (ADMELOG) corrective regimen sliding scale   SubCutaneous at bedtime  lactated ringers Bolus 1000 milliLiter(s) IV Bolus once  loratadine 10 milliGRAM(s) Oral daily  metoprolol succinate ER 25 milliGRAM(s) Oral daily  montelukast 10 milliGRAM(s) Oral at bedtime  pantoprazole    Tablet 20 milliGRAM(s) Oral daily  piperacillin/tazobactam IVPB.. 3.375 Gram(s) IV Intermittent every 8 hours  polyethylene glycol 3350 17 Gram(s) Oral daily  potassium chloride    Tablet ER 10 milliEquivalent(s) Oral daily    MEDICATIONS  (PRN):  acetaminophen     Tablet .. 975 milliGRAM(s) Oral every 6 hours PRN Mild Pain (1 - 3)  albuterol    90 MICROgram(s) HFA Inhaler 2 Puff(s) Inhalation every 6 hours PRN Shortness of Breath and/or Wheezing  dextrose Oral Gel 15 Gram(s) Oral once PRN Blood Glucose LESS THAN 70 milliGRAM(s)/deciliter  mesalamine Suppository 1000 milliGRAM(s) Rectal at bedtime PRN ulcerative colitis  naloxone Injectable 0.1 milliGRAM(s) IV Push every 3 minutes PRN For ANY of the following changes in patient status:  A. RR LESS THAN 10 breaths per minute, B. Oxygen saturation LESS THAN 90%, C. Sedation score of 6  ondansetron    Tablet 8 milliGRAM(s) Oral every 8 hours PRN Nausea and/or Vomiting  ondansetron Injectable 4 milliGRAM(s) IV Push every 6 hours PRN Nausea  oxyCODONE    IR 5 milliGRAM(s) Oral every 3 hours PRN Moderate Pain (4 - 6)  oxyCODONE    IR 10 milliGRAM(s) Oral every 3 hours PRN Severe Pain (7 - 10)  simethicone 80 milliGRAM(s) Chew every 6 hours PRN Gas  sodium chloride 0.65% Nasal 1 Spray(s) Both Nostrils every 1 hour PRN Nasal Congestion

## 2022-12-04 NOTE — PROGRESS NOTE ADULT - SUBJECTIVE AND OBJECTIVE BOX
GYNECOLOGIC ONCOLOGY PROGRESS NOTE    POD#4    PROBLEMS:    Pt seen and examined at bedside.     SUBJECTIVE:  Patient is without complaints.  Pain well-controlled.  Flatus: Yes  Denies Nausea, Vomiting or Diarrhea.  Denies shortness of breath, chest pain or dyspnea on exertion.  Tolerating diet.    OBJECTIVE:     VITALS:  T(F): 98.4 (12-04-22 @ 04:00), Max: 99.2 (12-03-22 @ 17:05)  HR: 84 (12-04-22 @ 04:00) (79 - 88)  BP: 110/71 (12-04-22 @ 04:00) (107/68 - 137/76)  RR: 18 (12-04-22 @ 04:00) (18 - 18)  SpO2: 99% (12-04-22 @ 04:00) (91% - 99%)    I&O's Summary    03 Dec 2022 07:01  -  04 Dec 2022 07:00  --------------------------------------------------------  IN: 240 mL / OUT: 1000 mL / NET: -760 mL      MEDICATIONS  (STANDING):  acetaminophen     Tablet .. 975 milliGRAM(s) Oral every 6 hours  amLODIPine   Tablet 10 milliGRAM(s) Oral daily  atorvastatin 10 milliGRAM(s) Oral at bedtime  dextrose 5%. 1000 milliLiter(s) (50 mL/Hr) IV Continuous <Continuous>  dextrose 5%. 1000 milliLiter(s) (100 mL/Hr) IV Continuous <Continuous>  dextrose 50% Injectable 25 Gram(s) IV Push once  dextrose 50% Injectable 12.5 Gram(s) IV Push once  dextrose 50% Injectable 25 Gram(s) IV Push once  enoxaparin Injectable 40 milliGRAM(s) SubCutaneous every 12 hours  fluticasone furoate/umeclidinium/vilanterol 100-62.5-25 MICROgram(s) Inhaler 1 Puff(s) Inhalation daily  fluticasone propionate 50 MICROgram(s)/spray Nasal Spray 1 Spray(s) Both Nostrils two times a day  glucagon  Injectable 1 milliGRAM(s) IntraMuscular once  insulin lispro (ADMELOG) corrective regimen sliding scale   SubCutaneous three times a day before meals  insulin lispro (ADMELOG) corrective regimen sliding scale   SubCutaneous at bedtime  lactated ringers Bolus 1000 milliLiter(s) IV Bolus once  loratadine 10 milliGRAM(s) Oral daily  metoprolol succinate ER 25 milliGRAM(s) Oral daily  montelukast 10 milliGRAM(s) Oral at bedtime  pantoprazole    Tablet 20 milliGRAM(s) Oral daily  piperacillin/tazobactam IVPB.. 3.375 Gram(s) IV Intermittent every 8 hours  polyethylene glycol 3350 17 Gram(s) Oral daily  potassium chloride    Tablet ER 10 milliEquivalent(s) Oral daily    MEDICATIONS  (PRN):  acetaminophen     Tablet .. 975 milliGRAM(s) Oral every 6 hours PRN Mild Pain (1 - 3)  albuterol    90 MICROgram(s) HFA Inhaler 2 Puff(s) Inhalation every 6 hours PRN Shortness of Breath and/or Wheezing  dextrose Oral Gel 15 Gram(s) Oral once PRN Blood Glucose LESS THAN 70 milliGRAM(s)/deciliter  mesalamine Suppository 1000 milliGRAM(s) Rectal at bedtime PRN ulcerative colitis  naloxone Injectable 0.1 milliGRAM(s) IV Push every 3 minutes PRN For ANY of the following changes in patient status:  A. RR LESS THAN 10 breaths per minute, B. Oxygen saturation LESS THAN 90%, C. Sedation score of 6  ondansetron    Tablet 8 milliGRAM(s) Oral every 8 hours PRN Nausea and/or Vomiting  ondansetron Injectable 4 milliGRAM(s) IV Push every 6 hours PRN Nausea  oxyCODONE    IR 5 milliGRAM(s) Oral every 3 hours PRN Moderate Pain (4 - 6)  oxyCODONE    IR 10 milliGRAM(s) Oral every 3 hours PRN Severe Pain (7 - 10)  simethicone 80 milliGRAM(s) Chew every 6 hours PRN Gas  sodium chloride 0.65% Nasal 1 Spray(s) Both Nostrils every 1 hour PRN Nasal Congestion      Physical Exam:  Constitutional: NAD  Pulmonary: clear to auscultation bilaterally, wheezes in left upper lobe noted; IS up to 1500   Cardiovascular: Regular rate and rhythm   Abdomen: soft, non-tender, non-distended, normal bowel sounds  Extremities: no lower extremity edema or calve tenderness, Guillermo's sign negative.  Incision: Clean, dry, intact with Prevena.  Without signs of infection or hernia.    LABS:                        9.1    16.43 )-----------( 611      ( 04 Dec 2022 05:15 )             29.9     12-04    138  |  98  |  12.1  ----------------------------<  90  4.4   |  28.0  |  0.71    Ca    8.8      04 Dec 2022 05:15  Phos  4.2     12-04  Mg     1.7     12-04    IMAGING:   < from: CT Angio Chest PE Protocol w/ IV Cont (12.02.22 @ 22:08) >  INTERPRETATION:  CLINICAL INFORMATION: Hypoxia question pulmonary embolism    COMPARISON: CT abdomen and pelvis 11/29/2022.    CONTRAST/COMPLICATIONS:  IV Contrast: Omnipaque 350  53 cc administered   47 cc discarded  Oral Contrast: NONE  Complications: None reported at time of study completion    PROCEDURE:  CT Angiography of the Chest.  Sagittal and coronal reformats were performedas well as 3D (MIP)   reconstructions.    FINDINGS:    LUNGS AND AIRWAYS: Patent central airways.  Mild bibasilar degenerative   changes.  PLEURA: No pleural effusion.  MEDIASTINUM AND MATT: No lymphadenopathy.  VESSELS: No pulmonary embolism. Limited evaluation of the distal   segmental and subsegmental areas due to poor opacification.  HEART: Heart size is normal. No pericardial effusion.  CHEST WALL AND LOWER NECK: Mild upper abdominal wall subcutaneous edema.  VISUALIZED UPPER ABDOMEN: Trace perihepatic fluid  BONES: Degenerative changes.      IMPRESSION:  No pulmonary embolism. Limited evaluation of the distal segmental and   subsegmental areas due to poor opacification.    VERTEBRAL BODY ANALYSIS: No Vertebral fracture or low bone density   identified.    < end of copied text >

## 2022-12-04 NOTE — PROGRESS NOTE ADULT - ASSESSMENT
52F with PMH of HTN , DM Type 2, STEPHANIE , obesity, uterine  fibroids presents to ED with 1 week of abdominal pain accompanied by vomiting and blood in the urine. Pain started 1 week prior and was initially located in the RUQ/RLQ, but had worsened and was diffuse. Pain described as stabbing and episodic; episodes lasted approx. 1 hour and were partially relieved by Tylenol/ibuprofen. Pt also reported first-time episode of blood in the urine, did not think it is vaginal bleeding. She reported no dysuria but reported increased urge. Pt unsure of LMP, stated she had irregular vaginal bleeding since initial fibroid diagnosis in 2015, sees Dr. Tillman outpatient and was referred to GYN ONC for a hysterectomy.  Pt endorsed multiple episodes of nonblood, nonbilious emesis, and 1 episode on Sunday along with decreased PO intake.  Patient now s/p exp lap, JOANNE BSO, abdominal washout with 2Liters pus drained and wound vac placed POD #3.    1. Large necrotic uterine fibroid with small foci of gas, with concern of infection   Patient with severe sepsis ( tachycardic/ hypotensive / fever / renal failure ) on 11/30 -    s/p emergent ex lap with JOANNE and BSO   Abdomen filled with 2L of purulent ascites with necrotic ruptured uterine fibroid .   Sepsis resolving no more tachycardia, white count is trending down  Blood culture negative to date, Urine culture negative,   ID is following   being continued with Zosyn , Vanco d/tracie   f/u pathology / OR cultures - growing Group B strep sensitivity pending   Trend WBC/Fever      2. STEPHANIE - due to sepsis - Metformin / Valsartan on hold - resolved  got ivf , avoid nephrotoxic medications , strict I and O ,       3. NIDDM - a1c- 6.6 - well controlled   continue to hold Metformin for now , ISSC , accu check , ADA when PO      4. Hx of HTN: On amlodipine and metoprolol with parameters     5. Hx of STEPHANIE / obesity/ Asthma - noted with episodes of desaturation down to 88% -   patient is asymptomatic - likely multifactorial due to Obesity / Hx of Asthma / ? STEPHANIE ,   LIKELY POSTOP ATELECTASIS   EKG - LVH otherwise normal - no sob/ no chest pain   CTA negative for PE   IS   now on RA      6. Acute on chronic anemia - Hgb 9.1 today, stable around that range   consider to transfuse if Hg< 7.5 , follow H/H in am     7. Chronic post nasal drip - continue Claritin and Flonase, feeling improvement    Will follow along with you.

## 2022-12-04 NOTE — PROGRESS NOTE ADULT - ASSESSMENT
52y  Female with h/o uterine fibroids presented to ED 11/29 with 1 week of diffuse abdominal pain. Patient reports pain was intermittent for 1 week prior to presentation with flare ups on 11/17, 11/20 and then 11/29. Pain initially located in the RUQ/RLQ, and worsened and is now diffuse. Pain is described as stabbing and episodic; episodes last approx. 1 hour and are partially relieved by Tylenol/ibuprofen. Associated with episode of blood in the urine on 11/30. She reports no dysuria but reports increased urge. Patient with hx of irregular vaginal bleeding since 2015. States menstrual cycle may last 7-10 day and not consistently every month. This has been since initial fibroid diagnosis in 2015. Pt sees Dr. Tillman outpatient and was referred to GYN ONC for a hysterectomy in May 2022 for finding of 18 wk size large fibroid uterus, but was unable to followup due to insurance issues.  In the ED, patient was found to be febrile 100.6 and worked up for Sepsis due to tachycardia and hypotension. She received 1L fluid bolus x2. S/p Zosyn q8, Vancomycin x1, Zofran; Urine, blood cx collected. CT abd/pelvis obtained showing: Markedly abnormal and enlarged uterus with suspected necrosis of a 16.3 cm right fundal uterine mass, uterus 24 x18 x18 on imaging. The mass contains small foci of gas, raising concern for superimposed infection. Abnormal small to moderate amount of abdominal and pelvic ascites with anterior peritoneal soft tissue stranding. Continued on Zosyn.       Infection of Necrotic uterine mass with Streptococcus agalactiae  s/p exp lap, JOANNE BSO, abdominal washout with 2Liters pus drained and wound vac placed 11/30  Fever  Leukocytosis   STEPHANIE      - Blood cultures 11/29 no growth  - RVP/COVID 19 PCR 11/30 negative   - Surgical culture reporting  Streptococcus agalactiae  - CT abd/pelvis obtained showing: Markedly abnormal and enlarged uterus with suspected necrosis of a 16.3 cm right fundal uterine mass, uterus 24 x18 x18 on imaging. The mass contains small foci of gas, raising concern for superimposed infection. Abnormal small to moderate amount of abdominal and pelvic ascites with anterior peritoneal soft tissue stranding.  - UA negative for UTI   - Continue Zosyn  - s/p exp lap, JOANNE BSO, abdominal washout with 2Liters pus drained and wound vac placed 11/30  - Plan to complete course of antibiotics 12/7 (7 days after source control)   - Follow up cultures  - Trend Fever  - Trend WBC      Will Follow    d/w Clinical pharmacy and RN

## 2022-12-04 NOTE — PROGRESS NOTE ADULT - ASSESSMENT
A/P: 51yo G0 LMP 11/15/22 with PMH of uterine fibroids and AUB admitted for sepsis due to fever, increased RR, and increased HR now with worsening leukocytosis. Now, POD#4 s/p ex-lap JOANNE, BSO, abdominal wash-out for pelvic mass and removal of 2 L ascites fluid.     Cardiac: Hx of HTN. BP medications ordered with strict hold parameters. BP WNL (107/68 - 137/76).  Pulmonary: Hx of asthma. Home medications ordered. Incentive spirometer at bedside. CT angio no PE on 12/3.   Neuro: Pain well controlled with current regimen.   Endo: Hx of DM. ISS ordered. Medicine consulted. Recommendations appreciated.   GI: Tolerating regular diet. Continue protonix.   : Voiding spontaneously. Adequate UOP. Preop creatinine 1.95 -> 1.23 >> 0.71.  ID: Patient on Zosyn IV due to Sepsis 2/2 infected Pelvic mass. Preop WBC 18 -> postop 16 -> 18.78 >>16.43. As per ID, continue Zosyn. Urine culture negative. Blood culture NGTD. Surgical culture positive for GBS.   Heme: Preop Hgb 9.4-> 8.0 -> 8.1 >> 9.1. SCDs when not ambulating. Lovenox for VTE prophylaxis.   Skin: No active disease. Dressing/wound with no signs of infection.   Psych: no active problems   FEN: Tolerating PO, no IVF. Electrolytes WNL this AM.   MSK: Encouraged ambulation. S/p PT assessment, appreciate recommendations.   Dispo: Continue inpatient management  Code Status: FULL

## 2022-12-05 ENCOUNTER — TRANSCRIPTION ENCOUNTER (OUTPATIENT)
Age: 52
End: 2022-12-05

## 2022-12-05 LAB
ANION GAP SERPL CALC-SCNC: 9 MMOL/L — SIGNIFICANT CHANGE UP (ref 5–17)
ANISOCYTOSIS BLD QL: SLIGHT — SIGNIFICANT CHANGE UP
BASOPHILS # BLD AUTO: 0 K/UL — SIGNIFICANT CHANGE UP (ref 0–0.2)
BASOPHILS NFR BLD AUTO: 0 % — SIGNIFICANT CHANGE UP (ref 0–2)
BLD GP AB SCN SERPL QL: SIGNIFICANT CHANGE UP
BUN SERPL-MCNC: 10.6 MG/DL — SIGNIFICANT CHANGE UP (ref 8–20)
CALCIUM SERPL-MCNC: 9 MG/DL — SIGNIFICANT CHANGE UP (ref 8.4–10.5)
CHLORIDE SERPL-SCNC: 97 MMOL/L — SIGNIFICANT CHANGE UP (ref 96–108)
CO2 SERPL-SCNC: 30 MMOL/L — HIGH (ref 22–29)
CREAT SERPL-MCNC: 0.66 MG/DL — SIGNIFICANT CHANGE UP (ref 0.5–1.3)
EGFR: 105 ML/MIN/1.73M2 — SIGNIFICANT CHANGE UP
EOSINOPHIL # BLD AUTO: 0.31 K/UL — SIGNIFICANT CHANGE UP (ref 0–0.5)
EOSINOPHIL NFR BLD AUTO: 1.8 % — SIGNIFICANT CHANGE UP (ref 0–6)
GIANT PLATELETS BLD QL SMEAR: PRESENT — SIGNIFICANT CHANGE UP
GLUCOSE BLDC GLUCOMTR-MCNC: 115 MG/DL — HIGH (ref 70–99)
GLUCOSE BLDC GLUCOMTR-MCNC: 116 MG/DL — HIGH (ref 70–99)
GLUCOSE BLDC GLUCOMTR-MCNC: 116 MG/DL — HIGH (ref 70–99)
GLUCOSE BLDC GLUCOMTR-MCNC: 125 MG/DL — HIGH (ref 70–99)
GLUCOSE SERPL-MCNC: 120 MG/DL — HIGH (ref 70–99)
HCT VFR BLD CALC: 30.4 % — LOW (ref 34.5–45)
HGB BLD-MCNC: 9.6 G/DL — LOW (ref 11.5–15.5)
HYPOCHROMIA BLD QL: SLIGHT — SIGNIFICANT CHANGE UP
LYMPHOCYTES # BLD AUTO: 11.7 % — LOW (ref 13–44)
LYMPHOCYTES # BLD AUTO: 2.03 K/UL — SIGNIFICANT CHANGE UP (ref 1–3.3)
MACROCYTES BLD QL: SLIGHT — SIGNIFICANT CHANGE UP
MAGNESIUM SERPL-MCNC: 1.6 MG/DL — SIGNIFICANT CHANGE UP (ref 1.6–2.6)
MANUAL SMEAR VERIFICATION: SIGNIFICANT CHANGE UP
MCHC RBC-ENTMCNC: 26.9 PG — LOW (ref 27–34)
MCHC RBC-ENTMCNC: 31.6 GM/DL — LOW (ref 32–36)
MCV RBC AUTO: 85.2 FL — SIGNIFICANT CHANGE UP (ref 80–100)
METAMYELOCYTES # FLD: 1.8 % — HIGH (ref 0–0)
MONOCYTES # BLD AUTO: 0.78 K/UL — SIGNIFICANT CHANGE UP (ref 0–0.9)
MONOCYTES NFR BLD AUTO: 4.5 % — SIGNIFICANT CHANGE UP (ref 2–14)
MYELOCYTES NFR BLD: 2.7 % — HIGH (ref 0–0)
NEUTROPHILS # BLD AUTO: 13.47 K/UL — HIGH (ref 1.8–7.4)
NEUTROPHILS NFR BLD AUTO: 73 % — SIGNIFICANT CHANGE UP (ref 43–77)
NEUTS BAND # BLD: 4.5 % — SIGNIFICANT CHANGE UP (ref 0–8)
NRBC # BLD: 1 /100 — HIGH (ref 0–0)
OVALOCYTES BLD QL SMEAR: SLIGHT — SIGNIFICANT CHANGE UP
PHOSPHATE SERPL-MCNC: 4.1 MG/DL — SIGNIFICANT CHANGE UP (ref 2.4–4.7)
PLAT MORPH BLD: NORMAL — SIGNIFICANT CHANGE UP
PLATELET # BLD AUTO: 595 K/UL — HIGH (ref 150–400)
POIKILOCYTOSIS BLD QL AUTO: SLIGHT — SIGNIFICANT CHANGE UP
POLYCHROMASIA BLD QL SMEAR: SLIGHT — SIGNIFICANT CHANGE UP
POTASSIUM SERPL-MCNC: 4.1 MMOL/L — SIGNIFICANT CHANGE UP (ref 3.5–5.3)
POTASSIUM SERPL-SCNC: 4.1 MMOL/L — SIGNIFICANT CHANGE UP (ref 3.5–5.3)
RBC # BLD: 3.57 M/UL — LOW (ref 3.8–5.2)
RBC # FLD: 16.4 % — HIGH (ref 10.3–14.5)
RBC BLD AUTO: ABNORMAL
SARS-COV-2 RNA SPEC QL NAA+PROBE: SIGNIFICANT CHANGE UP
SODIUM SERPL-SCNC: 136 MMOL/L — SIGNIFICANT CHANGE UP (ref 135–145)
WBC # BLD: 17.38 K/UL — HIGH (ref 3.8–10.5)
WBC # FLD AUTO: 17.38 K/UL — HIGH (ref 3.8–10.5)

## 2022-12-05 PROCEDURE — 99232 SBSQ HOSP IP/OBS MODERATE 35: CPT

## 2022-12-05 RX ORDER — MAGNESIUM SULFATE 500 MG/ML
2 VIAL (ML) INJECTION ONCE
Refills: 0 | Status: COMPLETED | OUTPATIENT
Start: 2022-12-05 | End: 2022-12-05

## 2022-12-05 RX ADMIN — POLYETHYLENE GLYCOL 3350 17 GRAM(S): 17 POWDER, FOR SOLUTION ORAL at 12:43

## 2022-12-05 RX ADMIN — PIPERACILLIN AND TAZOBACTAM 25 GRAM(S): 4; .5 INJECTION, POWDER, LYOPHILIZED, FOR SOLUTION INTRAVENOUS at 21:13

## 2022-12-05 RX ADMIN — ONDANSETRON 4 MILLIGRAM(S): 8 TABLET, FILM COATED ORAL at 13:28

## 2022-12-05 RX ADMIN — PANTOPRAZOLE SODIUM 20 MILLIGRAM(S): 20 TABLET, DELAYED RELEASE ORAL at 12:42

## 2022-12-05 RX ADMIN — PIPERACILLIN AND TAZOBACTAM 25 GRAM(S): 4; .5 INJECTION, POWDER, LYOPHILIZED, FOR SOLUTION INTRAVENOUS at 05:55

## 2022-12-05 RX ADMIN — MONTELUKAST 10 MILLIGRAM(S): 4 TABLET, CHEWABLE ORAL at 21:12

## 2022-12-05 RX ADMIN — Medication 1 SPRAY(S): at 00:28

## 2022-12-05 RX ADMIN — Medication 25 GRAM(S): at 12:43

## 2022-12-05 RX ADMIN — AMLODIPINE BESYLATE 10 MILLIGRAM(S): 2.5 TABLET ORAL at 05:48

## 2022-12-05 RX ADMIN — PIPERACILLIN AND TAZOBACTAM 25 GRAM(S): 4; .5 INJECTION, POWDER, LYOPHILIZED, FOR SOLUTION INTRAVENOUS at 16:02

## 2022-12-05 RX ADMIN — ATORVASTATIN CALCIUM 10 MILLIGRAM(S): 80 TABLET, FILM COATED ORAL at 21:12

## 2022-12-05 RX ADMIN — Medication 975 MILLIGRAM(S): at 12:42

## 2022-12-05 RX ADMIN — ENOXAPARIN SODIUM 40 MILLIGRAM(S): 100 INJECTION SUBCUTANEOUS at 12:42

## 2022-12-05 RX ADMIN — Medication 975 MILLIGRAM(S): at 13:10

## 2022-12-05 RX ADMIN — Medication 25 MILLIGRAM(S): at 05:49

## 2022-12-05 RX ADMIN — Medication 10 MILLIEQUIVALENT(S): at 12:42

## 2022-12-05 RX ADMIN — Medication 1 SPRAY(S): at 05:51

## 2022-12-05 RX ADMIN — ENOXAPARIN SODIUM 40 MILLIGRAM(S): 100 INJECTION SUBCUTANEOUS at 21:14

## 2022-12-05 RX ADMIN — LORATADINE 10 MILLIGRAM(S): 10 TABLET ORAL at 12:42

## 2022-12-05 NOTE — DISCHARGE NOTE NURSING/CASE MANAGEMENT/SOCIAL WORK - NSPROMEDSDISPOSITION_GEN_A_NUR
bedside Cosentyx Counseling:  I discussed with the patient the risks of Cosentyx including but not limited to worsening of Crohn's disease, immunosuppression, allergic reactions and infections.  The patient understands that monitoring is required including a PPD at baseline and must alert us or the primary physician if symptoms of infection or other concerning signs are noted.

## 2022-12-05 NOTE — DISCHARGE NOTE NURSING/CASE MANAGEMENT/SOCIAL WORK - NSDCFUADDAPPT_GEN_ALL_CORE_FT
Please followup in the office in 7 days for removal of staples incision. A PA will call you in 1 weeks to check in on your recovery. Follow up with Dr. Navarro in 2 weeks to review pathology report.      AFTER HOSPITAL DISCHARGE, THE FOLLOWING APPOINTMENTS ARE SCHEDULE FOR YOU:    1. PCP:   DR. THAYER  on   [address] [place] [address]  Phone: [phone]    2. CARDIOLOGY:  Dr. Ashley Bailey / APPOINTMENT W/ OFFICE PA  on FRIDAY, December 16,2022 AT 9:30 am  [address] [place] [address]  Phone: [phone]     Please followup in the office in 7 days for removal of staples incision. A PA will call you in 1 weeks to check in on your recovery. Follow up with Dr. Naavrro in 2 weeks to review pathology report.      AFTER HOSPITAL DISCHARGE, FOLLOW-UP WITH PCP AND CARDIOLOGY,   THE FOLLOWING  APPOINTMENTS HAS BEEN SCHEDULE FOR YOU:    1. PCP:   DR. THAYER  on TUESDAY, DECEMBER 13,2022 at 11am  [address] [place] [address]  Phone: [phone]    2. CARDIOLOGY:  Dr. Ashley Bailey / APPOINTMENT W/ OFFICE PA  on FRIDAY, December 16,2022 AT 9:30 am  [address] [place] [address]  Phone: [phone]

## 2022-12-05 NOTE — PROGRESS NOTE ADULT - SUBJECTIVE AND OBJECTIVE BOX
GYNECOLOGIC ONCOLOGY PROGRESS NOTE    POD#5  HOD: 7    PROBLEMS:      T(F): 99.3 (12-05-22 @ 04:00), Max: 99.3 (12-05-22 @ 04:00)  HR: 97 (12-05-22 @ 04:00) (82 - 100)  BP: 129/81 (12-05-22 @ 04:00) (108/68 - 129/81)  RR: 18 (12-05-22 @ 04:00) (17 - 18)  SpO2: 91% (12-05-22 @ 04:00) (91% - 98%)  Wt(kg): -- is seen and examined at bedside.     SUBJECTIVE:    Patient denies fevers, chills, night sweats.   Pain well-controlled.  Flatus: Yes  Denies Nausea, Vomiting or Diarrhea.  Denies shortness of breath, chest pain or dyspnea on exertion.  Tolerating diet.    OBJECTIVE:     VITALS:  T(F): 99.3 (12-05-22 @ 04:00), Max: 99.3 (12-05-22 @ 04:00)  HR: 97 (12-05-22 @ 04:00) (82 - 100)  BP: 129/81 (12-05-22 @ 04:00) (108/68 - 129/81)  RR: 18 (12-05-22 @ 04:00) (17 - 18)  SpO2: 91% (12-05-22 @ 04:00) (91% - 98%)  Wt(kg): --    I&O's Summary    03 Dec 2022 07:01  -  04 Dec 2022 07:00  --------------------------------------------------------  IN: 240 mL / OUT: 1000 mL / NET: -760 mL    04 Dec 2022 07:01  -  05 Dec 2022 06:59  --------------------------------------------------------  IN: 75 mL / OUT: 152 mL / NET: -77 mL        MEDICATIONS  (STANDING):  acetaminophen     Tablet .. 975 milliGRAM(s) Oral every 6 hours  amLODIPine   Tablet 10 milliGRAM(s) Oral daily  atorvastatin 10 milliGRAM(s) Oral at bedtime  dextrose 5%. 1000 milliLiter(s) (50 mL/Hr) IV Continuous <Continuous>  dextrose 5%. 1000 milliLiter(s) (100 mL/Hr) IV Continuous <Continuous>  dextrose 50% Injectable 25 Gram(s) IV Push once  dextrose 50% Injectable 12.5 Gram(s) IV Push once  dextrose 50% Injectable 25 Gram(s) IV Push once  enoxaparin Injectable 40 milliGRAM(s) SubCutaneous every 12 hours  fluticasone furoate/umeclidinium/vilanterol 100-62.5-25 MICROgram(s) Inhaler 1 Puff(s) Inhalation daily  fluticasone propionate 50 MICROgram(s)/spray Nasal Spray 1 Spray(s) Both Nostrils two times a day  glucagon  Injectable 1 milliGRAM(s) IntraMuscular once  insulin lispro (ADMELOG) corrective regimen sliding scale   SubCutaneous three times a day before meals  insulin lispro (ADMELOG) corrective regimen sliding scale   SubCutaneous at bedtime  lactated ringers Bolus 1000 milliLiter(s) IV Bolus once  loratadine 10 milliGRAM(s) Oral daily  metoprolol succinate ER 25 milliGRAM(s) Oral daily  montelukast 10 milliGRAM(s) Oral at bedtime  pantoprazole    Tablet 20 milliGRAM(s) Oral daily  piperacillin/tazobactam IVPB.. 3.375 Gram(s) IV Intermittent every 8 hours  polyethylene glycol 3350 17 Gram(s) Oral daily  potassium chloride    Tablet ER 10 milliEquivalent(s) Oral daily    MEDICATIONS  (PRN):  acetaminophen     Tablet .. 975 milliGRAM(s) Oral every 6 hours PRN Mild Pain (1 - 3)  albuterol    90 MICROgram(s) HFA Inhaler 2 Puff(s) Inhalation every 6 hours PRN Shortness of Breath and/or Wheezing  dextrose Oral Gel 15 Gram(s) Oral once PRN Blood Glucose LESS THAN 70 milliGRAM(s)/deciliter  mesalamine Suppository 1000 milliGRAM(s) Rectal at bedtime PRN ulcerative colitis  naloxone Injectable 0.1 milliGRAM(s) IV Push every 3 minutes PRN For ANY of the following changes in patient status:  A. RR LESS THAN 10 breaths per minute, B. Oxygen saturation LESS THAN 90%, C. Sedation score of 6  ondansetron    Tablet 8 milliGRAM(s) Oral every 8 hours PRN Nausea and/or Vomiting  ondansetron Injectable 4 milliGRAM(s) IV Push every 6 hours PRN Nausea  oxyCODONE    IR 5 milliGRAM(s) Oral every 3 hours PRN Moderate Pain (4 - 6)  oxyCODONE    IR 10 milliGRAM(s) Oral every 3 hours PRN Severe Pain (7 - 10)  simethicone 80 milliGRAM(s) Chew every 6 hours PRN Gas  sodium chloride 0.65% Nasal 1 Spray(s) Both Nostrils every 1 hour PRN Nasal Congestion      Physical Exam:  Constitutional: NAD  Pulmonary: clear to auscultation bilaterally, IS up to 1000  Cardiovascular: Regular rate and rhythm   Abdomen: soft, non-tender, non-distended, normal bowel sounds  Extremities: no lower extremity edema or calve tenderness, Guillermo's sign negative.  Incision: Clean, dry, intact with Prevena.  Without signs of infection or hernia.    LABS:                        9.1    16.43 )-----------( 611      ( 04 Dec 2022 05:15 )             29.9     12-04    138  |  98  |  12.1  ----------------------------<  90  4.4   |  28.0  |  0.71    Ca    8.8      04 Dec 2022 05:15  Phos  4.2     12-04  Mg     1.7     12-04

## 2022-12-05 NOTE — PROGRESS NOTE ADULT - ASSESSMENT
A/P: 51yo G0 LMP 11/15/22 with PMH of uterine fibroids and AUB admitted for sepsis due to fever, increased RR, and increased HR now with worsening leukocytosis. Now, POD#5 s/p ex-lap JOANNE, BSO, abdominal wash-out for pelvic mass and removal of 2 L ascites fluid.     A/P:   Cardiac: Hx of HTN. BP medications ordered with strict hold parameters. BP WNL   Pulmonary: Hx of asthma. Home medications ordered. Incentive spirometer at bedside. CT angio no PE on 12/3.   Neuro: Pain well controlled with current regimen.   Endo: Hx of DM. ISS ordered. Medicine consulted. Recommendations appreciated.   GI: Tolerating regular diet. Continue protonix.   : Voiding spontaneously. Adequate UOP. Preop creatinine 1.95 -> 1.23 >> 0.71 -> AM labs pending   ID: Patient on Zosyn IV due to Sepsis 2/2 infected Pelvic mass. Preop WBC 18 -> postop 16 -> 18.78 >>16.43. As per ID, continue Zosyn until 12/7. Urine culture negative. Blood culture negative, final. Surgical culture positive for GBS.   Heme: Preop Hgb 9.4-> 8.0 -> 8.1 >> 9.1 -> AM labs pending. SCDs when not ambulating. Lovenox for VTE prophylaxis.   Skin: No active disease. Dressing/wound with no signs of infection.   Psych: no active problems   FEN: Tolerating PO, no IVF. Electrolytes WNL this AM.   MSK: Encouraged ambulation. S/p PT assessment, appreciate recommendations.   Dispo: Continue inpatient management  Code Status: FULL

## 2022-12-05 NOTE — DISCHARGE NOTE NURSING/CASE MANAGEMENT/SOCIAL WORK - PATIENT PORTAL LINK FT
You can access the FollowMyHealth Patient Portal offered by [facility] by registering at the following website: [email or web address]. By joining [employer]’s FollowMyHealth portal, you will also be able to view your health information using other applications (apps) compatible with our system.

## 2022-12-05 NOTE — PROGRESS NOTE ADULT - SUBJECTIVE AND OBJECTIVE BOX
[facility]  INFECTIOUS DISEASES at [place] and [place]  =======================================================                               Tien Moore MD#   Camille Bateman MD*                             Antonia Mixon MD*   Yola Redmond MD*            Diplomates American Board of Internal Medicine & Infectious Diseases                # [place] Office - Appt - Tel  [phone] Fax [phone]                * [place] Office - Appt - Tel [phone] Fax [phone]                                  Hospital Consult line:  [phone]  =======================================================    LORENA DUNCAN [id number]    Follow up: Pelvic infection    s/p exp lap, JOANNE BSO, abdominal washout with 2Liters pus drained and wound vac placed 11/30      Allergies:  codeine (Nausea)  IV Contrast (Other; Pruritus; Hives)       REVIEW OF SYSTEMS:  CONSTITUTIONAL:  No Fever or chills  HEENT:  No diplopia or blurred vision.  No earache, sore throat or runny nose.  CARDIOVASCULAR:  No chest pain  RESPIRATORY:  No cough, shortness of breath  GASTROINTESTINAL:  No nausea, vomiting or diarrhea. + Abd pain   GENITOURINARY:  No dysuria, frequency or urgency. + Blood in urine  MUSCULOSKELETAL:  no joint aches, no muscle pain  SKIN:  No change in skin, hair or nails.  NEUROLOGIC:  No Headaches, seizures  PSYCHIATRIC:  No disorder of thought or mood.  ENDOCRINE:  No heat or cold intolerance  HEMATOLOGICAL:  No easy bruising or bleeding.       Physical Exam:  GEN: NAD  HEENT: normocephalic and atraumatic. EOMI. PERRL.    NECK: Supple.   LUNGS: CTA B/L.  HEART: RRR  ABDOMEN: Soft, Obese.  +BS.  Diffuse tenderness, No rebound.   : No CVA tenderness  EXTREMITIES: Without  edema.  MSK: No joint swelling  NEUROLOGIC: No Focal Deficits   PSYCHIATRIC: Appropriate affect .  SKIN: No rash      Vitals:  T(F): 97.3 (05 Dec 2022 07:20), Max: 99.3 (05 Dec 2022 04:00)  HR: 92 (05 Dec 2022 07:20)  BP: 101/69 (05 Dec 2022 07:20)  RR: 18 (05 Dec 2022 07:20)  SpO2: 93% (05 Dec 2022 07:20) (91% - 98%)  temp max in last 48H T(F): , Max: 99.3 (12-05-22 @ 04:00)    Current Antibiotics:  piperacillin/tazobactam IVPB.. 3.375 Gram(s) IV Intermittent every 8 hours    Other medications:  acetaminophen     Tablet .. 975 milliGRAM(s) Oral every 6 hours  amLODIPine   Tablet 10 milliGRAM(s) Oral daily  atorvastatin 10 milliGRAM(s) Oral at bedtime  dextrose 5%. 1000 milliLiter(s) IV Continuous <Continuous>  dextrose 5%. 1000 milliLiter(s) IV Continuous <Continuous>  dextrose 50% Injectable 25 Gram(s) IV Push once  dextrose 50% Injectable 12.5 Gram(s) IV Push once  dextrose 50% Injectable 25 Gram(s) IV Push once  enoxaparin Injectable 40 milliGRAM(s) SubCutaneous every 12 hours  fluticasone furoate/umeclidinium/vilanterol 100-62.5-25 MICROgram(s) Inhaler 1 Puff(s) Inhalation daily  fluticasone propionate 50 MICROgram(s)/spray Nasal Spray 1 Spray(s) Both Nostrils two times a day  glucagon  Injectable 1 milliGRAM(s) IntraMuscular once  insulin lispro (ADMELOG) corrective regimen sliding scale   SubCutaneous three times a day before meals  insulin lispro (ADMELOG) corrective regimen sliding scale   SubCutaneous at bedtime  lactated ringers Bolus 1000 milliLiter(s) IV Bolus once  loratadine 10 milliGRAM(s) Oral daily  metoprolol succinate ER 25 milliGRAM(s) Oral daily  montelukast 10 milliGRAM(s) Oral at bedtime  pantoprazole    Tablet 20 milliGRAM(s) Oral daily  polyethylene glycol 3350 17 Gram(s) Oral daily  potassium chloride    Tablet ER 10 milliEquivalent(s) Oral daily                            9.6    17.38 )-----------( 595      ( 05 Dec 2022 06:36 )             30.4     12-05    136  |  97  |  10.6  ----------------------------<  120<H>  4.1   |  30.0<H>  |  0.66    Ca    9.0      05 Dec 2022 06:36  Phos  4.1     12-05  Mg     1.6     12-05      RECENT CULTURES:  11-30 @ 19:00 .Surgical Swab Uterine Culture     Moderate Streptococcus agalactiae (Group B)  Streptococcus agalactiae (Group B) isolated  Group B streptococci are susceptible to ampicillin,  penicillin and cefazolin, but may be resistant to  erythromycin and clindamycin.  Recommendations for intrapartum prophylaxis for Group B  streptococci are penicillin or ampicillin.    11[facility]30 @ 02:59    [facility]    11-29 @ 19:02 Clean Catch Clean Catch (Midstream)     <10,000 CFU/mL Normal Urogenital Vivian    11-29 @ 12:45 .Blood Blood     No Growth Final    11-29 @ 12:35 .Blood Blood     No Growth Final      WBC Count: 17.38 K/uL (12-05-22 @ 06:36)  WBC Count: 16.43 K/uL (12-04-22 @ 05:15)  WBC Count: 17.26 K/uL (12-03-22 @ 11:48)  WBC Count: 18.78 K/uL (12-02-22 @ 06:09)  WBC Count: 16.00 K/uL (12-01-22 @ 05:45)  WBC Count: 18.12 K/uL (11-30-22 @ 16:35)    Creatinine, Serum: 0.66 mg/dL (12-05-22 @ 06:36)  Creatinine, Serum: 0.71 mg/dL (12-04-22 @ 05:15)  Creatinine, Serum: 0.77 mg/dL (12-03-22 @ 11:48)  Creatinine, Serum: 0.83 mg/dL (12-02-22 @ 06:09)  Creatinine, Serum: 1.23 mg/dL (12-01-22 @ 05:45)  Creatinine, Serum: 1.95 mg/dL (11-30-22 @ 13:15)    SARS-CoV-2: NotDetec (11-30-22 @ 02:59)        < from: CT Angio Chest PE Protocol w/ IV Cont (12.02.22 @ 22:08) >  ACC: [id number] EXAM:  CT ANGIO CHEST PULM SUZI LIMA                          PROCEDURE DATE:  12/02/2022      INTERPRETATION:  CLINICAL INFORMATION: Hypoxia question pulmonary embolism    COMPARISON: CT abdomen and pelvis 11/29/2022.    CONTRAST/COMPLICATIONS:  IV Contrast: Omnipaque 350  53 cc administered   47 cc discarded  Oral Contrast: NONE  Complications: None reported at time of study completion    PROCEDURE:  CT Angiography of the Chest.  Sagittal and coronal reformats were performedas well as 3D (MIP)   reconstructions.    FINDINGS:    LUNGS AND AIRWAYS: Patent central airways.  Mild bibasilar degenerative   changes.  PLEURA: No pleural effusion.  MEDIASTINUM AND MATT: No lymphadenopathy.  VESSELS: No pulmonary embolism. Limited evaluation of the distal   segmental and subsegmental areas due to poor opacification.  HEART: Heart size is normal. No pericardial effusion.  CHEST WALL AND LOWER NECK: Mild upper abdominal wall subcutaneous edema.  VISUALIZED UPPER ABDOMEN: Trace perihepatic fluid  BONES: Degenerative changes.    IMPRESSION:  No pulmonary embolism. Limited evaluation of the distal segmental and   subsegmental areas due to poor opacification.    VERTEBRAL BODY ANALYSIS: No Vertebral fracture or low bone density   identified.    --- End of Report ---  < end of copied text >

## 2022-12-05 NOTE — PROGRESS NOTE ADULT - ASSESSMENT
52y  Female with h/o uterine fibroids presented to ED 11/29 with 1 week of diffuse abdominal pain. Patient reports pain was intermittent for 1 week prior to presentation with flare ups on 11/17, 11/20 and then 11/29. Pain initially located in the RUQ/RLQ, and worsened and is now diffuse. Pain is described as stabbing and episodic; episodes last approx. 1 hour and are partially relieved by Tylenol/ibuprofen. Associated with episode of blood in the urine on 11/30. She reports no dysuria but reports increased urge. Patient with hx of irregular vaginal bleeding since 2015. States menstrual cycle may last 7-10 day and not consistently every month. This has been since initial fibroid diagnosis in 2015. Pt sees Dr. Tillman outpatient and was referred to GYN ONC for a hysterectomy in May 2022 for finding of 18 wk size large fibroid uterus, but was unable to followup due to insurance issues.  In the ED, patient was found to be febrile 100.6 and worked up for Sepsis due to tachycardia and hypotension. She received 1L fluid bolus x2. S/p Zosyn q8, Vancomycin x1, Zofran; Urine, blood cx collected. CT abd/pelvis obtained showing: Markedly abnormal and enlarged uterus with suspected necrosis of a 16.3 cm right fundal uterine mass, uterus 24 x18 x18 on imaging. The mass contains small foci of gas, raising concern for superimposed infection. Abnormal small to moderate amount of abdominal and pelvic ascites with anterior peritoneal soft tissue stranding. Continued on Zosyn.       Infection of Necrotic uterine mass with Streptococcus agalactiae  s/p exp lap, JOANNE BSO, abdominal washout with 2Liters pus drained and wound vac placed 11/30  Fever  Leukocytosis   STEPHANIE      - Blood cultures 11/29 no growth  - RVP/COVID 19 PCR 11/30 negative   - Surgical culture reporting  Streptococcus agalactiae  - CT abd/pelvis obtained showing: Markedly abnormal and enlarged uterus with suspected necrosis of a 16.3 cm right fundal uterine mass, uterus 24 x18 x18 on imaging. The mass contains small foci of gas, raising concern for superimposed infection. Abnormal small to moderate amount of abdominal and pelvic ascites with anterior peritoneal soft tissue stranding.  - UA negative for UTI   - Continue Zosyn  - s/p exp lap, JOANNE BSO, abdominal washout with 2Liters pus drained and wound vac placed 11/30  - Plan to complete course of antibiotics 12/7 (7 days after source control)   - Follow up cultures  - Trend Fever  - Trend WBC      Will Follow    d/w Clinical pharmacy

## 2022-12-05 NOTE — PROGRESS NOTE ADULT - ASSESSMENT
52F with PMH of HTN , DM Type 2, STEPHANIE , obesity, uterine  fibroids presents to ED with 1 week of abdominal pain accompanied by vomiting and blood in the urine. Pain started 1 week prior and was initially located in the RUQ/RLQ, but had worsened and was diffuse. Pain described as stabbing and episodic; episodes lasted approx. 1 hour and were partially relieved by Tylenol/ibuprofen. Pt also reported first-time episode of blood in the urine, did not think it is vaginal bleeding. She reported no dysuria but reported increased urge. Pt unsure of LMP, stated she had irregular vaginal bleeding since initial fibroid diagnosis in 2015, sees Dr. Tillman outpatient and was referred to GYN ONC for a hysterectomy.  Pt endorsed multiple episodes of nonblood, nonbilious emesis, and 1 episode on Sunday along with decreased PO intake.  Patient now s/p exp lap, JOANNE BSO, abdominal washout with 2Liters pus drained and wound vac placed POD #3.    1. Large necrotic uterine fibroid with small foci of gas, with concern of infection:     Patient with severe sepsis ( tachycardic/ hypotensive / fever / renal failure ) on 11/30 s/p emergent ex lap with JOANNE and BSO   Abdomen filled with 2L of purulent ascites with necrotic ruptured uterine fibroid .   Sepsis resolving no more tachycardia, white count is trending down  Blood culture negative to date, Urine culture negative,   ID is following   being continued with Zosyn , Vanco d/tracie   f/u pathology / OR cultures - growing Group B strep sensitivity pending   Has Leucocytosis  Trend WBC/Fever    2. STEPHANIE - due to sepsis - Metformin / Valsartan on hold - resolved  got ivf , avoid nephrotoxic medications , strict I and O      3. NIDDM - a1c- 6.6 - well controlled   continue to hold Metformin for now , ISSC , accu check , ADA when PO      4. Hx of HTN: On amlodipine and metoprolol with parameters     5. Hx of STEPHANIE / obesity/ Asthma - noted with episodes of desaturation down to 88% -   patient is asymptomatic - likely multifactorial due to Obesity / Hx of Asthma / ? STEPHANIE ,   LIKELY POSTOP ATELECTASIS   EKG - LVH otherwise normal - no sob/ no chest pain   CTA negative for PE   IS   now on RA      6. Acute on chronic anemia - stable around 9 range   consider to transfuse if Hg< 7.5     7. Chronic post nasal drip - continue Claritin and Flonase, feeling improvement    8. HLD: Statin     9. Obesity: Dietitian consult     Medicine team is signing off, please call as needed, has leucocytosis and is on antibiotics, please monitor CBC and follow ID for Antibiotics recommendations.

## 2022-12-05 NOTE — PROGRESS NOTE ADULT - SUBJECTIVE AND OBJECTIVE BOX
LORENA DUNCAN    [id number]    52y      Female    Patient is a 52y old  Female who presents with a chief complaint of Diffuse abdominal pain (05 Dec 2022 06:58)      INTERVAL HPI/OVERNIGHT EVENTS:    Patient abdominal/pelvic pain is better with pain meds,  denies fever, chills, chest pain, nausea, vomiting.     REVIEW OF SYSTEMS:    CONSTITUTIONAL: No fever, some fatigue  RESPIRATORY: No cough, No shortness of breath  CARDIOVASCULAR: No chest pain, palpitations  GASTROINTESTINAL: mild lower abdominal pain, some nausea, no vomiting  NEUROLOGICAL: No headaches,  loss of strength.  MISCELLANEOUS: No joint swelling or pain      Vital Signs Last 24 Hrs  T(C): 36.3 (05 Dec 2022 07:20), Max: 37.4 (05 Dec 2022 04:00)  T(F): 97.3 (05 Dec 2022 07:20), Max: 99.3 (05 Dec 2022 04:00)  HR: 92 (05 Dec 2022 07:20) (84 - 100)  BP: 101/69 (05 Dec 2022 07:20) (101/69 - 129/81)  BP(mean): 94 (04 Dec 2022 20:00) (94 - 94)  RR: 18 (05 Dec 2022 07:20) (18 - 18)  SpO2: 93% (05 Dec 2022 07:20) (91% - 98%)    Parameters below as of 05 Dec 2022 07:20  Patient On (Oxygen Delivery Method): room air        PHYSICAL EXAM:    GENERAL: Middle age female looking comfortable    HEENT: PERRL, +EOMI  NECK: soft, Supple, No JVD  CHEST/LUNG: Clear to auscultate bilaterally; No wheezing  HEART: S1S2+, Regular rate and rhythm; No murmurs  ABDOMEN: Soft, Nontender, Nondistended; Bowel sounds present  EXTREMITIES:  1+ Peripheral Pulses, No edema  SKIN: No rashes or lesions  NEURO: AAOX3  PSYCH: normal mood      LABS:                        9.6    17.38 )-----------( 595      ( 05 Dec 2022 06:36 )             30.4     12-05    136  |  97  |  10.6  ----------------------------<  120<H>  4.1   |  30.0<H>  |  0.66    Ca    9.0      05 Dec 2022 06:36  Phos  4.1     12-05  Mg     1.6     12-05              I&O's Summary    04 Dec 2022 07:01  -  05 Dec 2022 07:00  --------------------------------------------------------  IN: 75 mL / OUT: 152 mL / NET: -77 mL        MEDICATIONS  (STANDING):  acetaminophen     Tablet .. 975 milliGRAM(s) Oral every 6 hours  amLODIPine   Tablet 10 milliGRAM(s) Oral daily  atorvastatin 10 milliGRAM(s) Oral at bedtime  dextrose 5%. 1000 milliLiter(s) (50 mL/Hr) IV Continuous <Continuous>  dextrose 5%. 1000 milliLiter(s) (100 mL/Hr) IV Continuous <Continuous>  dextrose 50% Injectable 25 Gram(s) IV Push once  dextrose 50% Injectable 12.5 Gram(s) IV Push once  dextrose 50% Injectable 25 Gram(s) IV Push once  enoxaparin Injectable 40 milliGRAM(s) SubCutaneous every 12 hours  fluticasone furoate/umeclidinium/vilanterol 100-62.5-25 MICROgram(s) Inhaler 1 Puff(s) Inhalation daily  fluticasone propionate 50 MICROgram(s)/spray Nasal Spray 1 Spray(s) Both Nostrils two times a day  glucagon  Injectable 1 milliGRAM(s) IntraMuscular once  insulin lispro (ADMELOG) corrective regimen sliding scale   SubCutaneous three times a day before meals  insulin lispro (ADMELOG) corrective regimen sliding scale   SubCutaneous at bedtime  lactated ringers Bolus 1000 milliLiter(s) IV Bolus once  loratadine 10 milliGRAM(s) Oral daily  magnesium sulfate  IVPB 2 Gram(s) IV Intermittent once  metoprolol succinate ER 25 milliGRAM(s) Oral daily  montelukast 10 milliGRAM(s) Oral at bedtime  pantoprazole    Tablet 20 milliGRAM(s) Oral daily  piperacillin/tazobactam IVPB.. 3.375 Gram(s) IV Intermittent every 8 hours  polyethylene glycol 3350 17 Gram(s) Oral daily  potassium chloride    Tablet ER 10 milliEquivalent(s) Oral daily    MEDICATIONS  (PRN):  acetaminophen     Tablet .. 975 milliGRAM(s) Oral every 6 hours PRN Mild Pain (1 - 3)  albuterol    90 MICROgram(s) HFA Inhaler 2 Puff(s) Inhalation every 6 hours PRN Shortness of Breath and/or Wheezing  dextrose Oral Gel 15 Gram(s) Oral once PRN Blood Glucose LESS THAN 70 milliGRAM(s)/deciliter  mesalamine Suppository 1000 milliGRAM(s) Rectal at bedtime PRN ulcerative colitis  naloxone Injectable 0.1 milliGRAM(s) IV Push every 3 minutes PRN For ANY of the following changes in patient status:  A. RR LESS THAN 10 breaths per minute, B. Oxygen saturation LESS THAN 90%, C. Sedation score of 6  ondansetron    Tablet 8 milliGRAM(s) Oral every 8 hours PRN Nausea and/or Vomiting  ondansetron Injectable 4 milliGRAM(s) IV Push every 6 hours PRN Nausea  oxyCODONE    IR 5 milliGRAM(s) Oral every 3 hours PRN Moderate Pain (4 - 6)  oxyCODONE    IR 10 milliGRAM(s) Oral every 3 hours PRN Severe Pain (7 - 10)  simethicone 80 milliGRAM(s) Chew every 6 hours PRN Gas  sodium chloride 0.65% Nasal 1 Spray(s) Both Nostrils every 1 hour PRN Nasal Congestion

## 2022-12-06 LAB
ANION GAP SERPL CALC-SCNC: 9 MMOL/L — SIGNIFICANT CHANGE UP (ref 5–17)
BASOPHILS # BLD AUTO: 0.07 K/UL — SIGNIFICANT CHANGE UP (ref 0–0.2)
BASOPHILS NFR BLD AUTO: 0.4 % — SIGNIFICANT CHANGE UP (ref 0–2)
BUN SERPL-MCNC: 10.8 MG/DL — SIGNIFICANT CHANGE UP (ref 8–20)
CALCIUM SERPL-MCNC: 8.9 MG/DL — SIGNIFICANT CHANGE UP (ref 8.4–10.5)
CHLORIDE SERPL-SCNC: 97 MMOL/L — SIGNIFICANT CHANGE UP (ref 96–108)
CO2 SERPL-SCNC: 29 MMOL/L — SIGNIFICANT CHANGE UP (ref 22–29)
CREAT SERPL-MCNC: 0.77 MG/DL — SIGNIFICANT CHANGE UP (ref 0.5–1.3)
CULTURE RESULTS: SIGNIFICANT CHANGE UP
EGFR: 92 ML/MIN/1.73M2 — SIGNIFICANT CHANGE UP
EOSINOPHIL # BLD AUTO: 0.32 K/UL — SIGNIFICANT CHANGE UP (ref 0–0.5)
EOSINOPHIL NFR BLD AUTO: 1.9 % — SIGNIFICANT CHANGE UP (ref 0–6)
GLUCOSE BLDC GLUCOMTR-MCNC: 109 MG/DL — HIGH (ref 70–99)
GLUCOSE BLDC GLUCOMTR-MCNC: 122 MG/DL — HIGH (ref 70–99)
GLUCOSE BLDC GLUCOMTR-MCNC: 127 MG/DL — HIGH (ref 70–99)
GLUCOSE BLDC GLUCOMTR-MCNC: 140 MG/DL — HIGH (ref 70–99)
GLUCOSE SERPL-MCNC: 117 MG/DL — HIGH (ref 70–99)
HCT VFR BLD CALC: 29.7 % — LOW (ref 34.5–45)
HGB BLD-MCNC: 9.1 G/DL — LOW (ref 11.5–15.5)
IMM GRANULOCYTES NFR BLD AUTO: 10.9 % — HIGH (ref 0–0.9)
LYMPHOCYTES # BLD AUTO: 17.9 % — SIGNIFICANT CHANGE UP (ref 13–44)
LYMPHOCYTES # BLD AUTO: 2.96 K/UL — SIGNIFICANT CHANGE UP (ref 1–3.3)
MAGNESIUM SERPL-MCNC: 1.9 MG/DL — SIGNIFICANT CHANGE UP (ref 1.6–2.6)
MCHC RBC-ENTMCNC: 26 PG — LOW (ref 27–34)
MCHC RBC-ENTMCNC: 30.6 GM/DL — LOW (ref 32–36)
MCV RBC AUTO: 84.9 FL — SIGNIFICANT CHANGE UP (ref 80–100)
MONOCYTES # BLD AUTO: 0.98 K/UL — HIGH (ref 0–0.9)
MONOCYTES NFR BLD AUTO: 5.9 % — SIGNIFICANT CHANGE UP (ref 2–14)
NEUTROPHILS # BLD AUTO: 10.42 K/UL — HIGH (ref 1.8–7.4)
NEUTROPHILS NFR BLD AUTO: 63 % — SIGNIFICANT CHANGE UP (ref 43–77)
PHOSPHATE SERPL-MCNC: 3.6 MG/DL — SIGNIFICANT CHANGE UP (ref 2.4–4.7)
PLATELET # BLD AUTO: 619 K/UL — HIGH (ref 150–400)
POTASSIUM SERPL-MCNC: 4 MMOL/L — SIGNIFICANT CHANGE UP (ref 3.5–5.3)
POTASSIUM SERPL-SCNC: 4 MMOL/L — SIGNIFICANT CHANGE UP (ref 3.5–5.3)
RBC # BLD: 3.5 M/UL — LOW (ref 3.8–5.2)
RBC # FLD: 16.6 % — HIGH (ref 10.3–14.5)
SODIUM SERPL-SCNC: 135 MMOL/L — SIGNIFICANT CHANGE UP (ref 135–145)
SPECIMEN SOURCE: SIGNIFICANT CHANGE UP
WBC # BLD: 16.55 K/UL — HIGH (ref 3.8–10.5)
WBC # FLD AUTO: 16.55 K/UL — HIGH (ref 3.8–10.5)

## 2022-12-06 PROCEDURE — 99232 SBSQ HOSP IP/OBS MODERATE 35: CPT

## 2022-12-06 RX ADMIN — MONTELUKAST 10 MILLIGRAM(S): 4 TABLET, CHEWABLE ORAL at 23:22

## 2022-12-06 RX ADMIN — POLYETHYLENE GLYCOL 3350 17 GRAM(S): 17 POWDER, FOR SOLUTION ORAL at 12:05

## 2022-12-06 RX ADMIN — ATORVASTATIN CALCIUM 10 MILLIGRAM(S): 80 TABLET, FILM COATED ORAL at 23:23

## 2022-12-06 RX ADMIN — OXYCODONE HYDROCHLORIDE 10 MILLIGRAM(S): 5 TABLET ORAL at 17:59

## 2022-12-06 RX ADMIN — Medication 1 SPRAY(S): at 17:59

## 2022-12-06 RX ADMIN — OXYCODONE HYDROCHLORIDE 10 MILLIGRAM(S): 5 TABLET ORAL at 23:36

## 2022-12-06 RX ADMIN — Medication 0: at 23:24

## 2022-12-06 RX ADMIN — PIPERACILLIN AND TAZOBACTAM 25 GRAM(S): 4; .5 INJECTION, POWDER, LYOPHILIZED, FOR SOLUTION INTRAVENOUS at 06:49

## 2022-12-06 RX ADMIN — SIMETHICONE 80 MILLIGRAM(S): 80 TABLET, CHEWABLE ORAL at 12:04

## 2022-12-06 RX ADMIN — ENOXAPARIN SODIUM 40 MILLIGRAM(S): 100 INJECTION SUBCUTANEOUS at 23:23

## 2022-12-06 RX ADMIN — PIPERACILLIN AND TAZOBACTAM 25 GRAM(S): 4; .5 INJECTION, POWDER, LYOPHILIZED, FOR SOLUTION INTRAVENOUS at 13:42

## 2022-12-06 RX ADMIN — Medication 1 SPRAY(S): at 06:45

## 2022-12-06 RX ADMIN — PIPERACILLIN AND TAZOBACTAM 25 GRAM(S): 4; .5 INJECTION, POWDER, LYOPHILIZED, FOR SOLUTION INTRAVENOUS at 23:22

## 2022-12-06 RX ADMIN — LORATADINE 10 MILLIGRAM(S): 10 TABLET ORAL at 12:04

## 2022-12-06 RX ADMIN — Medication 10 MILLIEQUIVALENT(S): at 12:04

## 2022-12-06 RX ADMIN — PANTOPRAZOLE SODIUM 20 MILLIGRAM(S): 20 TABLET, DELAYED RELEASE ORAL at 12:04

## 2022-12-06 RX ADMIN — ENOXAPARIN SODIUM 40 MILLIGRAM(S): 100 INJECTION SUBCUTANEOUS at 09:20

## 2022-12-06 NOTE — DIETITIAN INITIAL EVALUATION ADULT - ORAL INTAKE PTA/DIET HISTORY
Pt reports poor PO intake and diet tolerance PTA with approx 7 lb weight loss, but pt had recently gained approx 20lbs prior to that. Current intake is poor, observed <25% lunch tray consumed.

## 2022-12-06 NOTE — PROGRESS NOTE ADULT - ASSESSMENT
A/P: 53yo G0 LMP 11/15/22 with PMH of uterine fibroids and AUB admitted for sepsis due to fever, increased RR, and increased HR now with worsening leukocytosis. Now, POD#6 s/p ex-lap JOANNE, BSO, abdominal wash-out for pelvic mass and removal of 2 L ascites fluid.     A/P:   Cardiac: Hx of HTN. BP medications ordered with strict hold parameters. BP WNL   Pulmonary: Hx of asthma. Home medications ordered. Incentive spirometer at bedside. CT angio no PE on 12/3.  During AM rounds, Pulse Ox seen saturating at 96% on RA. Patient denies diagnosis of STEPHANIE, however, patient may have component of this.   Neuro: Pain well controlled with current regimen.   Endo: Hx of DM. ISS ordered. Medicine following.   GI: Tolerating regular diet. Continue protonix.   : Voiding spontaneously. Adequate UOP. Preop creatinine 1.95 -> 1.23 >> 0.71 -> 0.77  ID: Patient on Zosyn IV due to Sepsis 2/2 infected Pelvic mass. Preop WBC 18 -> postop 16 -> 18.78 >>16.43 > 16.55. As per ID, continue Zosyn until 12/7. Urine culture negative. Blood culture negative, final. Surgical culture positive for GBS.   Heme: Preop Hgb 9.4-> 8.0 -> 8.1 >> 9.1 ->9.6 -> 9.1 SCDs when not ambulating. Lovenox for VTE prophylaxis.   Skin: No active disease. Dressing/wound with no signs of infection.   Psych: no active problems   FEN: Tolerating PO, no IVF. Electrolytes WNL this AM.   MSK: Encouraged ambulation. S/p PT assessment, appreciate recommendations.   Dispo: Continue inpatient management  Code Status: FULL

## 2022-12-06 NOTE — DIETITIAN INITIAL EVALUATION ADULT - PERTINENT LABORATORY DATA
12-06    135  |  97  |  10.8  ----------------------------<  117<H>  4.0   |  29.0  |  0.77    Ca    8.9      06 Dec 2022 05:06  Phos  3.6     12-06  Mg     1.9     12-06    POCT Blood Glucose.: 127 mg/dL (12-06-22 @ 12:02)  A1C with Estimated Average Glucose Result: 6.6 % (11-30-22 @ 13:15)

## 2022-12-06 NOTE — DIETITIAN NUTRITION RISK NOTIFICATION - TREATMENT: THE FOLLOWING DIET HAS BEEN RECOMMENDED
Diet, Regular:   Consistent Carbohydrate {Evening Snack} (CSTCHOSN)  DASH/TLC {Sodium & Cholesterol Restricted} (DASH) (11-30-22 @ 20:44) [Active]

## 2022-12-06 NOTE — PROGRESS NOTE ADULT - SUBJECTIVE AND OBJECTIVE BOX
GYNECOLOGIC ONCOLOGY PROGRESS NOTE    POD#6  HD: 8    PROBLEMS:      T(F): 98.1 (12-06-22 @ 04:00), Max: 98.6 (12-06-22 @ 00:00)  HR: 92 (12-06-22 @ 04:00) (87 - 97)  BP: 122/73 (12-06-22 @ 04:00) (101/69 - 128/75)  RR: 18 (12-06-22 @ 04:00) (18 - 18)  SpO2: 91% (12-06-22 @ 04:00) (91% - 95%)  Wt(kg): -- is seen and examined at bedside.     SUBJECTIVE:    Patient denies fevers, chills. She states she has been feeling warm overnight, which is usual for her.   Endorsing cramping pain during Valsalva while defecating. Otherwise, pain well controlled.   Flatus: Yes  Denies Nausea, Vomiting or Diarrhea.  Denies shortness of breath, chest pain or dyspnea on exertion.  Tolerating diet.    OBJECTIVE:     VITALS:  T(F): 98.1 (12-06-22 @ 04:00), Max: 98.6 (12-06-22 @ 00:00)  HR: 92 (12-06-22 @ 04:00) (87 - 97)  BP: 122/73 (12-06-22 @ 04:00) (101/69 - 128/75)  RR: 18 (12-06-22 @ 04:00) (18 - 18)  SpO2: 91% (12-06-22 @ 04:00) (91% - 95%)  Wt(kg): --    I&O's Summary      MEDICATIONS  (STANDING):  acetaminophen     Tablet .. 975 milliGRAM(s) Oral every 6 hours  amLODIPine   Tablet 10 milliGRAM(s) Oral daily  atorvastatin 10 milliGRAM(s) Oral at bedtime  dextrose 5%. 1000 milliLiter(s) (50 mL/Hr) IV Continuous <Continuous>  dextrose 5%. 1000 milliLiter(s) (100 mL/Hr) IV Continuous <Continuous>  dextrose 50% Injectable 25 Gram(s) IV Push once  dextrose 50% Injectable 12.5 Gram(s) IV Push once  dextrose 50% Injectable 25 Gram(s) IV Push once  enoxaparin Injectable 40 milliGRAM(s) SubCutaneous every 12 hours  fluticasone furoate/umeclidinium/vilanterol 100-62.5-25 MICROgram(s) Inhaler 1 Puff(s) Inhalation daily  fluticasone propionate 50 MICROgram(s)/spray Nasal Spray 1 Spray(s) Both Nostrils two times a day  glucagon  Injectable 1 milliGRAM(s) IntraMuscular once  insulin lispro (ADMELOG) corrective regimen sliding scale   SubCutaneous three times a day before meals  insulin lispro (ADMELOG) corrective regimen sliding scale   SubCutaneous at bedtime  lactated ringers Bolus 1000 milliLiter(s) IV Bolus once  loratadine 10 milliGRAM(s) Oral daily  metoprolol succinate ER 25 milliGRAM(s) Oral daily  montelukast 10 milliGRAM(s) Oral at bedtime  pantoprazole    Tablet 20 milliGRAM(s) Oral daily  piperacillin/tazobactam IVPB.. 3.375 Gram(s) IV Intermittent every 8 hours  polyethylene glycol 3350 17 Gram(s) Oral daily  potassium chloride    Tablet ER 10 milliEquivalent(s) Oral daily    MEDICATIONS  (PRN):  acetaminophen     Tablet .. 975 milliGRAM(s) Oral every 6 hours PRN Mild Pain (1 - 3)  albuterol    90 MICROgram(s) HFA Inhaler 2 Puff(s) Inhalation every 6 hours PRN Shortness of Breath and/or Wheezing  dextrose Oral Gel 15 Gram(s) Oral once PRN Blood Glucose LESS THAN 70 milliGRAM(s)/deciliter  mesalamine Suppository 1000 milliGRAM(s) Rectal at bedtime PRN ulcerative colitis  naloxone Injectable 0.1 milliGRAM(s) IV Push every 3 minutes PRN For ANY of the following changes in patient status:  A. RR LESS THAN 10 breaths per minute, B. Oxygen saturation LESS THAN 90%, C. Sedation score of 6  ondansetron    Tablet 8 milliGRAM(s) Oral every 8 hours PRN Nausea and/or Vomiting  ondansetron Injectable 4 milliGRAM(s) IV Push every 6 hours PRN Nausea  oxyCODONE    IR 5 milliGRAM(s) Oral every 3 hours PRN Moderate Pain (4 - 6)  oxyCODONE    IR 10 milliGRAM(s) Oral every 3 hours PRN Severe Pain (7 - 10)  simethicone 80 milliGRAM(s) Chew every 6 hours PRN Gas  sodium chloride 0.65% Nasal 1 Spray(s) Both Nostrils every 1 hour PRN Nasal Congestion      Physical Exam:  Constitutional: NAD  Pulmonary: clear to auscultation bilaterally, IS up to 1500  Cardiovascular: Regular rate and rhythm   Abdomen: soft, non-tender, mildly distended, normal bowel sounds  Incision: Clean, dry, intact with Prevena dressing in place.  Without signs of infection or hernia.  Extremities: no lower extremity calve tenderness, b/L  edema +1 in LE, Guillermo's sign negative.      LABS:                        9.1    16.55 )-----------( 619      ( 06 Dec 2022 05:06 )             29.7     12-06    135  |  97  |  10.8  ----------------------------<  117<H>  4.0   |  29.0  |  0.77    Ca    8.9      06 Dec 2022 05:06  Phos  3.6     12-06  Mg     1.9     12-06

## 2022-12-06 NOTE — DIETITIAN INITIAL EVALUATION ADULT - OTHER INFO
52F with PMH of HTN , DM Type 2, STEPHANIE , obesity, uterine  fibroids presents to ED with 1 week of abdominal pain accompanied by vomiting and blood in the urine. Pain started 1 week prior and was initially located in the RUQ/RLQ, but had worsened and was diffuse. Pain described as stabbing and episodic; episodes lasted approx. 1 hour and were partially relieved by Tylenol/ibuprofen. Pt also reported first-time episode of blood in the urine, did not think it is vaginal bleeding. She reported no dysuria but reported increased urge. Pt unsure of LMP, stated she had irregular vaginal bleeding since initial fibroid diagnosis in 2015, sees Dr. Tillman outpatient and was referred to [facility] for a hysterectomy.  Pt endorsed multiple episodes of nonblood, nonbilious emesis, and 1 episode on Sunday along with decreased PO intake.  Patient now s/p exp lap, JOANNE BSO, abdominal washout with 2Liters pus drained and wound vac placed

## 2022-12-06 NOTE — DIETITIAN INITIAL EVALUATION ADULT - PERTINENT MEDS FT
MEDICATIONS  (STANDING):  acetaminophen     Tablet .. 975 milliGRAM(s) Oral every 6 hours  amLODIPine   Tablet 10 milliGRAM(s) Oral daily  atorvastatin 10 milliGRAM(s) Oral at bedtime  dextrose 5%. 1000 milliLiter(s) (50 mL/Hr) IV Continuous <Continuous>  dextrose 5%. 1000 milliLiter(s) (100 mL/Hr) IV Continuous <Continuous>  dextrose 50% Injectable 25 Gram(s) IV Push once  dextrose 50% Injectable 12.5 Gram(s) IV Push once  dextrose 50% Injectable 25 Gram(s) IV Push once  enoxaparin Injectable 40 milliGRAM(s) SubCutaneous every 12 hours  fluticasone furoate/umeclidinium/vilanterol 100-62.5-25 MICROgram(s) Inhaler 1 Puff(s) Inhalation daily  fluticasone propionate 50 MICROgram(s)/spray Nasal Spray 1 Spray(s) Both Nostrils two times a day  glucagon  Injectable 1 milliGRAM(s) IntraMuscular once  insulin lispro (ADMELOG) corrective regimen sliding scale   SubCutaneous three times a day before meals  insulin lispro (ADMELOG) corrective regimen sliding scale   SubCutaneous at bedtime  lactated ringers Bolus 1000 milliLiter(s) IV Bolus once  loratadine 10 milliGRAM(s) Oral daily  metoprolol succinate ER 25 milliGRAM(s) Oral daily  montelukast 10 milliGRAM(s) Oral at bedtime  pantoprazole    Tablet 20 milliGRAM(s) Oral daily  piperacillin/tazobactam IVPB.. 3.375 Gram(s) IV Intermittent every 8 hours  polyethylene glycol 3350 17 Gram(s) Oral daily  potassium chloride    Tablet ER 10 milliEquivalent(s) Oral daily    MEDICATIONS  (PRN):  acetaminophen     Tablet .. 975 milliGRAM(s) Oral every 6 hours PRN Mild Pain (1 - 3)  albuterol    90 MICROgram(s) HFA Inhaler 2 Puff(s) Inhalation every 6 hours PRN Shortness of Breath and/or Wheezing  dextrose Oral Gel 15 Gram(s) Oral once PRN Blood Glucose LESS THAN 70 milliGRAM(s)/deciliter  mesalamine Suppository 1000 milliGRAM(s) Rectal at bedtime PRN ulcerative colitis  naloxone Injectable 0.1 milliGRAM(s) IV Push every 3 minutes PRN For ANY of the following changes in patient status:  A. RR LESS THAN 10 breaths per minute, B. Oxygen saturation LESS THAN 90%, C. Sedation score of 6  ondansetron    Tablet 8 milliGRAM(s) Oral every 8 hours PRN Nausea and/or Vomiting  ondansetron Injectable 4 milliGRAM(s) IV Push every 6 hours PRN Nausea  oxyCODONE    IR 5 milliGRAM(s) Oral every 3 hours PRN Moderate Pain (4 - 6)  oxyCODONE    IR 10 milliGRAM(s) Oral every 3 hours PRN Severe Pain (7 - 10)  simethicone 80 milliGRAM(s) Chew every 6 hours PRN Gas  sodium chloride 0.65% Nasal 1 Spray(s) Both Nostrils every 1 hour PRN Nasal Congestion

## 2022-12-06 NOTE — PROGRESS NOTE ADULT - ASSESSMENT
52y  Female with h/o uterine fibroids presented to ED 11/29 with 1 week of diffuse abdominal pain. Patient reports pain was intermittent for 1 week prior to presentation with flare ups on 11/17, 11/20 and then 11/29. Pain initially located in the RUQ/RLQ, and worsened and is now diffuse. Pain is described as stabbing and episodic; episodes last approx. 1 hour and are partially relieved by Tylenol/ibuprofen. Associated with episode of blood in the urine on 11/30. She reports no dysuria but reports increased urge. Patient with hx of irregular vaginal bleeding since 2015. States menstrual cycle may last 7-10 day and not consistently every month. This has been since initial fibroid diagnosis in 2015. Pt sees Dr. Tillman outpatient and was referred to [facility] for a hysterectomy in May 2022 for finding of 18 wk size large fibroid uterus, but was unable to followup due to insurance issues.  In the ED, patient was found to be febrile 100.6 and worked up for Sepsis due to tachycardia and hypotension. She received 1L fluid bolus x2. S/p Zosyn q8, Vancomycin x1, Zofran; Urine, blood cx collected. CT abd/pelvis obtained showing: Markedly abnormal and enlarged uterus with suspected necrosis of a 16.3 cm right fundal uterine mass, uterus 24 x18 x18 on imaging. The mass contains small foci of gas, raising concern for superimposed infection. Abnormal small to moderate amount of abdominal and pelvic ascites with anterior peritoneal soft tissue stranding. Continued on Zosyn.       Infection of Necrotic uterine mass with Streptococcus agalactiae  s/p exp lap, JOANNE BSO, abdominal washout with 2Liters pus drained and wound vac placed 11/30  Fever  Leukocytosis   STEPHANIE      - Blood cultures 11/29 no growth  - RVP/COVID 19 PCR 11/30 negative   - Surgical culture reporting  Streptococcus agalactiae  - CT abd/pelvis obtained showing: Markedly abnormal and enlarged uterus with suspected necrosis of a 16.3 cm right fundal uterine mass, uterus 24 x18 x18 on imaging. The mass contains small foci of gas, raising concern for superimposed infection. Abnormal small to moderate amount of abdominal and pelvic ascites with anterior peritoneal soft tissue stranding.  - UA negative for UTI   - Continue Zosyn  - s/p exp lap, JOANNE BSO, abdominal washout with 2Liters pus drained and wound vac placed 11/30  - Plan to complete course of antibiotics 12/7 (7 days after source control)   - Follow up cultures  - Trend Fever  - Trend WBC      Will Follow

## 2022-12-06 NOTE — PROGRESS NOTE ADULT - SUBJECTIVE AND OBJECTIVE BOX
[facility]  INFECTIOUS DISEASES at [place] and [place]  =======================================================                               Tien Moore MD#   Camille Bateman MD*                             Antonia Mixon MD*   Yola Redmond MD*            Diplomates American Board of Internal Medicine & Infectious Diseases                # [place] Office - Appt - Tel  [phone] Fax [phone]                * [place] Office - Appt - Tel [phone] Fax [phone]                                  Hospital Consult line:  [phone]  =======================================================    LORENA DUNCAN [id number]    Follow up: Pelvic infection    s/p exp lap, JOANNE BSO, abdominal washout with 2Liters pus drained and wound vac placed 11/30  No fever or chills. No complaints     Allergies:  codeine (Nausea)  IV Contrast (Other; Pruritus; Hives)       REVIEW OF SYSTEMS:  CONSTITUTIONAL:  No Fever or chills  HEENT:  No diplopia or blurred vision.  No earache, sore throat or runny nose.  CARDIOVASCULAR:  No chest pain  RESPIRATORY:  No cough, shortness of breath  GASTROINTESTINAL:  No nausea, vomiting or diarrhea. Improved Abd pain   GENITOURINARY:  No dysuria, frequency or urgency. No Blood in urine  MUSCULOSKELETAL:  no joint aches, no muscle pain  SKIN:  No change in skin, hair or nails.  NEUROLOGIC:  No Headaches, seizures  PSYCHIATRIC:  No disorder of thought or mood.  ENDOCRINE:  No heat or cold intolerance  HEMATOLOGICAL:  No easy bruising or bleeding.       Physical Exam:  GEN: NAD  HEENT: normocephalic and atraumatic. EOMI. PERRL.    NECK: Supple.   LUNGS: CTA B/L.  HEART: RRR  ABDOMEN: Soft, Obese.  +BS.  No tenderness, No rebound.   : No CVA tenderness  EXTREMITIES: Without  edema.  MSK: No joint swelling  NEUROLOGIC: No Focal Deficits   PSYCHIATRIC: Appropriate affect .  SKIN: No rash      Vitals:  T(F): 98.9 (06 Dec 2022 11:34), Max: 98.9 (06 Dec 2022 11:34)  HR: 90 (06 Dec 2022 11:34)  BP: 101/68 (06 Dec 2022 11:34)  RR: 18 (06 Dec 2022 11:34)  SpO2: 98% (06 Dec 2022 11:34) (91% - 98%)  temp max in last 48H T(F): , Max: 99.3 (12-05-22 @ 04:00)    Current Antibiotics:  piperacillin/tazobactam IVPB.. 3.375 Gram(s) IV Intermittent every 8 hours    Other medications:  acetaminophen     Tablet .. 975 milliGRAM(s) Oral every 6 hours  amLODIPine   Tablet 10 milliGRAM(s) Oral daily  atorvastatin 10 milliGRAM(s) Oral at bedtime  dextrose 5%. 1000 milliLiter(s) IV Continuous <Continuous>  dextrose 5%. 1000 milliLiter(s) IV Continuous <Continuous>  dextrose 50% Injectable 25 Gram(s) IV Push once  dextrose 50% Injectable 12.5 Gram(s) IV Push once  dextrose 50% Injectable 25 Gram(s) IV Push once  enoxaparin Injectable 40 milliGRAM(s) SubCutaneous every 12 hours  fluticasone furoate/umeclidinium/vilanterol 100-62.5-25 MICROgram(s) Inhaler 1 Puff(s) Inhalation daily  fluticasone propionate 50 MICROgram(s)/spray Nasal Spray 1 Spray(s) Both Nostrils two times a day  glucagon  Injectable 1 milliGRAM(s) IntraMuscular once  insulin lispro (ADMELOG) corrective regimen sliding scale   SubCutaneous three times a day before meals  insulin lispro (ADMELOG) corrective regimen sliding scale   SubCutaneous at bedtime  lactated ringers Bolus 1000 milliLiter(s) IV Bolus once  loratadine 10 milliGRAM(s) Oral daily  metoprolol succinate ER 25 milliGRAM(s) Oral daily  montelukast 10 milliGRAM(s) Oral at bedtime  pantoprazole    Tablet 20 milliGRAM(s) Oral daily  polyethylene glycol 3350 17 Gram(s) Oral daily  potassium chloride    Tablet ER 10 milliEquivalent(s) Oral daily                            9.1    16.55 )-----------( 619      ( 06 Dec 2022 05:06 )             29.7     12-06    135  |  97  |  10.8  ----------------------------<  117<H>  4.0   |  29.0  |  0.77    Ca    8.9      06 Dec 2022 05:06  Phos  3.6     12-06  Mg     1.9     12-06      RECENT CULTURES:  11-30 @ 19:00 .Surgical Swab Uterine Culture     Moderate Streptococcus agalactiae (Group B)  Streptococcus agalactiae (Group B) isolated  Group B streptococci are susceptible to ampicillin,  penicillin and cefazolin, but may be resistant to  erythromycin and clindamycin.  Recommendations for intrapartum prophylaxis for Group B  streptococci are penicillin or ampicillin.    11-30 @ 02:59    RVP  NotDetec    11-29 @ 19:02 Clean Catch Clean Catch (Midstream)     <10,000 CFU/mL Normal Urogenital Vivian    11-29 @ 12:45 .Blood Blood     No Growth Final    11-29 @ 12:35 .Blood Blood     No Growth Final      WBC Count: 16.55 K/uL (12-06-22 @ 05:06)  WBC Count: 17.38 K/uL (12-05-22 @ 06:36)  WBC Count: 16.43 K/uL (12-04-22 @ 05:15)  WBC Count: 17.26 K/uL (12-03-22 @ 11:48)  WBC Count: 18.78 K/uL (12-02-22 @ 06:09)    Creatinine, Serum: 0.77 mg/dL (12-06-22 @ 05:06)  Creatinine, Serum: 0.66 mg/dL (12-05-22 @ 06:36)  Creatinine, Serum: 0.71 mg/dL (12-04-22 @ 05:15)  Creatinine, Serum: 0.77 mg/dL (12-03-22 @ 11:48)  Creatinine, Serum: 0.83 mg/dL (12-02-22 @ 06:09)     COVID-19 PCR: NotDetec (12-05-22 @ 16:15)  SARS-CoV-2: NotDetec (11-30-22 @ 02:59)        < from: CT Angio Chest PE Protocol w/ IV Cont (12.02.22 @ 22:08) >  ACC: [id number] EXAM:  CT ANGIO CHEST PULM SUZI [place]A                          PROCEDURE DATE:  12/02/2022      INTERPRETATION:  CLINICAL INFORMATION: Hypoxia question pulmonary embolism    COMPARISON: CT abdomen and pelvis 11/29/2022.    CONTRAST/COMPLICATIONS:  IV Contrast: Omnipaque 350  53 cc administered   47 cc discarded  Oral Contrast: NONE  Complications: None reported at time of study completion    PROCEDURE:  CT Angiography of the Chest.  Sagittal and coronal reformats were performedas well as 3D (MIP)   reconstructions.    FINDINGS:    LUNGS AND AIRWAYS: Patent central airways.  Mild bibasilar degenerative   changes.  PLEURA: No pleural effusion.  MEDIASTINUM AND MATT: No lymphadenopathy.  VESSELS: No pulmonary embolism. Limited evaluation of the distal   segmental and subsegmental areas due to poor opacification.  HEART: Heart size is normal. No pericardial effusion.  CHEST WALL AND LOWER NECK: Mild upper abdominal wall subcutaneous edema.  VISUALIZED UPPER ABDOMEN: Trace perihepatic fluid  BONES: Degenerative changes.    IMPRESSION:  No pulmonary embolism. Limited evaluation of the distal segmental and   subsegmental areas due to poor opacification.    VERTEBRAL BODY ANALYSIS: No Vertebral fracture or low bone density   identified.    --- End of Report ---  < end of copied text >

## 2022-12-07 VITALS
RESPIRATION RATE: 18 BRPM | HEART RATE: 95 BPM | DIASTOLIC BLOOD PRESSURE: 70 MMHG | SYSTOLIC BLOOD PRESSURE: 123 MMHG | TEMPERATURE: 99 F | OXYGEN SATURATION: 93 %

## 2022-12-07 LAB
BASOPHILS # BLD AUTO: 0.06 K/UL — SIGNIFICANT CHANGE UP (ref 0–0.2)
BASOPHILS NFR BLD AUTO: 0.4 % — SIGNIFICANT CHANGE UP (ref 0–2)
EOSINOPHIL # BLD AUTO: 0.34 K/UL — SIGNIFICANT CHANGE UP (ref 0–0.5)
EOSINOPHIL NFR BLD AUTO: 2.2 % — SIGNIFICANT CHANGE UP (ref 0–6)
GLUCOSE BLDC GLUCOMTR-MCNC: 102 MG/DL — HIGH (ref 70–99)
GLUCOSE BLDC GLUCOMTR-MCNC: 115 MG/DL — HIGH (ref 70–99)
GLUCOSE BLDC GLUCOMTR-MCNC: 129 MG/DL — HIGH (ref 70–99)
HCT VFR BLD CALC: 29 % — LOW (ref 34.5–45)
HGB BLD-MCNC: 8.8 G/DL — LOW (ref 11.5–15.5)
IMM GRANULOCYTES NFR BLD AUTO: 7.7 % — HIGH (ref 0–0.9)
LYMPHOCYTES # BLD AUTO: 13.3 % — SIGNIFICANT CHANGE UP (ref 13–44)
LYMPHOCYTES # BLD AUTO: 2.1 K/UL — SIGNIFICANT CHANGE UP (ref 1–3.3)
MCHC RBC-ENTMCNC: 25.8 PG — LOW (ref 27–34)
MCHC RBC-ENTMCNC: 30.3 GM/DL — LOW (ref 32–36)
MCV RBC AUTO: 85 FL — SIGNIFICANT CHANGE UP (ref 80–100)
MONOCYTES # BLD AUTO: 0.94 K/UL — HIGH (ref 0–0.9)
MONOCYTES NFR BLD AUTO: 6 % — SIGNIFICANT CHANGE UP (ref 2–14)
NEUTROPHILS # BLD AUTO: 11.11 K/UL — HIGH (ref 1.8–7.4)
NEUTROPHILS NFR BLD AUTO: 70.4 % — SIGNIFICANT CHANGE UP (ref 43–77)
PLATELET # BLD AUTO: 637 K/UL — HIGH (ref 150–400)
RBC # BLD: 3.41 M/UL — LOW (ref 3.8–5.2)
RBC # FLD: 16.7 % — HIGH (ref 10.3–14.5)
WBC # BLD: 15.77 K/UL — HIGH (ref 3.8–10.5)
WBC # FLD AUTO: 15.77 K/UL — HIGH (ref 3.8–10.5)

## 2022-12-07 PROCEDURE — 93005 ELECTROCARDIOGRAM TRACING: CPT

## 2022-12-07 PROCEDURE — 96375 TX/PRO/DX INJ NEW DRUG ADDON: CPT

## 2022-12-07 PROCEDURE — 0225U NFCT DS DNA&RNA 21 SARSCOV2: CPT

## 2022-12-07 PROCEDURE — 85014 HEMATOCRIT: CPT

## 2022-12-07 PROCEDURE — 87077 CULTURE AEROBIC IDENTIFY: CPT

## 2022-12-07 PROCEDURE — 86901 BLOOD TYPING SEROLOGIC RH(D): CPT

## 2022-12-07 PROCEDURE — U0003: CPT

## 2022-12-07 PROCEDURE — 83735 ASSAY OF MAGNESIUM: CPT

## 2022-12-07 PROCEDURE — 85730 THROMBOPLASTIN TIME PARTIAL: CPT

## 2022-12-07 PROCEDURE — 99285 EMERGENCY DEPT VISIT HI MDM: CPT

## 2022-12-07 PROCEDURE — 36415 COLL VENOUS BLD VENIPUNCTURE: CPT

## 2022-12-07 PROCEDURE — 85384 FIBRINOGEN ACTIVITY: CPT

## 2022-12-07 PROCEDURE — 84100 ASSAY OF PHOSPHORUS: CPT

## 2022-12-07 PROCEDURE — 87070 CULTURE OTHR SPECIMN AEROBIC: CPT

## 2022-12-07 PROCEDURE — 71275 CT ANGIOGRAPHY CHEST: CPT

## 2022-12-07 PROCEDURE — 87040 BLOOD CULTURE FOR BACTERIA: CPT

## 2022-12-07 PROCEDURE — 88342 IMHCHEM/IMCYTCHM 1ST ANTB: CPT

## 2022-12-07 PROCEDURE — 87086 URINE CULTURE/COLONY COUNT: CPT

## 2022-12-07 PROCEDURE — 85610 PROTHROMBIN TIME: CPT

## 2022-12-07 PROCEDURE — 80202 ASSAY OF VANCOMYCIN: CPT

## 2022-12-07 PROCEDURE — 88311 DECALCIFY TISSUE: CPT

## 2022-12-07 PROCEDURE — 96376 TX/PRO/DX INJ SAME DRUG ADON: CPT

## 2022-12-07 PROCEDURE — 84295 ASSAY OF SERUM SODIUM: CPT

## 2022-12-07 PROCEDURE — 84702 CHORIONIC GONADOTROPIN TEST: CPT

## 2022-12-07 PROCEDURE — 81001 URINALYSIS AUTO W/SCOPE: CPT

## 2022-12-07 PROCEDURE — 99232 SBSQ HOSP IP/OBS MODERATE 35: CPT

## 2022-12-07 PROCEDURE — 80053 COMPREHEN METABOLIC PANEL: CPT

## 2022-12-07 PROCEDURE — 86850 RBC ANTIBODY SCREEN: CPT

## 2022-12-07 PROCEDURE — 82947 ASSAY GLUCOSE BLOOD QUANT: CPT

## 2022-12-07 PROCEDURE — 85027 COMPLETE CBC AUTOMATED: CPT

## 2022-12-07 PROCEDURE — 83036 HEMOGLOBIN GLYCOSYLATED A1C: CPT

## 2022-12-07 PROCEDURE — 80048 BASIC METABOLIC PNL TOTAL CA: CPT

## 2022-12-07 PROCEDURE — 86900 BLOOD TYPING SEROLOGIC ABO: CPT

## 2022-12-07 PROCEDURE — 88307 TISSUE EXAM BY PATHOLOGIST: CPT

## 2022-12-07 PROCEDURE — 82330 ASSAY OF CALCIUM: CPT

## 2022-12-07 PROCEDURE — 74177 CT ABD & PELVIS W/CONTRAST: CPT | Mod: ME

## 2022-12-07 PROCEDURE — 83605 ASSAY OF LACTIC ACID: CPT

## 2022-12-07 PROCEDURE — 82962 GLUCOSE BLOOD TEST: CPT

## 2022-12-07 PROCEDURE — 87075 CULTR BACTERIA EXCEPT BLOOD: CPT

## 2022-12-07 PROCEDURE — 84132 ASSAY OF SERUM POTASSIUM: CPT

## 2022-12-07 PROCEDURE — 83690 ASSAY OF LIPASE: CPT

## 2022-12-07 PROCEDURE — 85018 HEMOGLOBIN: CPT

## 2022-12-07 PROCEDURE — 82435 ASSAY OF BLOOD CHLORIDE: CPT

## 2022-12-07 PROCEDURE — 82803 BLOOD GASES ANY COMBINATION: CPT

## 2022-12-07 PROCEDURE — 88305 TISSUE EXAM BY PATHOLOGIST: CPT

## 2022-12-07 PROCEDURE — U0005: CPT

## 2022-12-07 PROCEDURE — 85025 COMPLETE CBC W/AUTO DIFF WBC: CPT

## 2022-12-07 PROCEDURE — 96374 THER/PROPH/DIAG INJ IV PUSH: CPT

## 2022-12-07 PROCEDURE — 94640 AIRWAY INHALATION TREATMENT: CPT

## 2022-12-07 PROCEDURE — G1004: CPT

## 2022-12-07 RX ADMIN — Medication 10 MILLIEQUIVALENT(S): at 11:05

## 2022-12-07 RX ADMIN — Medication 1 SPRAY(S): at 06:21

## 2022-12-07 RX ADMIN — PIPERACILLIN AND TAZOBACTAM 25 GRAM(S): 4; .5 INJECTION, POWDER, LYOPHILIZED, FOR SOLUTION INTRAVENOUS at 06:21

## 2022-12-07 RX ADMIN — PANTOPRAZOLE SODIUM 20 MILLIGRAM(S): 20 TABLET, DELAYED RELEASE ORAL at 13:53

## 2022-12-07 RX ADMIN — Medication 975 MILLIGRAM(S): at 06:22

## 2022-12-07 RX ADMIN — Medication 975 MILLIGRAM(S): at 07:00

## 2022-12-07 RX ADMIN — POLYETHYLENE GLYCOL 3350 17 GRAM(S): 17 POWDER, FOR SOLUTION ORAL at 11:06

## 2022-12-07 RX ADMIN — AMLODIPINE BESYLATE 10 MILLIGRAM(S): 2.5 TABLET ORAL at 06:22

## 2022-12-07 RX ADMIN — OXYCODONE HYDROCHLORIDE 10 MILLIGRAM(S): 5 TABLET ORAL at 00:00

## 2022-12-07 RX ADMIN — ENOXAPARIN SODIUM 40 MILLIGRAM(S): 100 INJECTION SUBCUTANEOUS at 11:44

## 2022-12-07 RX ADMIN — Medication 25 MILLIGRAM(S): at 06:22

## 2022-12-07 RX ADMIN — LORATADINE 10 MILLIGRAM(S): 10 TABLET ORAL at 11:05

## 2022-12-07 RX ADMIN — Medication 1 SPRAY(S): at 17:16

## 2022-12-07 RX ADMIN — Medication 1 SPRAY(S): at 06:23

## 2022-12-07 NOTE — PROGRESS NOTE ADULT - NUTRITIONAL ASSESSMENT
This patient has been assessed with a concern for Malnutrition and has been determined to have a diagnosis/diagnoses of Mild protein-calorie malnutrition.    This patient is being managed with:   Diet Regular-  Consistent Carbohydrate {Evening Snack} (CSTCHOSN)  DASH/TLC {Sodium & Cholesterol Restricted} (DASH)  Entered: Nov 30 2022  8:43PM

## 2022-12-07 NOTE — PROGRESS NOTE ADULT - SUBJECTIVE AND OBJECTIVE BOX
GYNECOLOGIC ONCOLOGY PROGRESS NOTE    POD# 7  HD: 9    PROBLEMS:      T(F): 98.4 (12-07-22 @ 08:12), Max: 100 (12-06-22 @ 20:00)  HR: 98 (12-07-22 @ 08:12) (87 - 100)  BP: 111/71 (12-07-22 @ 08:12) (101/68 - 137/75)  RR: 20 (12-07-22 @ 08:12) (18 - 20)  SpO2: 94% (12-07-22 @ 08:12) (91% - 98%)  Wt(kg): -- is seen and examined at bedside.     SUBJECTIVE:    Patient denies fevers, chills.   Patient states pain well controlled and cramping improved from yesterday.  Flatus: Yes  Denies Nausea, Vomiting or Diarrhea.  Denies shortness of breath, chest pain or dyspnea on exertion.  Tolerating diet.    OBJECTIVE:     VITALS:  T(F): 98.4 (12-07-22 @ 08:12), Max: 100 (12-06-22 @ 20:00)  HR: 98 (12-07-22 @ 08:12) (87 - 100)  BP: 111/71 (12-07-22 @ 08:12) (101/68 - 137/75)  RR: 20 (12-07-22 @ 08:12) (18 - 20)  SpO2: 94% (12-07-22 @ 08:12) (91% - 98%)  Wt(kg): --    I&O's Summary    06 Dec 2022 07:01  -  07 Dec 2022 07:00  --------------------------------------------------------  IN: 840 mL / OUT: 0 mL / NET: 840 mL        MEDICATIONS  (STANDING):  acetaminophen     Tablet .. 975 milliGRAM(s) Oral every 6 hours  amLODIPine   Tablet 10 milliGRAM(s) Oral daily  atorvastatin 10 milliGRAM(s) Oral at bedtime  dextrose 5%. 1000 milliLiter(s) (50 mL/Hr) IV Continuous <Continuous>  dextrose 5%. 1000 milliLiter(s) (100 mL/Hr) IV Continuous <Continuous>  dextrose 50% Injectable 25 Gram(s) IV Push once  dextrose 50% Injectable 12.5 Gram(s) IV Push once  dextrose 50% Injectable 25 Gram(s) IV Push once  enoxaparin Injectable 40 milliGRAM(s) SubCutaneous every 12 hours  fluticasone furoate/umeclidinium/vilanterol 100-62.5-25 MICROgram(s) Inhaler 1 Puff(s) Inhalation daily  fluticasone propionate 50 MICROgram(s)/spray Nasal Spray 1 Spray(s) Both Nostrils two times a day  glucagon  Injectable 1 milliGRAM(s) IntraMuscular once  insulin lispro (ADMELOG) corrective regimen sliding scale   SubCutaneous three times a day before meals  insulin lispro (ADMELOG) corrective regimen sliding scale   SubCutaneous at bedtime  lactated ringers Bolus 1000 milliLiter(s) IV Bolus once  loratadine 10 milliGRAM(s) Oral daily  metoprolol succinate ER 25 milliGRAM(s) Oral daily  montelukast 10 milliGRAM(s) Oral at bedtime  pantoprazole    Tablet 20 milliGRAM(s) Oral daily  piperacillin/tazobactam IVPB.. 3.375 Gram(s) IV Intermittent every 8 hours  polyethylene glycol 3350 17 Gram(s) Oral daily  potassium chloride    Tablet ER 10 milliEquivalent(s) Oral daily    MEDICATIONS  (PRN):  acetaminophen     Tablet .. 975 milliGRAM(s) Oral every 6 hours PRN Mild Pain (1 - 3)  albuterol    90 MICROgram(s) HFA Inhaler 2 Puff(s) Inhalation every 6 hours PRN Shortness of Breath and/or Wheezing  dextrose Oral Gel 15 Gram(s) Oral once PRN Blood Glucose LESS THAN 70 milliGRAM(s)/deciliter  mesalamine Suppository 1000 milliGRAM(s) Rectal at bedtime PRN ulcerative colitis  naloxone Injectable 0.1 milliGRAM(s) IV Push every 3 minutes PRN For ANY of the following changes in patient status:  A. RR LESS THAN 10 breaths per minute, B. Oxygen saturation LESS THAN 90%, C. Sedation score of 6  ondansetron    Tablet 8 milliGRAM(s) Oral every 8 hours PRN Nausea and/or Vomiting  ondansetron Injectable 4 milliGRAM(s) IV Push every 6 hours PRN Nausea  oxyCODONE    IR 5 milliGRAM(s) Oral every 3 hours PRN Moderate Pain (4 - 6)  oxyCODONE    IR 10 milliGRAM(s) Oral every 3 hours PRN Severe Pain (7 - 10)  simethicone 80 milliGRAM(s) Chew every 6 hours PRN Gas  sodium chloride 0.65% Nasal 1 Spray(s) Both Nostrils every 1 hour PRN Nasal Congestion      Physical Exam:  Constitutional: NAD  Pulmonary: clear to auscultation bilaterally  Cardiovascular: Regular rate and rhythm   Abdomen: soft, non-tender, mildly distended, normal bowel sounds  Incision: Clean, dry, intact. Prevena dressing removed, Staples in place.  Without signs of infection or hernia.  Extremities: no lower extremity calve tenderness, b/L  edema +1 in LE, Guillermo's sign negative.      LABS:                        9.1    16.55 )-----------( 619      ( 06 Dec 2022 05:06 )             29.7     12-06    135  |  97  |  10.8  ----------------------------<  117<H>  4.0   |  29.0  |  0.77    Ca    8.9      06 Dec 2022 05:06  Phos  3.6     12-06  Mg     1.9     12-06

## 2022-12-07 NOTE — PROGRESS NOTE ADULT - ATTENDING COMMENTS
In summary, pt POD#5 s/p ex-lap JOANNE, BSO, abdominal wash-out for pelvic mass and removal of 2L purulent ascites. She reports some nausea/ upset stomach today. She is waiting for her sister to bring her some whole-fat milk to calm her stomach. She states she has coughed up some phlegm from her post nasal drip. She denies chest pain or shortness of breath. Vital signs and labs reviewed- remains afebrile overnight with improvement in SpO2 >92%. WBC remains elevate but without left shift today. Anticipate drop tomorrow. Medicine and Infectious Disease following- recommend keeping admitted until 12/7 on IV Zosyn. Blood cultures and urine cultures no growth . Encourage continued use of incentive spirometer and ambulation. RN asked to give zofran IV for upset stomach as pt refusing other PO medications due to nausea.     Ayesha Whaley PGY5
In summary, pt POD#6 s/p JOANNE, BSO for ruptured necrotic uterine mass growing GBS. Blood and urine cultures negative. She has been ambulating, voiding and tolerating a regular diet. ID following- recommending completing 7d course of zosyn which will end tomorrow. Provena removed, midline vertical incision c/d/i with staples and no evidence of wound infection. Will get discharge in order for 12/7/22    Ayesha Whaley PGY5
In summary, pt POD#7 s/p JOANNE, BSO for necrotic uterus GBS+. She has completed 7d course of IV antibiotics per ID recommendations. Repeat CBC showing stable WBC. Spoke with ID who did not recommend further antibiotics as outpatient. Vital signs reviewed, remains afebrile. On exam abdomen is obese, non tender. Incision site c/d/i with staples. Spoke with patient and sister regarding plan for discharge today, cousin is coming to pick her up this evening and stay with her to help her recover. The patient was instructed to follow up in 1-2w in clinic to remove staples and assess how recovery is going. She should return to ED for fevers, chills, worsening pain, bleeding or dizziness. Meds to pharmacy.     Ayesha Whaley PGY5
In summary, pt POD#2 s/p ex-lap JOANNE, BSO, abdominal wash-out for pelvic mass and removal of 2 L purulent ascites. She reports feeling well and d nies fevers or chills. Reports adequate pain control. She is continued on IV zosyn with addition of IV vancomycin per ID recommendations. Preliminary result showing GBS on uterine mass culture- no growth in urine or blood. She has been ambulating, voiding and tolerating a regular diet. She is passing gas. She has required 2-3L of O2 overnight due to desaturations to 90%. While asthma or atelectasis may be contributing to this, she has risk factors for cardiac dysfunction and had been in a hypercoagulable state on arrival. EKG ordered in addition to CT PE to evaluate causes of hypoxia. Due to contrast allergy will need steroids and benadryl prior to contrast administration. Continue Lovenox and encourage incentive spirometry in addition to asthma medications. Incision c/d/i with Provena. Abdomen soft and non tender.     Ayesha Whaley PGY5

## 2022-12-07 NOTE — PROGRESS NOTE ADULT - ASSESSMENT
A/P: 53yo G0 LMP 11/15/22 with PMH of uterine fibroids and AUB admitted for sepsis due to fever, increased RR, and increased HR now with worsening leukocytosis. Now, POD#7 s/p ex-lap JOANNE, BSO, abdominal wash-out for pelvic mass and removal of 2 L ascites fluid.     A/P:   Cardiac: Hx of HTN. BP medications ordered with strict hold parameters. BP WNL   Pulmonary: Hx of asthma. Home medications ordered. Incentive spirometer at bedside. CT angio no PE on 12/3.  Patient with probable diagnosis of STEPHANIE, to f/u outpatient. PCP appointment made.    Neuro: Pain well controlled with current regimen.   Endo: Hx of DM. Patient last required 2U insulin on 12/4. POCT glucose < 200, goal for optimal wound healing. Patient to f/u outpatient with PCP for further management.   GI: Tolerating regular diet. Continue protonix.   : Voiding spontaneously. Adequate UOP. Preop creatinine 1.95 -> 1.23 >> 0.71 -> 0.77  ID: Patient on Zosyn IV due to Sepsis 2/2 infected Pelvic mass. Preop WBC 18 -> postop 16 -> 18.78 >>16.43 > 16.55. As per ID, continue Zosyn until today, 12/7. Urine culture negative. Blood culture negative, final. Surgical culture positive for GBS. Will discuss with ID for further management recommendations  Heme: Preop Hgb 9.4-> 8.0 -> 8.1 >> 9.1 ->9.6 -> 9.1 SCDs when not ambulating. Lovenox for VTE prophylaxis.   Skin: No active disease. Dressing/wound with no signs of infection.   Psych: no active problems   FEN: Tolerating PO, no IVF. Electrolytes repleted prn.   MSK: Encouraged ambulation. S/p PT assessment, recommended safe for home discharge.   Dispo: Anticipate discharge today, pending discussion with ID team.   Code Status: FULL

## 2022-12-07 NOTE — PROGRESS NOTE ADULT - PROVIDER SPECIALTY LIST ADULT
Gyn Onc
Gyn Onc
Hospitalist
Infectious Disease
Infectious Disease
GYN
Hospitalist
Hospitalist
Infectious Disease
Gyn Onc
Hospitalist
Infectious Disease
Infectious Disease
Hospitalist
Gyn Onc

## 2022-12-07 NOTE — PROGRESS NOTE ADULT - SUBJECTIVE AND OBJECTIVE BOX
[facility]  INFECTIOUS DISEASES at [place] and [place]  =======================================================                               Tien Moore MD#   Camille Bateman MD*                             Antonia Mixon MD*   Yola Redmond MD*            Diplomates American Board of Internal Medicine & Infectious Diseases                # [place] Office - Appt - Tel  [phone] Fax [phone]                * [place] Office - Appt - Tel [phone] Fax [phone]                                  Hospital Consult line:  [phone]  =======================================================    LORENA DUNCAN [id number]    Follow up: Pelvic infection    s/p exp lap, JOANNE BSO, abdominal washout with 2Liters pus drained and wound vac placed 11/30  No fever or chills. No complaints     Allergies:  codeine (Nausea)  IV Contrast (Other; Pruritus; Hives)       REVIEW OF SYSTEMS:  CONSTITUTIONAL:  No Fever or chills  HEENT:  No diplopia or blurred vision.  No earache, sore throat or runny nose.  CARDIOVASCULAR:  No chest pain  RESPIRATORY:  No cough, shortness of breath  GASTROINTESTINAL:  No nausea, vomiting or diarrhea. Improved Abd pain   GENITOURINARY:  No dysuria, frequency or urgency. No Blood in urine  MUSCULOSKELETAL:  no joint aches, no muscle pain  SKIN:  No change in skin, hair or nails.  NEUROLOGIC:  No Headaches, seizures  PSYCHIATRIC:  No disorder of thought or mood.  ENDOCRINE:  No heat or cold intolerance  HEMATOLOGICAL:  No easy bruising or bleeding.       Physical Exam:  GEN: NAD  HEENT: normocephalic and atraumatic. EOMI. PERRL.    NECK: Supple.   LUNGS: CTA B/L.  HEART: RRR  ABDOMEN: Soft, Obese.  +BS.  No tenderness, No rebound.   : No CVA tenderness  EXTREMITIES: Without  edema.  MSK: No joint swelling  NEUROLOGIC: No Focal Deficits   PSYCHIATRIC: Appropriate affect .  SKIN: No rash      Vitals:  T(F): 98.4 (07 Dec 2022 08:12), Max: 100 (06 Dec 2022 20:00)  HR: 98 (07 Dec 2022 08:12)  BP: 111/71 (07 Dec 2022 08:12)  RR: 20 (07 Dec 2022 08:12)  SpO2: 94% (07 Dec 2022 08:12) (91% - 98%)  temp max in last 48H T(F): , Max: 100 (12-06-22 @ 20:00)      Current Antibiotics:  piperacillin/tazobactam IVPB.. 3.375 Gram(s) IV Intermittent every 8 hours    Other medications:  acetaminophen     Tablet .. 975 milliGRAM(s) Oral every 6 hours  amLODIPine   Tablet 10 milliGRAM(s) Oral daily  atorvastatin 10 milliGRAM(s) Oral at bedtime  dextrose 5%. 1000 milliLiter(s) IV Continuous <Continuous>  dextrose 5%. 1000 milliLiter(s) IV Continuous <Continuous>  dextrose 50% Injectable 25 Gram(s) IV Push once  dextrose 50% Injectable 12.5 Gram(s) IV Push once  dextrose 50% Injectable 25 Gram(s) IV Push once  enoxaparin Injectable 40 milliGRAM(s) SubCutaneous every 12 hours  fluticasone furoate/umeclidinium/vilanterol 100-62.5-25 MICROgram(s) Inhaler 1 Puff(s) Inhalation daily  fluticasone propionate 50 MICROgram(s)/spray Nasal Spray 1 Spray(s) Both Nostrils two times a day  glucagon  Injectable 1 milliGRAM(s) IntraMuscular once  insulin lispro (ADMELOG) corrective regimen sliding scale   SubCutaneous three times a day before meals  insulin lispro (ADMELOG) corrective regimen sliding scale   SubCutaneous at bedtime  lactated ringers Bolus 1000 milliLiter(s) IV Bolus once  loratadine 10 milliGRAM(s) Oral daily  metoprolol succinate ER 25 milliGRAM(s) Oral daily  montelukast 10 milliGRAM(s) Oral at bedtime  pantoprazole    Tablet 20 milliGRAM(s) Oral daily  polyethylene glycol 3350 17 Gram(s) Oral daily  potassium chloride    Tablet ER 10 milliEquivalent(s) Oral daily                            9.1    16.55 )-----------( 619      ( 06 Dec 2022 05:06 )             29.7     12-06    135  |  97  |  10.8  ----------------------------<  117<H>  4.0   |  29.0  |  0.77    Ca    8.9      06 Dec 2022 05:06  Phos  3.6     12-06  Mg     1.9     12-06      RECENT CULTURES:  11-30 @ 19:00 .Surgical Swab Uterine Culture     Moderate Streptococcus agalactiae (Group B)  Streptococcus agalactiae (Group B) isolated  Group B streptococci are susceptible to ampicillin,  penicillin and cefazolin, but may be resistant to  erythromycin and clindamycin.  Recommendations for intrapartum prophylaxis for Group B  streptococci are penicillin or ampicillin.    11-30 @ 02:59    RVP  NotDete    11-29 @ 19:02 Clean Catch Clean Catch (Midstream)     <10,000 CFU/mL Normal Urogenital Vivian    11-29 @ 12:45 .Blood Blood     No Growth Final    11-29 @ 12:35 .Blood Blood     No Growth Final      WBC Count: 16.55 K/uL (12-06-22 @ 05:06)  WBC Count: 17.38 K/uL (12-05-22 @ 06:36)  WBC Count: 16.43 K/uL (12-04-22 @ 05:15)  WBC Count: 17.26 K/uL (12-03-22 @ 11:48)    Creatinine, Serum: 0.77 mg/dL (12-06-22 @ 05:06)  Creatinine, Serum: 0.66 mg/dL (12-05-22 @ 06:36)  Creatinine, Serum: 0.71 mg/dL (12-04-22 @ 05:15)  Creatinine, Serum: 0.77 mg/dL (12-03-22 @ 11:48)           COVID-19 PCR: NotDetec (12-05-22 @ 16:15)  SARS-CoV-2: NotDetec (11-30-22 @ 02:59)        < from: CT Angio Chest PE Protocol w/ IV Cont (12.02.22 @ 22:08) >  ACC: [id number] EXAM:  CT ANGIO CHEST PULM [facility]                          PROCEDURE DATE:  12/02/2022      INTERPRETATION:  CLINICAL INFORMATION: Hypoxia question pulmonary embolism    COMPARISON: CT abdomen and pelvis 11/29/2022.    CONTRAST/COMPLICATIONS:  IV Contrast: Omnipaque 350  53 cc administered   47 cc discarded  Oral Contrast: NONE  Complications: None reported at time of study completion    PROCEDURE:  CT Angiography of the Chest.  Sagittal and coronal reformats were performedas well as 3D (MIP)   reconstructions.    FINDINGS:    LUNGS AND AIRWAYS: Patent central airways.  Mild bibasilar degenerative   changes.  PLEURA: No pleural effusion.  MEDIASTINUM AND MATT: No lymphadenopathy.  VESSELS: No pulmonary embolism. Limited evaluation of the distal   segmental and subsegmental areas due to poor opacification.  HEART: Heart size is normal. No pericardial effusion.  CHEST WALL AND LOWER NECK: Mild upper abdominal wall subcutaneous edema.  VISUALIZED UPPER ABDOMEN: Trace perihepatic fluid  BONES: Degenerative changes.    IMPRESSION:  No pulmonary embolism. Limited evaluation of the distal segmental and   subsegmental areas due to poor opacification.    VERTEBRAL BODY ANALYSIS: No Vertebral fracture or low bone density   identified.    --- End of Report ---  < end of copied text >

## 2022-12-07 NOTE — PROGRESS NOTE ADULT - ASSESSMENT
52y  Female with h/o uterine fibroids presented to ED 11/29 with 1 week of diffuse abdominal pain. Patient reports pain was intermittent for 1 week prior to presentation with flare ups on 11/17, 11/20 and then 11/29. Pain initially located in the RUQ/RLQ, and worsened and is now diffuse. Pain is described as stabbing and episodic; episodes last approx. 1 hour and are partially relieved by Tylenol/ibuprofen. Associated with episode of blood in the urine on 11/30. She reports no dysuria but reports increased urge. Patient with hx of irregular vaginal bleeding since 2015. States menstrual cycle may last 7-10 day and not consistently every month. This has been since initial fibroid diagnosis in 2015. Pt sees Dr. Tillman outpatient and was referred to GYN ONC for a hysterectomy in May 2022 for finding of 18 wk size large fibroid uterus, but was unable to followup due to insurance issues.  In the ED, patient was found to be febrile 100.6 and worked up for Sepsis due to tachycardia and hypotension. She received 1L fluid bolus x2. S/p Zosyn q8, Vancomycin x1, Zofran; Urine, blood cx collected. CT abd/pelvis obtained showing: Markedly abnormal and enlarged uterus with suspected necrosis of a 16.3 cm right fundal uterine mass, uterus 24 x18 x18 on imaging. The mass contains small foci of gas, raising concern for superimposed infection. Abnormal small to moderate amount of abdominal and pelvic ascites with anterior peritoneal soft tissue stranding. Continued on Zosyn.       Infection of Necrotic uterine mass with Streptococcus agalactiae  s/p exp lap, JOANNE BSO, abdominal washout with 2Liters pus drained and wound vac placed 11/30  Fever  Leukocytosis   STEPHANIE      - Blood cultures 11/29 no growth  - RVP/COVID 19 PCR 11/30 negative   - Surgical culture reporting  Streptococcus agalactiae  - CT abd/pelvis obtained showing: Markedly abnormal and enlarged uterus with suspected necrosis of a 16.3 cm right fundal uterine mass, uterus 24 x18 x18 on imaging. The mass contains small foci of gas, raising concern for superimposed infection. Abnormal small to moderate amount of abdominal and pelvic ascites with anterior peritoneal soft tissue stranding.  - UA negative for UTI   - Continue Zosyn  - s/p exp lap, JOANNE BSO, abdominal washout with 2Liters pus drained and wound vac placed 11/30  - Plan to complete course of antibiotics 12/7 (7 days after source control)   - Follow up cultures  - Trend Fever  - Trend WBC      Will Follow    d/w GYN/Onc team

## 2022-12-07 NOTE — PROGRESS NOTE ADULT - REASON FOR ADMISSION
Diffuse abdominal pain
Diffuse abdominal pain  Post op surgery
Diffuse abdominal pain
Diffuse abdominal pain, Sepsis, s/p Ex Lap surgery
Diffuse abdominal pain  Postop abdominal surgery

## 2022-12-09 ENCOUNTER — NON-APPOINTMENT (OUTPATIENT)
Age: 52
End: 2022-12-09

## 2022-12-09 RX ORDER — NAPROXEN 500 MG/1
500 TABLET ORAL
Qty: 6 | Refills: 0 | Status: COMPLETED | COMMUNITY
Start: 2022-12-09 | End: 2022-12-12

## 2022-12-09 RX ORDER — OXYCODONE 5 MG/1
5 TABLET ORAL
Qty: 9 | Refills: 0 | Status: ACTIVE | COMMUNITY
Start: 2022-12-09 | End: 1900-01-01

## 2022-12-09 RX ORDER — APIXABAN 2.5 MG/1
2.5 TABLET, FILM COATED ORAL
Qty: 56 | Refills: 0 | Status: ACTIVE | COMMUNITY
Start: 2022-12-09 | End: 1900-01-01

## 2022-12-12 ENCOUNTER — APPOINTMENT (OUTPATIENT)
Dept: GYNECOLOGIC ONCOLOGY | Facility: CLINIC | Age: 52
End: 2022-12-12

## 2022-12-12 VITALS
HEART RATE: 106 BPM | HEIGHT: 63 IN | BODY MASS INDEX: 49.26 KG/M2 | OXYGEN SATURATION: 97 % | DIASTOLIC BLOOD PRESSURE: 83 MMHG | WEIGHT: 278 LBS | SYSTOLIC BLOOD PRESSURE: 144 MMHG

## 2022-12-12 DIAGNOSIS — Z09 ENCOUNTER FOR FOLLOW-UP EXAMINATION AFTER COMPLETED TREATMENT FOR CONDITIONS OTHER THAN MALIGNANT NEOPLASM: ICD-10-CM

## 2022-12-12 DIAGNOSIS — D25.9 LEIOMYOMA OF UTERUS, UNSPECIFIED: ICD-10-CM

## 2022-12-12 PROCEDURE — 99024 POSTOP FOLLOW-UP VISIT: CPT

## 2022-12-12 RX ORDER — ATORVASTATIN CALCIUM 10 MG/1
10 TABLET, FILM COATED ORAL
Qty: 30 | Refills: 0 | Status: ACTIVE | COMMUNITY
Start: 2022-04-28

## 2022-12-12 RX ORDER — ENOXAPARIN SODIUM 40 MG/.4ML
40 INJECTION, SOLUTION SUBCUTANEOUS
Qty: 56 | Refills: 0 | Status: DISCONTINUED | COMMUNITY
Start: 2022-12-08 | End: 2022-12-12

## 2022-12-12 RX ORDER — AMLODIPINE BESYLATE 10 MG/1
10 TABLET ORAL
Qty: 30 | Refills: 0 | Status: ACTIVE | COMMUNITY
Start: 2022-04-28

## 2022-12-15 LAB — SURGICAL PATHOLOGY STUDY: SIGNIFICANT CHANGE UP

## 2022-12-19 NOTE — ASSESSMENT
[FreeTextEntry1] : 51 yo female s/p exploratory laparotomy, JOANNE BSO KELSIE resection of anterior abdominal wall masses, pelvic puss evacuation and irrigation abdominal cavity washout 11/30/22. Recovering well post operatively. Staples removed without complication.

## 2022-12-19 NOTE — CHIEF COMPLAINT
[FreeTextEntry1] : [facility]\par [place] [facility]\par [address]\[place] [address]\par

## 2022-12-19 NOTE — HISTORY OF PRESENT ILLNESS
[FreeTextEntry1] : 51 yo nulligravid female LMP 11/15/22 s/p exploratory laparotomy, JOANNE BSO KELSIE resection of anterior abdominal wall masses, pelvic puss evacuation and irrigation abdominal cavity washout 11/30/22 by Dr Navarro. Patient originally presented to [facility] ED with c/o abdominal pain found to have degenerating 16.3cm right fundal fibroid with foci of gas and pelvic/abdominal ascites on CT with concern for sepsis. She underwent above surgery and was discharged POD 7 after completing course of IV zosyn x 7 days for GBS cultures. She reports today for her first POA and staple removal. Pathology pending. \par \par Patient reports feeling well after surgery, she has been having small bowel movements. Patient reports feeling well, her pain is controlled without use of medications. She is ambulating slightly, voiding and having regular BM although they are small. She is tolerating PO without N/V. She reports She denies CP/SOB, Fevers/Chills, VB. \par \par \par Lpap: 5/23/22 NIL HPV negative\par Lmammo:  6/28/22 B1\par Lcolonoscopy: few years, had apt for follow up colonoscopy this week but will be rescheduling.\par

## 2022-12-19 NOTE — PHYSICAL EXAM
[Chaperone Present] : A chaperone was present in the examining room during all aspects of the physical examination [Normal] : Mood and affect: Normal [FreeTextEntry1] : Bernice Worthy [de-identified] : Midline incision healing well without signs of infection [de-identified] : GYN exam deferred.

## 2022-12-19 NOTE — END OF VISIT
[FreeTextEntry3] : Written by Bernice Worthy PA-C, acting as a scribe for Dr. Bharti Navarro.\par This note accurately reflects the work and decisions made by me.\par

## 2023-01-04 RX ORDER — NAPROXEN 500 MG/1
500 TABLET ORAL
Qty: 10 | Refills: 0 | Status: ACTIVE | COMMUNITY
Start: 2023-01-04 | End: 1900-01-01

## 2023-01-09 ENCOUNTER — APPOINTMENT (OUTPATIENT)
Dept: GYNECOLOGIC ONCOLOGY | Facility: CLINIC | Age: 53
End: 2023-01-09
Payer: COMMERCIAL

## 2023-01-09 VITALS
OXYGEN SATURATION: 98 % | WEIGHT: 278 LBS | DIASTOLIC BLOOD PRESSURE: 76 MMHG | SYSTOLIC BLOOD PRESSURE: 133 MMHG | HEIGHT: 63 IN | HEART RATE: 85 BPM | BODY MASS INDEX: 49.26 KG/M2

## 2023-01-09 PROCEDURE — 99024 POSTOP FOLLOW-UP VISIT: CPT

## 2023-01-09 NOTE — REASON FOR VISIT
[FreeTextEntry1] : [place] Location \par \par [facility] [place]. [phone]\par [address] [place] [address]

## 2023-01-09 NOTE — HISTORY OF PRESENT ILLNESS
[FreeTextEntry1] : 51 yo female s/p exploratory laparotomy, JOANNE BSO KELSIE resection of anterior abdominal wall masses, pelvic puss evacuation and irrigation abdominal cavity washout 11/30/22. She was last seen as a new patient in the office and post operatively on 12/12/22 she was recovering well and staples were removed without complication. Patient was advised to follow up in 4 weeks for a cuff check and to review final pathology results. \par \par Final Diagnosis\par 1. Periumbilical lymph node, excision:\par -  Stellate shaped necrotizing granuloma with surrounding benign vascular\par proliferation, see note\par \par Note: Could represent infected urachal cyst / remnant. Correlate\par clinically\par \par 2. Uterus, cervix, bilateral fallopian tubes and bilateral ovaries, total\par hysterectomy with bilateral salpingo-oopherectomy:\par -  Endometrium: Benign endometrial polyp\par - Myometrium: Leiomyoma with infarction, focal surface hemorrhage and\par acute inflammation\par - Acute serositis\par - Cervix: Unremarkable\par - Right fallopian tube: Complete cross section and surface/ fimbrial acute\par infalmmation and necrosis\par - Left fallopian tube: Complete cross section and surface/ fimbrial acute\par infalmmation and necrosis\par - Right ovary: Benign ovary with surface hemorrhage\par - Left ovary: Benign ovary with surface hemorrhage and acute inflammatory\par infiltrate\par

## 2023-01-09 NOTE — ASSESSMENT
[FreeTextEntry1] : Pt is a 53 yo s/p exploratory laparotomy, JOANNE BSO KELSIE resection of anterior abdominal wall masses, pelvic puss evacuation and irrigation abdominal cavity washout 11/30/22. Pathology with no evidence of malignancy. Recovered well. Vaginal cuff intact. Return to normal activity. Discharge from clinic.

## 2023-01-09 NOTE — END OF VISIT
[FreeTextEntry3] : Discharge to routine preventative/gynecologic care [FreeTextEntry2] : Ewa Santacruz MA was present the entire duration of the patient interaction and gynecological exam.\par

## 2023-02-02 NOTE — CHART NOTE - NSCHARTNOTEFT_GEN_A_CORE
Continue Xarelto as directed by your doctor. Follow up with hematology. If you have any chest pain or shortness of breath, go to the ER. GYNECOLOGIC ONCOLOGY PROGRESS NOTE    52 y.o. POD#1 s/p exploratory laparotomy, JOANNE, BSO, abdominal wash out and removal of 2L of ascitic fluid secondary to uterine fibroids.     PROBLEMS:  T2DM  HTN  Asthma  UC  Anemia    Pt seen and examined at bedside.     SUBJECTIVE:    Patient is without complaints.  Pain well-controlled.  Flatus: none  Denies Nausea, Vomiting or Diarrhea.  Denies shortness of breath, chest pain or dyspnea on exertion.  Reports hunger and thirst; desires to attempt PO.    OBJECTIVE:     VITALS:  T(F): 97.4 (22 @ 23:15), Max: 100.2 (22 @ 03:25)  HR: 108 (22 @ 23:15) (102 - 136)  BP: 140/60 (22 @ 23:15) (90/58 - 141/111)  RR: 17 (22 @ 23:15) (14 - 22)  SpO2: 99% (22 @ 23:15) (93% - 100%)    I&O's Summary    2022 07:01  -  01 Dec 2022 00:16  --------------------------------------------------------  IN: 0 mL / OUT: 150 mL / NET: -150 mL  UOP: 87.5cc/hr for 4 hours      MEDICATIONS  (STANDING):  acetaminophen     Tablet .. 975 milliGRAM(s) Oral every 6 hours  amLODIPine   Tablet 10 milliGRAM(s) Oral daily  atorvastatin 10 milliGRAM(s) Oral at bedtime  budesonide  80 MICROgram(s)/formoterol 4.5 MICROgram(s) Inhaler 2 Puff(s) Inhalation two times a day  dextrose 5%. 1000 milliLiter(s) (50 mL/Hr) IV Continuous <Continuous>  dextrose 5%. 1000 milliLiter(s) (100 mL/Hr) IV Continuous <Continuous>  dextrose 50% Injectable 25 Gram(s) IV Push once  dextrose 50% Injectable 12.5 Gram(s) IV Push once  dextrose 50% Injectable 25 Gram(s) IV Push once  enoxaparin Injectable 40 milliGRAM(s) SubCutaneous every 12 hours  gabapentin 300 milliGRAM(s) Oral every 8 hours  glucagon  Injectable 1 milliGRAM(s) IntraMuscular once  heparin   Injectable 5000 Unit(s) SubCutaneous once  HYDROmorphone PCA (1 mG/mL) 30 milliLiter(s) PCA Continuous PCA Continuous  insulin lispro (ADMELOG) corrective regimen sliding scale   SubCutaneous three times a day before meals  insulin lispro (ADMELOG) corrective regimen sliding scale   SubCutaneous at bedtime  lactated ringers Bolus 1000 milliLiter(s) IV Bolus once  lactated ringers. 1000 milliLiter(s) (75 mL/Hr) IV Continuous <Continuous>  lactated ringers. 1000 milliLiter(s) (185 mL/Hr) IV Continuous <Continuous>  metoprolol succinate ER 25 milliGRAM(s) Oral daily  pantoprazole    Tablet 20 milliGRAM(s) Oral daily  piperacillin/tazobactam IVPB.. 3.375 Gram(s) IV Intermittent every 8 hours  potassium chloride    Tablet ER 10 milliEquivalent(s) Oral daily  tiotropium 2.5 MICROgram(s) Inhaler 2 Puff(s) Inhalation daily    MEDICATIONS  (PRN):  acetaminophen     Tablet .. 975 milliGRAM(s) Oral every 6 hours PRN Mild Pain (1 - 3)  albuterol    90 MICROgram(s) HFA Inhaler 2 Puff(s) Inhalation every 6 hours PRN Shortness of Breath and/or Wheezing  dextrose Oral Gel 15 Gram(s) Oral once PRN Blood Glucose LESS THAN 70 milliGRAM(s)/deciliter  fentaNYL    Injectable 25 MICROGram(s) IV Push every 5 minutes PRN Mild Pain (1 - 3)  HYDROmorphone  Injectable 0.5 milliGRAM(s) IV Push every 10 minutes PRN Moderate Pain (4 - 6)  HYDROmorphone  Injectable 1 milliGRAM(s) IV Push every 10 minutes PRN Severe Pain (7 - 10)  HYDROmorphone  Injectable 0.5 milliGRAM(s) IV Push every 10 minutes PRN Moderate Pain (4 - 6)  mesalamine Suppository 1000 milliGRAM(s) Rectal at bedtime PRN ulcerative colitis  naloxone Injectable 0.1 milliGRAM(s) IV Push every 3 minutes PRN For ANY of the following changes in patient status:  A. RR LESS THAN 10 breaths per minute, B. Oxygen saturation LESS THAN 90%, C. Sedation score of 6  ondansetron    Tablet 8 milliGRAM(s) Oral every 8 hours PRN Nausea and/or Vomiting  ondansetron Injectable 4 milliGRAM(s) IV Push every 6 hours PRN Nausea  oxyCODONE    IR 5 milliGRAM(s) Oral every 3 hours PRN Moderate Pain (4 - 6)  oxyCODONE    IR 10 milliGRAM(s) Oral every 3 hours PRN Severe Pain (7 - 10)  simethicone 80 milliGRAM(s) Chew every 6 hours PRN Gas      Physical Exam:  Constitutional: NAD  Pulmonary: clear to auscultation bilaterally   Cardiovascular: Regular rate and rhythm   Abdomen: soft, non-tender, non-distended, hypoactive bowel sounds  Extremities: no lower extremity edema or calve tenderness, Guillermo's sign negative.  Incision: Clean, dry, intact with prevena in place.  Without signs of infection or hernia.    LABS:                        9.4    18.12 )-----------( 502      ( 2022 16:35 )             30.1     11-30    134<L>  |  97  |  29.6<H>  ----------------------------<  132<H>  4.6   |  21.0<L>  |  1.95<H>    Ca    9.0      2022 13:15    TPro  7.6  /  Alb  2.7<L>  /  TBili  1.4  /  DBili  x   /  AST  15  /  ALT  14  /  AlkPhos  64  11-30    PT/INR - ( 2022 16:35 )   PT: 17.5 sec;   INR: 1.50 ratio         PTT - ( 2022 16:35 )  PTT:25.8 sec  Urinalysis Basic - ( 2022 19:02 )    Color: Yellow / Appearance: Clear / S.010 / pH: x  Gluc: x / Ketone: Trace  / Bili: Small / Urobili: 1   Blood: x / Protein: 30 mg/dL / Nitrite: Positive   Leuk Esterase: Trace / RBC: 0-2 /HPF / WBC 3-5 /HPF   Sq Epi: x / Non Sq Epi: Few / Bacteria: Few            A/P: 52 y.o. POD#1 s/p exploratory laparotomy, JOANNE, BSO, abdominal wash out and removal of 2L of ascitic fluid secondary to uterine fibroids.   Diagnosis: fibroid uterus, low suspicion for leiomyosarcoma.   Neuro: Pain well controlled. Continue current pain regimen with PCA, tylenol, and gabapentin.  CV: History of HTN, on metoprolol, valsartan, and amlodipine. Blood pressure 140/60.  Pulm: History of asthma, on albuterol, spiriva, and symbicort. Saturating 99% on room air. Incentive spirometer use encouraged  GI: History of UC, not on medications. Bowel sounds normal, tolerating PO diet. Continue current bowel regimen.   : Miguel in place; TOV for tomorrow.   Heme: history of anemia. Hgb 11.1 ->9.3 AM labs pending  ID: Afebrile currently, but with purulent material noted in fibroids during case; patient to continue zosyn.  Endo: T2DM; SSI ordered   FEN: IVF at 185cc/hr. Will discontinue IVF when tolerating PO. Electrolytes WNL. AM labs pending.   Skin: No active disease.   Psych: No active disease.   DVT ppx: Ambulation encouraged, SCDs when in bed, heparin x1 ordered; plan to transition to lovenox.  Dispo: continue inpatient management. GYNECOLOGIC ONCOLOGY PROGRESS NOTE    52 y.o. POD#1 s/p exploratory laparotomy, JOANNE, BSO, abdominal wash out and removal of 2L of ascitic fluid secondary to uterine fibroids.     PROBLEMS:  T2DM  HTN  Asthma  UC  Anemia    Pt seen and examined at bedside.     SUBJECTIVE:    Patient is without complaints.  Pain well-controlled.  Flatus: none  Denies Nausea, Vomiting or Diarrhea.  Denies shortness of breath, chest pain or dyspnea on exertion.  Reports hunger and thirst; desires to attempt PO.    OBJECTIVE:     VITALS:  T(F): 97.4 (22 @ 23:15), Max: 100.2 (22 @ 03:25)  HR: 108 (22 @ 23:15) (102 - 136)  BP: 140/60 (22 @ 23:15) (90/58 - 141/111)  RR: 17 (22 @ 23:15) (14 - 22)  SpO2: 99% (22 @ 23:15) (93% - 100%)    I&O's Summary    2022 07:01  -  01 Dec 2022 00:16  --------------------------------------------------------  IN: 0 mL / OUT: 150 mL / NET: -150 mL  UOP: 87.5cc/hr for 4 hours      MEDICATIONS  (STANDING):  acetaminophen     Tablet .. 975 milliGRAM(s) Oral every 6 hours  amLODIPine   Tablet 10 milliGRAM(s) Oral daily  atorvastatin 10 milliGRAM(s) Oral at bedtime  budesonide  80 MICROgram(s)/formoterol 4.5 MICROgram(s) Inhaler 2 Puff(s) Inhalation two times a day  dextrose 5%. 1000 milliLiter(s) (50 mL/Hr) IV Continuous <Continuous>  dextrose 5%. 1000 milliLiter(s) (100 mL/Hr) IV Continuous <Continuous>  dextrose 50% Injectable 25 Gram(s) IV Push once  dextrose 50% Injectable 12.5 Gram(s) IV Push once  dextrose 50% Injectable 25 Gram(s) IV Push once  enoxaparin Injectable 40 milliGRAM(s) SubCutaneous every 12 hours  gabapentin 300 milliGRAM(s) Oral every 8 hours  glucagon  Injectable 1 milliGRAM(s) IntraMuscular once  heparin   Injectable 5000 Unit(s) SubCutaneous once  HYDROmorphone PCA (1 mG/mL) 30 milliLiter(s) PCA Continuous PCA Continuous  insulin lispro (ADMELOG) corrective regimen sliding scale   SubCutaneous three times a day before meals  insulin lispro (ADMELOG) corrective regimen sliding scale   SubCutaneous at bedtime  lactated ringers Bolus 1000 milliLiter(s) IV Bolus once  lactated ringers. 1000 milliLiter(s) (75 mL/Hr) IV Continuous <Continuous>  lactated ringers. 1000 milliLiter(s) (185 mL/Hr) IV Continuous <Continuous>  metoprolol succinate ER 25 milliGRAM(s) Oral daily  pantoprazole    Tablet 20 milliGRAM(s) Oral daily  piperacillin/tazobactam IVPB.. 3.375 Gram(s) IV Intermittent every 8 hours  potassium chloride    Tablet ER 10 milliEquivalent(s) Oral daily  tiotropium 2.5 MICROgram(s) Inhaler 2 Puff(s) Inhalation daily    MEDICATIONS  (PRN):  acetaminophen     Tablet .. 975 milliGRAM(s) Oral every 6 hours PRN Mild Pain (1 - 3)  albuterol    90 MICROgram(s) HFA Inhaler 2 Puff(s) Inhalation every 6 hours PRN Shortness of Breath and/or Wheezing  dextrose Oral Gel 15 Gram(s) Oral once PRN Blood Glucose LESS THAN 70 milliGRAM(s)/deciliter  fentaNYL    Injectable 25 MICROGram(s) IV Push every 5 minutes PRN Mild Pain (1 - 3)  HYDROmorphone  Injectable 0.5 milliGRAM(s) IV Push every 10 minutes PRN Moderate Pain (4 - 6)  HYDROmorphone  Injectable 1 milliGRAM(s) IV Push every 10 minutes PRN Severe Pain (7 - 10)  HYDROmorphone  Injectable 0.5 milliGRAM(s) IV Push every 10 minutes PRN Moderate Pain (4 - 6)  mesalamine Suppository 1000 milliGRAM(s) Rectal at bedtime PRN ulcerative colitis  naloxone Injectable 0.1 milliGRAM(s) IV Push every 3 minutes PRN For ANY of the following changes in patient status:  A. RR LESS THAN 10 breaths per minute, B. Oxygen saturation LESS THAN 90%, C. Sedation score of 6  ondansetron    Tablet 8 milliGRAM(s) Oral every 8 hours PRN Nausea and/or Vomiting  ondansetron Injectable 4 milliGRAM(s) IV Push every 6 hours PRN Nausea  oxyCODONE    IR 5 milliGRAM(s) Oral every 3 hours PRN Moderate Pain (4 - 6)  oxyCODONE    IR 10 milliGRAM(s) Oral every 3 hours PRN Severe Pain (7 - 10)  simethicone 80 milliGRAM(s) Chew every 6 hours PRN Gas      Physical Exam:  Constitutional: NAD  Pulmonary: clear to auscultation bilaterally   Cardiovascular: Regular rate and rhythm   Abdomen: soft, non-tender, non-distended, hypoactive bowel sounds  Extremities: no lower extremity edema or calve tenderness, Guillermo's sign negative.  Incision: Clean, dry, intact with prevena in place.  Without signs of infection or hernia.    LABS:                        9.4    18.12 )-----------( 502      ( 2022 16:35 )             30.1     11-30    134<L>  |  97  |  29.6<H>  ----------------------------<  132<H>  4.6   |  21.0<L>  |  1.95<H>    Ca    9.0      2022 13:15    TPro  7.6  /  Alb  2.7<L>  /  TBili  1.4  /  DBili  x   /  AST  15  /  ALT  14  /  AlkPhos  64  11-30    PT/INR - ( 2022 16:35 )   PT: 17.5 sec;   INR: 1.50 ratio         PTT - ( 2022 16:35 )  PTT:25.8 sec  Urinalysis Basic - ( 2022 19:02 )    Color: Yellow / Appearance: Clear / S.010 / pH: x  Gluc: x / Ketone: Trace  / Bili: Small / Urobili: 1   Blood: x / Protein: 30 mg/dL / Nitrite: Positive   Leuk Esterase: Trace / RBC: 0-2 /HPF / WBC 3-5 /HPF   Sq Epi: x / Non Sq Epi: Few / Bacteria: Few            A/P: 52 y.o. POD#1 s/p exploratory laparotomy, JOANNE, BSO, abdominal wash out and removal of 2L of ascitic fluid secondary to uterine fibroids.   Diagnosis: fibroid uterus, low suspicion for leiomyosarcoma.   Neuro: Pain well controlled. Continue current pain regimen with PCA, tylenol, and gabapentin.  CV: History of HTN, on metoprolol, valsartan, and amlodipine. Blood pressure 140/60.  Pulm: History of asthma, on albuterol, spiriva, and symbicort. Saturating 99% on 2L NC. Incentive spirometer use encouraged  GI: History of UC, not on medications. Bowel sounds normal, tolerating PO diet. Continue current bowel regimen.   : Miguel in place; TOV for tomorrow.   Heme: history of anemia. Hgb 11.1 ->9.3 AM labs pending  ID: Afebrile currently, but with purulent material noted in fibroids during case; patient to continue zosyn.  Endo: T2DM; SSI ordered   FEN: IVF at 185cc/hr. Will discontinue IVF when tolerating PO. Electrolytes WNL. AM labs pending.   Skin: No active disease.   Psych: No active disease.   DVT ppx: Ambulation encouraged, SCDs when in bed, heparin x1 ordered; plan to transition to lovenox.  Dispo: continue inpatient management.

## 2023-02-05 NOTE — CHART NOTE - NSCHARTNOTEFT_GEN_A_CORE
CDI Query 1:  - Pathology report showing acute serositis, this may be related to abdominopelvic abscess or underlying disease.    CDI Query 2:  - Patient was treated with IV Zosyn for a suspected abdominopelvic abscess.

## 2023-02-05 NOTE — CDI QUERY NOTE - NSCDIOTHERTXTBX2_GEN_ALL_CORE_FT
This patient was documented to have an abscess in the brief op note.  This patient underwent an Exploratory laparotomy, Resection of anterior abdominal wall masses, pelvic pus evacuation, JOANNE and BSO (total abdominal hysterectomy and bilateral salpingo-oophorectomy), & Washout of abdominal cavity on 11/30.  She was given IV Zosyn for 7 days for an intra abdominal infection.       Can the location of the infection and/or abscess be clarified, after study?    -Patient was treated with IV Zosyn for a suspected intra-abdominal abscess  -Patient was treated with IV Zosyn for a suspected abdominopelvic abscess  -Patient was treated with IV Zosyn for a suspected peritoneal abscess  -Other  -Not clinically significant      Chart Documentation:      Orders:  11/30/22 - 12/7/22  Zosyn IVPB 3.375 grams every 8 hours, infuse over 4 hours, stop after 7 days  Indication: intra-abdominal infection      11/30/22 Brief OP note:  Evidence of Infection or Abscess:  · Evidence of infection or abscess identified at the start or during the surgical procedure:	Yes   · Select type of infection:	purulence   · Please describe:	Abdomen filled with 2L of purulent ascites with necrotic ruptured uterine mass      11/30/22 OP note:  ...There was copious yellow/green fluid coming out of the peritoneal cavity. Pool suction was used and incision was extended to more efficiently drain the [pus] from the abdomen. Total of 2200cc of purulent fluid was removed from the abdomen and pelvis.  The omentum was noted to have significant inflammatory change with dense adhesion to the right uterine fundus at eh level of uterine rupture with [ous seen] coming out of the uterine cavity...      < from: CT Abdomen and Pelvis w/ IV Cont (11.29.22 @ 17:20) >    REPRODUCTIVE ORGANS: Markedly enlarged and abnormal appearance of the   uterus with multiple myometrial masses many of which are calcified. In   the right uterine fundus, there is a partially calcified fluid density   mass measuring 10.9 x 15.7 x 16.3 cm (series 3, image 95), with   intralesional foci of gas.    IMPRESSION:  Markedly abnormal and enlarged uterus with suspected necrosis of a 16.3   cm right fundal uterine mass. The mass contains small foci of gas,   raising concern for superimposed infection.    Abnormal small to moderate amount of abdominal and pelvic ascites with   anterior peritoneal soft tissue stranding. In the presence of thelarge   uterine mass, differential considerations does include possible uterine   malignancy such as leiomyosarcoma with peritoneal carcinomatosis.    Recommend GYN consultation. Further evaluation with MRI of the pelvis   should also be considered.    < end of copied text >

## 2023-02-05 NOTE — CDI QUERY NOTE - NSCDIOTHERTXTBX_GEN_ALL_CORE_HH
This patient underwent an Exploratory laparotomy, Resection of anterior abdominal wall masses, pelvic pus evacuation, JOANNE and BSO (total abdominal hysterectomy and bilateral salpingo-oophorectomy), & Washout of abdominal cavity on 11/30.  The path report includes a diagnosis of acute serositis.      Can the diagnosis associated with the path report be clarified if clinically significant, after study?    -Patient was found to have acute multiple serositis on path  -Path findings were not clinically significant  -Other      Chart documentation:    OP note:  ...The omentum was noted to have significant inflammatory change with dense adhesion to the right uterine fundus at the level of uterine rupture with [pus seen] coming out of the uterine cavity...      Surgical Pathology Report (11.30.22 @ 18:00)   Surgical Pathology Report:   ACCESSION No: [id number]   Received Date/Time: 12/1/2022 11:19 EST   Surgical Pathology Report - Auth (Verified)   Specimen(s) Submitted   1 Periumbilical lymph node   2 Uterus, cervix, bilateral fallopian tubes, ovaries   Final Diagnosis   1. Periumbilical lymph node, excision:   - Stellate shaped necrotizing granuloma with surrounding benign vascular   proliferation, see note   Note: Could represent infected urachal cyst / remnant. Correlate   clinically   2. Uterus, cervix, bilateral fallopian tubes and bilateral ovaries, total   hysterectomy with bilateral salpingo-oopherectomy:   - Endometrium: Benign endometrial polyp   - Myometrium: Leiomyoma with infarction, focal surface hemorrhage and   acute inflammation   - Acute serositis   - Cervix: Unremarkable   - Right fallopian tube: Complete cross section and surface/ fimbrial acute   infalmmation and necrosis   - Left fallopian tube: Complete cross section and surface/ fimbrial acute   infalmmation and necrosis   - Right ovary: Benign ovary with surface hemorrhage   - Left ovary: Benign ovary with surface hemorrhage and acute inflammatory   infiltrate   Note: ALK was performed at [facility] on block 2T and reported at [facility] as   negative in area of interest. Controls are appropriate.   The case was reviewed intradepartmentally.   Verified by: Roxann Chisholm MD   (Electronic Signature)   Reported on: 12/15/22 16:01 EST, [facility], [address] [place] [address]

## 2023-03-24 ENCOUNTER — APPOINTMENT (OUTPATIENT)
Dept: OBGYN | Facility: CLINIC | Age: 53
End: 2023-03-24
Payer: COMMERCIAL

## 2023-03-24 ENCOUNTER — TRANSCRIPTION ENCOUNTER (OUTPATIENT)
Age: 53
End: 2023-03-24

## 2023-03-24 VITALS
SYSTOLIC BLOOD PRESSURE: 143 MMHG | HEIGHT: 63 IN | DIASTOLIC BLOOD PRESSURE: 77 MMHG | BODY MASS INDEX: 50.68 KG/M2 | WEIGHT: 286 LBS

## 2023-03-24 DIAGNOSIS — N90.5 ATROPHY OF VULVA: ICD-10-CM

## 2023-03-24 DIAGNOSIS — N95.2 POSTMENOPAUSAL ATROPHIC VAGINITIS: ICD-10-CM

## 2023-03-24 PROCEDURE — 99396 PREV VISIT EST AGE 40-64: CPT

## 2023-03-24 RX ORDER — FLUCONAZOLE 200 MG/1
200 TABLET ORAL
Qty: 1 | Refills: 6 | Status: ACTIVE | COMMUNITY
Start: 2023-03-24 | End: 1900-01-01

## 2023-03-24 NOTE — HISTORY OF PRESENT ILLNESS
[Y] : Patient is sexually active [N] : Patient denies prior pregnancies [Menarche Age: ____] : age at menarche was [unfilled] [PGHxTotal] : 0 [PGHxFullTerm] : 0 [PGHxPremature] : 0 [PGHxAbortions] : 0 [[facility] : [facility] [PGHxABInduced] : 0 [PGHxABSpont] : 0 [PGHxEctopic] : 0 [PGHxMultBirths] : 0

## 2023-03-24 NOTE — PHYSICAL EXAM
[Chaperone Present] : A chaperone was present in the examining room during all aspects of the physical examination [Appropriately responsive] : appropriately responsive [Alert] : alert [No Acute Distress] : no acute distress [No Lymphadenopathy] : no lymphadenopathy [Regular Rate Rhythm] : regular rate rhythm [No Murmurs] : no murmurs [Clear to Auscultation B/L] : clear to auscultation bilaterally [Soft] : soft [Non-tender] : non-tender [Non-distended] : non-distended [No HSM] : No HSM [No Lesions] : no lesions [No Mass] : no mass [Oriented x3] : oriented x3 [Examination Of The Breasts] : a normal appearance [No Masses] : no breast masses were palpable [Vulvar Atrophy] : vulvar atrophy [Labia Majora] : normal [Labia Minora] : normal [Normal] : normal [Atrophy] : atrophy [Absent] : absent [Uterine Adnexae] : normal

## 2023-03-29 LAB — CYTOLOGY CVX/VAG DOC THIN PREP: NORMAL

## 2025-01-29 ENCOUNTER — RX CHANGE (OUTPATIENT)
Age: 55
End: 2025-01-29

## 2025-01-29 ENCOUNTER — NON-APPOINTMENT (OUTPATIENT)
Age: 55
End: 2025-01-29

## 2025-01-29 ENCOUNTER — APPOINTMENT (OUTPATIENT)
Dept: INTERNAL MEDICINE | Facility: CLINIC | Age: 55
End: 2025-01-29
Payer: COMMERCIAL

## 2025-01-29 VITALS
DIASTOLIC BLOOD PRESSURE: 70 MMHG | WEIGHT: 293 LBS | TEMPERATURE: 97 F | OXYGEN SATURATION: 96 % | SYSTOLIC BLOOD PRESSURE: 130 MMHG | HEIGHT: 63 IN | HEART RATE: 82 BPM | BODY MASS INDEX: 51.91 KG/M2

## 2025-01-29 DIAGNOSIS — Z87.19 PERSONAL HISTORY OF OTHER DISEASES OF THE DIGESTIVE SYSTEM: ICD-10-CM

## 2025-01-29 DIAGNOSIS — Z87.09 PERSONAL HISTORY OF OTHER DISEASES OF THE RESPIRATORY SYSTEM: ICD-10-CM

## 2025-01-29 DIAGNOSIS — E66.9 OBESITY, UNSPECIFIED: ICD-10-CM

## 2025-01-29 DIAGNOSIS — Z00.00 ENCOUNTER FOR GENERAL ADULT MEDICAL EXAMINATION W/OUT ABNORMAL FINDINGS: ICD-10-CM

## 2025-01-29 DIAGNOSIS — Z86.39 PERSONAL HISTORY OF OTHER ENDOCRINE, NUTRITIONAL AND METABOLIC DISEASE: ICD-10-CM

## 2025-01-29 DIAGNOSIS — E88.810 METABOLIC SYNDROME: ICD-10-CM

## 2025-01-29 DIAGNOSIS — I10 ESSENTIAL (PRIMARY) HYPERTENSION: ICD-10-CM

## 2025-01-29 DIAGNOSIS — M25.519 PAIN IN UNSPECIFIED SHOULDER: ICD-10-CM

## 2025-01-29 DIAGNOSIS — E78.5 HYPERLIPIDEMIA, UNSPECIFIED: ICD-10-CM

## 2025-01-29 PROCEDURE — 36415 COLL VENOUS BLD VENIPUNCTURE: CPT

## 2025-01-29 PROCEDURE — 99386 PREV VISIT NEW AGE 40-64: CPT

## 2025-01-29 PROCEDURE — 93000 ELECTROCARDIOGRAM COMPLETE: CPT | Mod: 59

## 2025-01-29 PROCEDURE — G0444 DEPRESSION SCREEN ANNUAL: CPT | Mod: 59

## 2025-01-29 RX ORDER — ALBUTEROL SULFATE 90 UG/1
108 (90 BASE) INHALANT RESPIRATORY (INHALATION)
Qty: 1 | Refills: 5 | Status: ACTIVE | COMMUNITY
Start: 2025-01-29 | End: 1900-01-01

## 2025-01-29 RX ORDER — FLUTICASONE FUROATE, UMECLIDINIUM BROMIDE AND VILANTEROL TRIFENATATE 100; 62.5; 25 UG/1; UG/1; UG/1
100-62.5-25 POWDER RESPIRATORY (INHALATION) DAILY
Qty: 1 | Refills: 3 | Status: ACTIVE | COMMUNITY
Start: 2025-01-29 | End: 1900-01-01

## 2025-01-29 RX ORDER — METHOCARBAMOL 500 MG/1
500 TABLET, FILM COATED ORAL 4 TIMES DAILY
Qty: 240 | Refills: 0 | Status: ACTIVE | COMMUNITY
Start: 2025-01-29 | End: 1900-01-01

## 2025-01-29 RX ORDER — PREDNISONE 20 MG/1
20 TABLET ORAL DAILY
Qty: 10 | Refills: 0 | Status: ACTIVE | COMMUNITY
Start: 2025-01-29 | End: 1900-01-01

## 2025-01-30 LAB
ALBUMIN SERPL ELPH-MCNC: 4.2 G/DL
ALP BLD-CCNC: 86 U/L
ALT SERPL-CCNC: 8 U/L
ANION GAP SERPL CALC-SCNC: 12 MMOL/L
APPEARANCE: CLEAR
AST SERPL-CCNC: 13 U/L
BACTERIA: NEGATIVE /HPF
BASOPHILS # BLD AUTO: 0.03 K/UL
BASOPHILS NFR BLD AUTO: 0.7 %
BILIRUB SERPL-MCNC: 0.5 MG/DL
BILIRUBIN URINE: NEGATIVE
BLOOD URINE: NEGATIVE
BUN SERPL-MCNC: 9 MG/DL
CALCIUM SERPL-MCNC: 9.3 MG/DL
CAST: 0 /LPF
CHLORIDE SERPL-SCNC: 104 MMOL/L
CHOLEST SERPL-MCNC: 147 MG/DL
CO2 SERPL-SCNC: 26 MMOL/L
COLOR: YELLOW
CREAT SERPL-MCNC: 0.73 MG/DL
EGFR: 97 ML/MIN/1.73M2
EOSINOPHIL # BLD AUTO: 0.12 K/UL
EOSINOPHIL NFR BLD AUTO: 2.6 %
EPITHELIAL CELLS: 2 /HPF
ESTIMATED AVERAGE GLUCOSE: 143 MG/DL
GLUCOSE QUALITATIVE U: NEGATIVE MG/DL
GLUCOSE SERPL-MCNC: 90 MG/DL
HBA1C MFR BLD HPLC: 6.6 %
HCT VFR BLD CALC: 40.1 %
HDLC SERPL-MCNC: 37 MG/DL
HGB BLD-MCNC: 12.9 G/DL
IMM GRANULOCYTES NFR BLD AUTO: 0.2 %
KETONES URINE: NEGATIVE MG/DL
LDLC SERPL CALC-MCNC: 92 MG/DL
LEUKOCYTE ESTERASE URINE: ABNORMAL
LYMPHOCYTES # BLD AUTO: 2.03 K/UL
LYMPHOCYTES NFR BLD AUTO: 44 %
MAN DIFF?: NORMAL
MCHC RBC-ENTMCNC: 28.7 PG
MCHC RBC-ENTMCNC: 32.2 G/DL
MCV RBC AUTO: 89.1 FL
MICROSCOPIC-UA: NORMAL
MONOCYTES # BLD AUTO: 0.41 K/UL
MONOCYTES NFR BLD AUTO: 8.9 %
NEUTROPHILS # BLD AUTO: 2.01 K/UL
NEUTROPHILS NFR BLD AUTO: 43.6 %
NITRITE URINE: NEGATIVE
NONHDLC SERPL-MCNC: 110 MG/DL
PH URINE: 6.5
PLATELET # BLD AUTO: 338 K/UL
POTASSIUM SERPL-SCNC: 3.6 MMOL/L
PROT SERPL-MCNC: 6.9 G/DL
PROTEIN URINE: NORMAL MG/DL
RBC # BLD: 4.5 M/UL
RBC # FLD: 14.2 %
RED BLOOD CELLS URINE: 1 /HPF
SODIUM SERPL-SCNC: 141 MMOL/L
SPECIFIC GRAVITY URINE: 1.02
TRIGL SERPL-MCNC: 102 MG/DL
TSH SERPL-ACNC: 1.59 UIU/ML
UROBILINOGEN URINE: 1 MG/DL
WBC # FLD AUTO: 4.61 K/UL
WHITE BLOOD CELLS URINE: 0 /HPF

## 2025-01-31 RX ORDER — AMLODIPINE AND VALSARTAN 10; 160 MG/1; MG/1
10-160 TABLET, FILM COATED ORAL DAILY
Qty: 90 | Refills: 3 | Status: ACTIVE | COMMUNITY
Start: 2025-01-29 | End: 1900-01-01

## 2025-02-05 RX ORDER — TIRZEPATIDE 2.5 MG/.5ML
2.5 INJECTION, SOLUTION SUBCUTANEOUS
Qty: 2 | Refills: 1 | Status: ACTIVE | COMMUNITY
Start: 2025-01-29

## 2025-02-25 ENCOUNTER — OUTPATIENT (OUTPATIENT)
Dept: OUTPATIENT SERVICES | Facility: HOSPITAL | Age: 55
LOS: 1 days | End: 2025-02-25
Payer: COMMERCIAL

## 2025-02-25 DIAGNOSIS — Z98.890 OTHER SPECIFIED POSTPROCEDURAL STATES: Chronic | ICD-10-CM

## 2025-02-25 DIAGNOSIS — G47.33 OBSTRUCTIVE SLEEP APNEA (ADULT) (PEDIATRIC): ICD-10-CM

## 2025-02-25 PROCEDURE — 95800 SLP STDY UNATTENDED: CPT | Mod: 26

## 2025-02-25 PROCEDURE — 95800 SLP STDY UNATTENDED: CPT

## 2025-03-03 DIAGNOSIS — G47.33 OBSTRUCTIVE SLEEP APNEA (ADULT) (PEDIATRIC): ICD-10-CM

## 2025-03-05 ENCOUNTER — APPOINTMENT (OUTPATIENT)
Dept: INTERNAL MEDICINE | Facility: CLINIC | Age: 55
End: 2025-03-05
Payer: COMMERCIAL

## 2025-03-05 VITALS — WEIGHT: 293 LBS | HEIGHT: 63 IN | BODY MASS INDEX: 51.91 KG/M2

## 2025-03-05 VITALS — HEART RATE: 78 BPM | SYSTOLIC BLOOD PRESSURE: 128 MMHG | RESPIRATION RATE: 12 BRPM | DIASTOLIC BLOOD PRESSURE: 78 MMHG

## 2025-03-05 DIAGNOSIS — J30.1 ALLERGIC RHINITIS DUE TO POLLEN: ICD-10-CM

## 2025-03-05 DIAGNOSIS — E78.5 HYPERLIPIDEMIA, UNSPECIFIED: ICD-10-CM

## 2025-03-05 DIAGNOSIS — M25.561 PAIN IN RIGHT KNEE: ICD-10-CM

## 2025-03-05 DIAGNOSIS — I10 ESSENTIAL (PRIMARY) HYPERTENSION: ICD-10-CM

## 2025-03-05 PROBLEM — Z01.419 VISIT FOR GYNECOLOGIC EXAMINATION: Status: ACTIVE | Noted: 2025-03-05

## 2025-03-05 PROCEDURE — G2211 COMPLEX E/M VISIT ADD ON: CPT | Mod: NC

## 2025-03-05 PROCEDURE — 99214 OFFICE O/P EST MOD 30 MIN: CPT

## 2025-03-05 RX ORDER — METOPROLOL SUCCINATE 25 MG/1
25 TABLET, EXTENDED RELEASE ORAL DAILY
Qty: 90 | Refills: 1 | Status: ACTIVE | COMMUNITY
Start: 2025-03-05

## 2025-03-05 RX ORDER — AZELASTINE HYDROCHLORIDE 137 UG/1
0.1 SPRAY, METERED NASAL TWICE DAILY
Qty: 3 | Refills: 3 | Status: ACTIVE | COMMUNITY
Start: 2025-03-05 | End: 1900-01-01

## 2025-03-06 ENCOUNTER — OUTPATIENT (OUTPATIENT)
Dept: OUTPATIENT SERVICES | Facility: HOSPITAL | Age: 55
LOS: 1 days | End: 2025-03-06
Payer: COMMERCIAL

## 2025-03-06 DIAGNOSIS — M25.561 PAIN IN RIGHT KNEE: ICD-10-CM

## 2025-03-06 DIAGNOSIS — Z98.890 OTHER SPECIFIED POSTPROCEDURAL STATES: Chronic | ICD-10-CM

## 2025-03-06 PROCEDURE — 73030 X-RAY EXAM OF SHOULDER: CPT | Mod: 26,LT

## 2025-03-06 PROCEDURE — 73562 X-RAY EXAM OF KNEE 3: CPT | Mod: 26,RT

## 2025-03-11 ENCOUNTER — NON-APPOINTMENT (OUTPATIENT)
Age: 55
End: 2025-03-11

## 2025-03-12 ENCOUNTER — APPOINTMENT (OUTPATIENT)
Dept: OBGYN | Facility: CLINIC | Age: 55
End: 2025-03-12
Payer: COMMERCIAL

## 2025-03-12 VITALS
HEIGHT: 63 IN | WEIGHT: 293 LBS | BODY MASS INDEX: 51.91 KG/M2 | SYSTOLIC BLOOD PRESSURE: 130 MMHG | DIASTOLIC BLOOD PRESSURE: 80 MMHG

## 2025-03-12 DIAGNOSIS — E66.9 OBESITY, UNSPECIFIED: ICD-10-CM

## 2025-03-12 DIAGNOSIS — N95.2 POSTMENOPAUSAL ATROPHIC VAGINITIS: ICD-10-CM

## 2025-03-12 DIAGNOSIS — Z01.419 ENCOUNTER FOR GYNECOLOGICAL EXAMINATION (GENERAL) (ROUTINE) W/OUT ABNORMAL FINDINGS: ICD-10-CM

## 2025-03-12 DIAGNOSIS — N90.5 ATROPHY OF VULVA: ICD-10-CM

## 2025-03-12 PROCEDURE — 99214 OFFICE O/P EST MOD 30 MIN: CPT | Mod: 25

## 2025-03-12 PROCEDURE — 99459 PELVIC EXAMINATION: CPT

## 2025-03-12 PROCEDURE — 99396 PREV VISIT EST AGE 40-64: CPT

## 2025-03-12 RX ORDER — FLUCONAZOLE 200 MG/1
200 TABLET ORAL
Qty: 1 | Refills: 8 | Status: ACTIVE | COMMUNITY
Start: 2025-03-12 | End: 1900-01-01

## 2025-03-17 LAB — CYTOLOGY CVX/VAG DOC THIN PREP: NORMAL

## 2025-03-20 ENCOUNTER — APPOINTMENT (OUTPATIENT)
Dept: ORTHOPEDIC SURGERY | Facility: CLINIC | Age: 55
End: 2025-03-20
Payer: COMMERCIAL

## 2025-03-20 VITALS
HEART RATE: 93 BPM | HEIGHT: 63 IN | WEIGHT: 293 LBS | DIASTOLIC BLOOD PRESSURE: 69 MMHG | SYSTOLIC BLOOD PRESSURE: 115 MMHG | BODY MASS INDEX: 51.91 KG/M2

## 2025-03-20 DIAGNOSIS — M47.816 SPONDYLOSIS W/OUT MYELOPATHY OR RADICULOPATHY, LUMBAR REGION: ICD-10-CM

## 2025-03-20 DIAGNOSIS — Z86.39 PERSONAL HISTORY OF OTHER ENDOCRINE, NUTRITIONAL AND METABOLIC DISEASE: ICD-10-CM

## 2025-03-20 DIAGNOSIS — M43.16 SPONDYLOLISTHESIS, LUMBAR REGION: ICD-10-CM

## 2025-03-20 DIAGNOSIS — M67.952 UNSPECIFIED DISORDER OF SYNOVIUM AND TENDON, LEFT THIGH: ICD-10-CM

## 2025-03-20 PROCEDURE — 99204 OFFICE O/P NEW MOD 45 MIN: CPT

## 2025-03-20 PROCEDURE — 72100 X-RAY EXAM L-S SPINE 2/3 VWS: CPT

## 2025-03-20 RX ORDER — LATANOPROST/PF 0.005 %
DROPS OPHTHALMIC (EYE)
Refills: 0 | Status: ACTIVE | COMMUNITY

## 2025-03-20 RX ORDER — NAPROXEN 500 MG/1
500 TABLET ORAL
Qty: 30 | Refills: 0 | Status: ACTIVE | COMMUNITY
Start: 2025-03-20 | End: 1900-01-01

## 2025-03-29 ENCOUNTER — RX RENEWAL (OUTPATIENT)
Age: 55
End: 2025-03-29

## 2025-04-09 ENCOUNTER — APPOINTMENT (OUTPATIENT)
Dept: MRI IMAGING | Facility: CLINIC | Age: 55
End: 2025-04-09

## 2025-04-23 ENCOUNTER — APPOINTMENT (OUTPATIENT)
Dept: PULMONOLOGY | Facility: CLINIC | Age: 55
End: 2025-04-23
Payer: COMMERCIAL

## 2025-04-23 VITALS
OXYGEN SATURATION: 96 % | BODY MASS INDEX: 51.91 KG/M2 | HEIGHT: 63 IN | SYSTOLIC BLOOD PRESSURE: 116 MMHG | RESPIRATION RATE: 16 BRPM | WEIGHT: 293 LBS | HEART RATE: 89 BPM | DIASTOLIC BLOOD PRESSURE: 66 MMHG

## 2025-04-23 DIAGNOSIS — Z71.89 OTHER SPECIFIED COUNSELING: ICD-10-CM

## 2025-04-23 DIAGNOSIS — G47.33 OBSTRUCTIVE SLEEP APNEA (ADULT) (PEDIATRIC): ICD-10-CM

## 2025-04-23 DIAGNOSIS — Z87.09 PERSONAL HISTORY OF OTHER DISEASES OF THE RESPIRATORY SYSTEM: ICD-10-CM

## 2025-04-23 PROCEDURE — 99204 OFFICE O/P NEW MOD 45 MIN: CPT

## 2025-04-23 PROCEDURE — G2211 COMPLEX E/M VISIT ADD ON: CPT | Mod: NC

## 2025-04-23 RX ORDER — POTASSIUM CHLORIDE 10 MEQ
CAPSULE, EXTENDED RELEASE ORAL
Refills: 0 | Status: ACTIVE | COMMUNITY

## 2025-04-23 RX ORDER — LORATADINE 10 MG
CAPSULE ORAL
Refills: 0 | Status: ACTIVE | COMMUNITY

## 2025-04-23 RX ORDER — MAGNESIUM OXIDE/MAG AA CHELATE 300 MG
CAPSULE ORAL
Refills: 0 | Status: ACTIVE | COMMUNITY

## 2025-04-23 RX ORDER — DICLOFENAC SODIUM 10 MG/G
1 GEL TOPICAL
Refills: 0 | Status: ACTIVE | COMMUNITY

## 2025-04-23 RX ORDER — CETIRIZINE HYDROCHLORIDE 10 MG/1
TABLET, FILM COATED ORAL
Refills: 0 | Status: ACTIVE | COMMUNITY

## 2025-04-23 RX ORDER — DEXTRAN 70 AND HYPROMELLOSE 2910 1; 3 MG/ML; MG/ML
0.1-0.3 SOLUTION/ DROPS OPHTHALMIC
Refills: 0 | Status: ACTIVE | COMMUNITY

## 2025-04-23 RX ORDER — CHROMIUM 200 MCG
TABLET ORAL
Refills: 0 | Status: ACTIVE | COMMUNITY

## 2025-04-25 ENCOUNTER — APPOINTMENT (OUTPATIENT)
Dept: GASTROENTEROLOGY | Facility: CLINIC | Age: 55
End: 2025-04-25
Payer: COMMERCIAL

## 2025-04-25 VITALS
RESPIRATION RATE: 14 BRPM | WEIGHT: 293 LBS | DIASTOLIC BLOOD PRESSURE: 96 MMHG | HEART RATE: 100 BPM | SYSTOLIC BLOOD PRESSURE: 174 MMHG | BODY MASS INDEX: 51.91 KG/M2 | HEIGHT: 63 IN | OXYGEN SATURATION: 97 %

## 2025-04-25 DIAGNOSIS — K51.919 ULCERATIVE COLITIS, UNSPECIFIED WITH UNSPECIFIED COMPLICATIONS: ICD-10-CM

## 2025-04-25 DIAGNOSIS — Z12.11 ENCOUNTER FOR SCREENING FOR MALIGNANT NEOPLASM OF COLON: ICD-10-CM

## 2025-04-25 PROCEDURE — 99203 OFFICE O/P NEW LOW 30 MIN: CPT

## 2025-04-25 RX ORDER — NAPROXEN 500 MG/1
500 TABLET ORAL
Qty: 180 | Refills: 0 | Status: ACTIVE | COMMUNITY
Start: 2025-04-25 | End: 1900-01-01

## 2025-05-15 NOTE — ED ADULT NURSE NOTE - ED CARDIAC RHYTHM
1413 Assuming care of pt at this time. Bedside report received from outgoing RN.   1416 placed on RA, wife at bedside   1428 see changes, Dr Zamora at bedside   1443 no changes   1446 no changes, Discharge instructions provided to pt and family. Procedural report reviewed. Educated effects of anesthesia, homecoming care following procedure. Pt and family verbalizing understanding of all instructions provided at this time. All questions and concerns answered. Pt given contact information for provider.   1450 Pt assisted with dressing with help of family. IV removed dressing applied.   1503 Pt assisted to main lobby via  by transport. Dc in stable condition to home. All belongings taken with pt.    regular

## 2025-07-02 ENCOUNTER — APPOINTMENT (OUTPATIENT)
Dept: PULMONOLOGY | Facility: CLINIC | Age: 55
End: 2025-07-02
Payer: COMMERCIAL

## 2025-07-02 VITALS
RESPIRATION RATE: 16 BRPM | DIASTOLIC BLOOD PRESSURE: 78 MMHG | OXYGEN SATURATION: 97 % | HEART RATE: 73 BPM | SYSTOLIC BLOOD PRESSURE: 124 MMHG

## 2025-07-02 VITALS — HEIGHT: 63 IN | WEIGHT: 293 LBS | BODY MASS INDEX: 51.91 KG/M2

## 2025-07-02 PROBLEM — Z87.09 HISTORY OF ASTHMA: Status: RESOLVED | Noted: 2018-06-28 | Resolved: 2025-07-02

## 2025-07-02 PROCEDURE — 99214 OFFICE O/P EST MOD 30 MIN: CPT | Mod: 25

## 2025-07-02 PROCEDURE — 94010 BREATHING CAPACITY TEST: CPT

## 2025-07-09 ENCOUNTER — APPOINTMENT (OUTPATIENT)
Dept: INTERNAL MEDICINE | Facility: CLINIC | Age: 55
End: 2025-07-09
Payer: COMMERCIAL

## 2025-07-09 VITALS
BODY MASS INDEX: 51.91 KG/M2 | SYSTOLIC BLOOD PRESSURE: 128 MMHG | DIASTOLIC BLOOD PRESSURE: 81 MMHG | HEIGHT: 63 IN | WEIGHT: 293 LBS

## 2025-07-09 PROBLEM — Z86.39 HISTORY OF DIABETES MELLITUS: Status: RESOLVED | Noted: 2018-06-28 | Resolved: 2025-07-09

## 2025-07-09 PROCEDURE — G2211 COMPLEX E/M VISIT ADD ON: CPT | Mod: NC

## 2025-07-09 PROCEDURE — 99214 OFFICE O/P EST MOD 30 MIN: CPT

## 2025-07-09 PROCEDURE — 36415 COLL VENOUS BLD VENIPUNCTURE: CPT

## 2025-07-10 LAB
ALBUMIN SERPL ELPH-MCNC: 4 G/DL
ALP BLD-CCNC: 83 U/L
ALT SERPL-CCNC: 19 U/L
ANION GAP SERPL CALC-SCNC: 12 MMOL/L
AST SERPL-CCNC: 18 U/L
BASOPHILS # BLD AUTO: 0.04 K/UL
BASOPHILS NFR BLD AUTO: 0.7 %
BILIRUB SERPL-MCNC: 0.5 MG/DL
BUN SERPL-MCNC: 12 MG/DL
CALCIUM SERPL-MCNC: 9.5 MG/DL
CHLORIDE SERPL-SCNC: 107 MMOL/L
CHOLEST SERPL-MCNC: 123 MG/DL
CO2 SERPL-SCNC: 26 MMOL/L
CREAT SERPL-MCNC: 0.85 MG/DL
EGFRCR SERPLBLD CKD-EPI 2021: 80 ML/MIN/1.73M2
EOSINOPHIL # BLD AUTO: 0.19 K/UL
EOSINOPHIL NFR BLD AUTO: 3.4 %
ESTIMATED AVERAGE GLUCOSE: 131 MG/DL
GLUCOSE SERPL-MCNC: 166 MG/DL
HBA1C MFR BLD HPLC: 6.2 %
HCT VFR BLD CALC: 38 %
HDLC SERPL-MCNC: 38 MG/DL
HGB BLD-MCNC: 12.2 G/DL
IMM GRANULOCYTES NFR BLD AUTO: 0.2 %
LDLC SERPL-MCNC: 63 MG/DL
LYMPHOCYTES # BLD AUTO: 1.79 K/UL
LYMPHOCYTES NFR BLD AUTO: 31.9 %
MAN DIFF?: NORMAL
MCHC RBC-ENTMCNC: 29 PG
MCHC RBC-ENTMCNC: 32.1 G/DL
MCV RBC AUTO: 90.5 FL
MONOCYTES # BLD AUTO: 0.28 K/UL
MONOCYTES NFR BLD AUTO: 5 %
NEUTROPHILS # BLD AUTO: 3.3 K/UL
NEUTROPHILS NFR BLD AUTO: 58.8 %
NONHDLC SERPL-MCNC: 85 MG/DL
PLATELET # BLD AUTO: 288 K/UL
POTASSIUM SERPL-SCNC: 3.5 MMOL/L
PROT SERPL-MCNC: 6.8 G/DL
RBC # BLD: 4.2 M/UL
RBC # FLD: 14.8 %
SODIUM SERPL-SCNC: 145 MMOL/L
TRIGL SERPL-MCNC: 122 MG/DL
WBC # FLD AUTO: 5.61 K/UL

## 2025-07-14 ENCOUNTER — EMERGENCY (EMERGENCY)
Facility: HOSPITAL | Age: 55
LOS: 1 days | End: 2025-07-14
Attending: EMERGENCY MEDICINE
Payer: COMMERCIAL

## 2025-07-14 VITALS
RESPIRATION RATE: 16 BRPM | SYSTOLIC BLOOD PRESSURE: 150 MMHG | HEART RATE: 88 BPM | OXYGEN SATURATION: 98 % | TEMPERATURE: 98 F | DIASTOLIC BLOOD PRESSURE: 87 MMHG

## 2025-07-14 VITALS
OXYGEN SATURATION: 97 % | TEMPERATURE: 99 F | SYSTOLIC BLOOD PRESSURE: 116 MMHG | DIASTOLIC BLOOD PRESSURE: 67 MMHG | WEIGHT: 293 LBS | RESPIRATION RATE: 16 BRPM | HEART RATE: 105 BPM

## 2025-07-14 DIAGNOSIS — Z98.890 OTHER SPECIFIED POSTPROCEDURAL STATES: Chronic | ICD-10-CM

## 2025-07-14 LAB
ALBUMIN SERPL ELPH-MCNC: 3.8 G/DL — SIGNIFICANT CHANGE UP (ref 3.3–5.2)
ALP SERPL-CCNC: 80 U/L — SIGNIFICANT CHANGE UP (ref 40–120)
ALT FLD-CCNC: 14 U/L — SIGNIFICANT CHANGE UP
ANION GAP SERPL CALC-SCNC: 14 MMOL/L — SIGNIFICANT CHANGE UP (ref 5–17)
AST SERPL-CCNC: 20 U/L — SIGNIFICANT CHANGE UP
BASOPHILS # BLD AUTO: 0.02 K/UL — SIGNIFICANT CHANGE UP (ref 0–0.2)
BASOPHILS NFR BLD AUTO: 0.4 % — SIGNIFICANT CHANGE UP (ref 0–2)
BILIRUB SERPL-MCNC: 0.5 MG/DL — SIGNIFICANT CHANGE UP (ref 0.4–2)
BUN SERPL-MCNC: 12.5 MG/DL — SIGNIFICANT CHANGE UP (ref 8–20)
CALCIUM SERPL-MCNC: 9 MG/DL — SIGNIFICANT CHANGE UP (ref 8.4–10.5)
CHLORIDE SERPL-SCNC: 103 MMOL/L — SIGNIFICANT CHANGE UP (ref 96–108)
CO2 SERPL-SCNC: 23 MMOL/L — SIGNIFICANT CHANGE UP (ref 22–29)
CREAT SERPL-MCNC: 0.71 MG/DL — SIGNIFICANT CHANGE UP (ref 0.5–1.3)
EGFR: 100 ML/MIN/1.73M2 — SIGNIFICANT CHANGE UP
EGFR: 100 ML/MIN/1.73M2 — SIGNIFICANT CHANGE UP
EOSINOPHIL # BLD AUTO: 0.2 K/UL — SIGNIFICANT CHANGE UP (ref 0–0.5)
EOSINOPHIL NFR BLD AUTO: 3.6 % — SIGNIFICANT CHANGE UP (ref 0–6)
GLUCOSE SERPL-MCNC: 118 MG/DL — HIGH (ref 70–99)
HCT VFR BLD CALC: 38.5 % — SIGNIFICANT CHANGE UP (ref 34.5–45)
HGB BLD-MCNC: 12.4 G/DL — SIGNIFICANT CHANGE UP (ref 11.5–15.5)
IMM GRANULOCYTES # BLD AUTO: 0.01 K/UL — SIGNIFICANT CHANGE UP (ref 0–0.07)
IMM GRANULOCYTES NFR BLD AUTO: 0.2 % — SIGNIFICANT CHANGE UP (ref 0–0.9)
LYMPHOCYTES # BLD AUTO: 1.62 K/UL — SIGNIFICANT CHANGE UP (ref 1–3.3)
LYMPHOCYTES NFR BLD AUTO: 29.1 % — SIGNIFICANT CHANGE UP (ref 13–44)
MCHC RBC-ENTMCNC: 28.8 PG — SIGNIFICANT CHANGE UP (ref 27–34)
MCHC RBC-ENTMCNC: 32.2 G/DL — SIGNIFICANT CHANGE UP (ref 32–36)
MCV RBC AUTO: 89.5 FL — SIGNIFICANT CHANGE UP (ref 80–100)
MONOCYTES # BLD AUTO: 0.44 K/UL — SIGNIFICANT CHANGE UP (ref 0–0.9)
MONOCYTES NFR BLD AUTO: 7.9 % — SIGNIFICANT CHANGE UP (ref 2–14)
NEUTROPHILS # BLD AUTO: 3.28 K/UL — SIGNIFICANT CHANGE UP (ref 1.8–7.4)
NEUTROPHILS NFR BLD AUTO: 58.8 % — SIGNIFICANT CHANGE UP (ref 43–77)
NRBC # BLD AUTO: 0 K/UL — SIGNIFICANT CHANGE UP (ref 0–0)
NRBC # FLD: 0 K/UL — SIGNIFICANT CHANGE UP (ref 0–0)
NRBC BLD AUTO-RTO: 0 /100 WBCS — SIGNIFICANT CHANGE UP (ref 0–0)
NT-PROBNP SERPL-SCNC: 41 PG/ML — SIGNIFICANT CHANGE UP (ref 0–300)
PLATELET # BLD AUTO: 297 K/UL — SIGNIFICANT CHANGE UP (ref 150–400)
PMV BLD: 9.9 FL — SIGNIFICANT CHANGE UP (ref 7–13)
POTASSIUM SERPL-MCNC: 3.8 MMOL/L — SIGNIFICANT CHANGE UP (ref 3.5–5.3)
POTASSIUM SERPL-SCNC: 3.8 MMOL/L — SIGNIFICANT CHANGE UP (ref 3.5–5.3)
PROT SERPL-MCNC: 6.9 G/DL — SIGNIFICANT CHANGE UP (ref 6.6–8.7)
RBC # BLD: 4.3 M/UL — SIGNIFICANT CHANGE UP (ref 3.8–5.2)
RBC # FLD: 14.4 % — SIGNIFICANT CHANGE UP (ref 10.3–14.5)
SODIUM SERPL-SCNC: 140 MMOL/L — SIGNIFICANT CHANGE UP (ref 135–145)
TROPONIN T, HIGH SENSITIVITY RESULT: 11 NG/L — SIGNIFICANT CHANGE UP (ref 0–51)
TROPONIN T, HIGH SENSITIVITY RESULT: 12 NG/L — SIGNIFICANT CHANGE UP (ref 0–51)
WBC # BLD: 5.57 K/UL — SIGNIFICANT CHANGE UP (ref 3.8–10.5)
WBC # FLD AUTO: 5.57 K/UL — SIGNIFICANT CHANGE UP (ref 3.8–10.5)

## 2025-07-14 PROCEDURE — 71045 X-RAY EXAM CHEST 1 VIEW: CPT | Mod: 26

## 2025-07-14 PROCEDURE — 80053 COMPREHEN METABOLIC PANEL: CPT

## 2025-07-14 PROCEDURE — 36415 COLL VENOUS BLD VENIPUNCTURE: CPT

## 2025-07-14 PROCEDURE — 99285 EMERGENCY DEPT VISIT HI MDM: CPT | Mod: 25

## 2025-07-14 PROCEDURE — 84484 ASSAY OF TROPONIN QUANT: CPT

## 2025-07-14 PROCEDURE — 83880 ASSAY OF NATRIURETIC PEPTIDE: CPT

## 2025-07-14 PROCEDURE — 85025 COMPLETE CBC W/AUTO DIFF WBC: CPT

## 2025-07-14 PROCEDURE — 93005 ELECTROCARDIOGRAM TRACING: CPT

## 2025-07-14 PROCEDURE — 94640 AIRWAY INHALATION TREATMENT: CPT

## 2025-07-14 PROCEDURE — 93010 ELECTROCARDIOGRAM REPORT: CPT

## 2025-07-14 PROCEDURE — 99284 EMERGENCY DEPT VISIT MOD MDM: CPT

## 2025-07-14 PROCEDURE — 71045 X-RAY EXAM CHEST 1 VIEW: CPT

## 2025-07-14 RX ORDER — AZITHROMYCIN 250 MG
2 CAPSULE ORAL
Qty: 10 | Refills: 0
Start: 2025-07-14 | End: 2025-07-18

## 2025-07-14 RX ORDER — ALBUTEROL SULFATE 2.5 MG/3ML
2.5 VIAL, NEBULIZER (ML) INHALATION
Refills: 0 | Status: DISCONTINUED | OUTPATIENT
Start: 2025-07-14 | End: 2025-07-22

## 2025-07-14 RX ORDER — PREDNISONE 20 MG/1
1 TABLET ORAL
Qty: 10 | Refills: 0
Start: 2025-07-14 | End: 2025-07-18

## 2025-07-14 RX ORDER — PREDNISONE 20 MG/1
60 TABLET ORAL ONCE
Refills: 0 | Status: COMPLETED | OUTPATIENT
Start: 2025-07-14 | End: 2025-07-14

## 2025-07-14 RX ADMIN — PREDNISONE 60 MILLIGRAM(S): 20 TABLET ORAL at 16:26

## 2025-07-14 RX ADMIN — Medication 2.5 MILLIGRAM(S): at 16:28

## 2025-07-14 RX ADMIN — Medication 2.5 MILLIGRAM(S): at 16:26

## 2025-07-14 NOTE — ED PROVIDER NOTE - OBJECTIVE STATEMENT
59-year-old female past medical history of diabetes with chest pressure 1 day.  Patient notes symptoms feel similar to when she has exacerbation of her asthma.  Patient denies any fever chills nausea vomiting.  Patient states she used her emergency inhaler for her pressure.

## 2025-07-14 NOTE — ED PROVIDER NOTE - CLINICAL SUMMARY MEDICAL DECISION MAKING FREE TEXT BOX
history of asthma diabetes with chest pain will evaluate for ACS infection versus MSK treat with nebulizers prednisone x-ray labs and reassess for final disposition

## 2025-07-14 NOTE — ED PROVIDER NOTE - PATIENT PORTAL LINK FT
You can access the FollowMyHealth Patient Portal offered by [facility] by registering at the following website: [email or web address]. By joining [employer]’s FollowMyHealth portal, you will also be able to view your health information using other applications (apps) compatible with our system.

## 2025-07-14 NOTE — ED PROVIDER NOTE - NSFOLLOWUPINSTRUCTIONS_ED_ALL_ED_FT
prednisone 20mg by mouth 2 times a day for  5 days  albuterol mdi 2puffs every 6 hours  zithromax 500mg by mouth once a day for 5 days  follow up with doctor in 5 - 7 days

## 2025-07-14 NOTE — ED ADULT NURSE NOTE - OBJECTIVE STATEMENT
Pt is Axox3 c/o chest pressure, cm and po in place, hx of HTN compliant with meds. breathing even and unlabored. Pt is aware of plan of care.

## 2025-08-25 ENCOUNTER — APPOINTMENT (OUTPATIENT)
Dept: GASTROENTEROLOGY | Facility: CLINIC | Age: 55
End: 2025-08-25

## 2025-08-29 ENCOUNTER — NON-APPOINTMENT (OUTPATIENT)
Age: 55
End: 2025-08-29

## (undated) DEVICE — DRAPE MAJOR ABDOMINAL W POUCHES

## (undated) DEVICE — DRAPE LAVH 124" X 30" X125"

## (undated) DEVICE — DRAPE MAGNETIC INSTRUMENT MEDIUM

## (undated) DEVICE — SUT PDS II 1 48" TP-1

## (undated) DEVICE — SUT VICRYL 2-0 27" SH UNDYED

## (undated) DEVICE — DRSG PREVENA PEEL & PLACE KIT 20CM

## (undated) DEVICE — DRSG TELFA 3 X 4

## (undated) DEVICE — GLV 8 PROTEXIS (WHITE)

## (undated) DEVICE — SUT MONOCRYL 4-0 27" PS-2 UNDYED

## (undated) DEVICE — DRAIN JACKSON PRATT 10MM FLAT FULL 15FR TROCAR

## (undated) DEVICE — DRSG DERMABOND 0.7ML

## (undated) DEVICE — DRAPE TOWEL BLUE 17" X 24"

## (undated) DEVICE — LIGASURE IMPACT

## (undated) DEVICE — WARMING BLANKET UPPER ADULT

## (undated) DEVICE — DRAPE 3/4 SHEET 52X76"

## (undated) DEVICE — DRAIN RESERVOIR FOR JACKSON PRATT 100CC CARDINAL

## (undated) DEVICE — STAPLER SKIN PROXIMATE

## (undated) DEVICE — SUT VICRYL 0 18" CT-1 UNDYED (POP-OFF)

## (undated) DEVICE — GLV 7.5 PROTEXIS (WHITE)

## (undated) DEVICE — ELCTR BOVIE TIP BLADE INSULATED 6.5" EDGE

## (undated) DEVICE — FOLEY TRAY 14FR 5CC LF UMETER CLOSED

## (undated) DEVICE — DRAPE IOBAN 23" X 17"

## (undated) DEVICE — PREP SCRUB BRUSH W CHG 4%

## (undated) DEVICE — VENODYNE/SCD SLEEVE CALF MEDIUM

## (undated) DEVICE — ELCTR GROUNDING PAD ADULT COVIDIEN

## (undated) DEVICE — SUCTION YANKAUER TAPERED BULBOUS NO VENT

## (undated) DEVICE — PREP CHLORAPREP HI-LITE ORANGE 26ML

## (undated) DEVICE — PACK MAJOR ABDOMINAL WITH LAP